# Patient Record
Sex: FEMALE | Race: WHITE | Employment: FULL TIME | URBAN - METROPOLITAN AREA
[De-identification: names, ages, dates, MRNs, and addresses within clinical notes are randomized per-mention and may not be internally consistent; named-entity substitution may affect disease eponyms.]

---

## 2017-04-24 ENCOUNTER — APPOINTMENT (OUTPATIENT)
Dept: GENERAL RADIOLOGY | Age: 37
End: 2017-04-24
Payer: COMMERCIAL

## 2017-04-24 ENCOUNTER — HOSPITAL ENCOUNTER (EMERGENCY)
Age: 37
Discharge: HOME | End: 2017-04-24
Attending: EMERGENCY MEDICINE
Payer: COMMERCIAL

## 2017-04-24 VITALS
OXYGEN SATURATION: 99 % | BODY MASS INDEX: 43.32 KG/M2 | RESPIRATION RATE: 18 BRPM | WEIGHT: 260 LBS | HEIGHT: 65 IN | TEMPERATURE: 98.1 F | SYSTOLIC BLOOD PRESSURE: 115 MMHG | HEART RATE: 82 BPM | DIASTOLIC BLOOD PRESSURE: 70 MMHG

## 2017-04-24 DIAGNOSIS — S93.402A SPRAIN OF LEFT ANKLE, UNSPECIFIED LIGAMENT, INITIAL ENCOUNTER: Primary | ICD-10-CM

## 2017-04-24 PROCEDURE — 99283 EMERGENCY DEPT VISIT LOW MDM: CPT

## 2017-04-24 PROCEDURE — 73610 X-RAY EXAM OF ANKLE: CPT

## 2017-04-24 RX ORDER — IBUPROFEN 800 MG/1
800 TABLET ORAL EVERY 8 HOURS PRN
Qty: 30 TABLET | Refills: 0 | Status: SHIPPED | OUTPATIENT
Start: 2017-04-24 | End: 2018-08-06 | Stop reason: ALTCHOICE

## 2017-04-24 ASSESSMENT — PAIN SCALES - GENERAL: PAINLEVEL_OUTOF10: 8

## 2018-04-16 ENCOUNTER — HOSPITAL ENCOUNTER (EMERGENCY)
Age: 38
Discharge: HOME | End: 2018-04-16
Attending: EMERGENCY MEDICINE

## 2018-04-16 VITALS
RESPIRATION RATE: 16 BRPM | OXYGEN SATURATION: 98 % | HEIGHT: 65 IN | BODY MASS INDEX: 41.65 KG/M2 | HEART RATE: 79 BPM | DIASTOLIC BLOOD PRESSURE: 76 MMHG | SYSTOLIC BLOOD PRESSURE: 124 MMHG | WEIGHT: 250 LBS | TEMPERATURE: 98.2 F

## 2018-04-16 DIAGNOSIS — B37.2 CANDIDAL INTERTRIGO: Primary | ICD-10-CM

## 2018-04-16 LAB — GLUCOSE BLD-MCNC: 100 MG/DL (ref 65–105)

## 2018-04-16 PROCEDURE — 99283 EMERGENCY DEPT VISIT LOW MDM: CPT

## 2018-04-16 PROCEDURE — 82947 ASSAY GLUCOSE BLOOD QUANT: CPT

## 2018-04-16 PROCEDURE — 6360000002 HC RX W HCPCS: Performed by: EMERGENCY MEDICINE

## 2018-04-16 PROCEDURE — 2500000003 HC RX 250 WO HCPCS: Performed by: EMERGENCY MEDICINE

## 2018-04-16 RX ORDER — DEXAMETHASONE SODIUM PHOSPHATE 4 MG/ML
10 INJECTION, SOLUTION INTRA-ARTICULAR; INTRALESIONAL; INTRAMUSCULAR; INTRAVENOUS; SOFT TISSUE ONCE
Status: COMPLETED | OUTPATIENT
Start: 2018-04-16 | End: 2018-04-16

## 2018-04-16 RX ORDER — NYSTATIN 100000 [USP'U]/G
POWDER TOPICAL
Qty: 1 BOTTLE | Refills: 0 | Status: SHIPPED | OUTPATIENT
Start: 2018-04-16 | End: 2019-04-23

## 2018-04-16 RX ADMIN — DEXAMETHASONE SODIUM PHOSPHATE 10 MG: 4 INJECTION, SOLUTION INTRAMUSCULAR; INTRAVENOUS at 01:29

## 2018-04-16 RX ADMIN — MICONAZOLE NITRATE: 2 POWDER TOPICAL at 01:55

## 2018-05-12 ENCOUNTER — HOSPITAL ENCOUNTER (EMERGENCY)
Age: 38
Discharge: HOME | End: 2018-05-12
Attending: EMERGENCY MEDICINE

## 2018-05-12 VITALS
TEMPERATURE: 98.3 F | DIASTOLIC BLOOD PRESSURE: 75 MMHG | HEART RATE: 60 BPM | HEIGHT: 65 IN | WEIGHT: 250 LBS | RESPIRATION RATE: 16 BRPM | SYSTOLIC BLOOD PRESSURE: 118 MMHG | BODY MASS INDEX: 41.65 KG/M2 | OXYGEN SATURATION: 97 %

## 2018-05-12 DIAGNOSIS — R11.2 NAUSEA VOMITING AND DIARRHEA: Primary | ICD-10-CM

## 2018-05-12 DIAGNOSIS — R19.7 NAUSEA VOMITING AND DIARRHEA: Primary | ICD-10-CM

## 2018-05-12 LAB
ABSOLUTE EOS #: 0.2 K/UL (ref 0–0.4)
ABSOLUTE IMMATURE GRANULOCYTE: ABNORMAL K/UL (ref 0–0.3)
ABSOLUTE LYMPH #: 1 K/UL (ref 1–4.8)
ABSOLUTE MONO #: 0.5 K/UL (ref 0.1–1.3)
ALBUMIN SERPL-MCNC: 3.9 G/DL (ref 3.5–5.2)
ALBUMIN/GLOBULIN RATIO: ABNORMAL (ref 1–2.5)
ALP BLD-CCNC: 111 U/L (ref 35–104)
ALT SERPL-CCNC: 25 U/L (ref 5–33)
ANION GAP SERPL CALCULATED.3IONS-SCNC: 14 MMOL/L (ref 9–17)
AST SERPL-CCNC: 30 U/L
BASOPHILS # BLD: 1 % (ref 0–2)
BASOPHILS ABSOLUTE: 0 K/UL (ref 0–0.2)
BILIRUB SERPL-MCNC: 0.41 MG/DL (ref 0.3–1.2)
BUN BLDV-MCNC: 8 MG/DL (ref 6–20)
BUN/CREAT BLD: ABNORMAL (ref 9–20)
CALCIUM SERPL-MCNC: 8.6 MG/DL (ref 8.6–10.4)
CHLORIDE BLD-SCNC: 101 MMOL/L (ref 98–107)
CO2: 22 MMOL/L (ref 20–31)
CREAT SERPL-MCNC: 0.75 MG/DL (ref 0.5–0.9)
DIFFERENTIAL TYPE: ABNORMAL
EOSINOPHILS RELATIVE PERCENT: 3 % (ref 0–4)
GFR AFRICAN AMERICAN: >60 ML/MIN
GFR NON-AFRICAN AMERICAN: >60 ML/MIN
GFR SERPL CREATININE-BSD FRML MDRD: ABNORMAL ML/MIN/{1.73_M2}
GFR SERPL CREATININE-BSD FRML MDRD: ABNORMAL ML/MIN/{1.73_M2}
GLUCOSE BLD-MCNC: 87 MG/DL (ref 70–99)
HCT VFR BLD CALC: 43.8 % (ref 36–46)
HEMOGLOBIN: 15.1 G/DL (ref 12–16)
IMMATURE GRANULOCYTES: ABNORMAL %
LIPASE: 15 U/L (ref 13–60)
LYMPHOCYTES # BLD: 16 % (ref 24–44)
MCH RBC QN AUTO: 29.6 PG (ref 26–34)
MCHC RBC AUTO-ENTMCNC: 34.5 G/DL (ref 31–37)
MCV RBC AUTO: 85.8 FL (ref 80–100)
MONOCYTES # BLD: 9 % (ref 1–7)
NRBC AUTOMATED: ABNORMAL PER 100 WBC
PDW BLD-RTO: 13.5 % (ref 11.5–14.9)
PLATELET # BLD: 269 K/UL (ref 150–450)
PLATELET ESTIMATE: ABNORMAL
PMV BLD AUTO: 8 FL (ref 6–12)
POTASSIUM SERPL-SCNC: 4.6 MMOL/L (ref 3.7–5.3)
RBC # BLD: 5.1 M/UL (ref 4–5.2)
RBC # BLD: ABNORMAL 10*6/UL
SEG NEUTROPHILS: 71 % (ref 36–66)
SEGMENTED NEUTROPHILS ABSOLUTE COUNT: 4.5 K/UL (ref 1.3–9.1)
SODIUM BLD-SCNC: 137 MMOL/L (ref 135–144)
TOTAL PROTEIN: 7.4 G/DL (ref 6.4–8.3)
WBC # BLD: 6.3 K/UL (ref 3.5–11)
WBC # BLD: ABNORMAL 10*3/UL

## 2018-05-12 PROCEDURE — 80053 COMPREHEN METABOLIC PANEL: CPT

## 2018-05-12 PROCEDURE — 96374 THER/PROPH/DIAG INJ IV PUSH: CPT

## 2018-05-12 PROCEDURE — 6360000002 HC RX W HCPCS: Performed by: EMERGENCY MEDICINE

## 2018-05-12 PROCEDURE — 2580000003 HC RX 258: Performed by: EMERGENCY MEDICINE

## 2018-05-12 PROCEDURE — 99284 EMERGENCY DEPT VISIT MOD MDM: CPT

## 2018-05-12 PROCEDURE — 85025 COMPLETE CBC W/AUTO DIFF WBC: CPT

## 2018-05-12 PROCEDURE — 83690 ASSAY OF LIPASE: CPT

## 2018-05-12 PROCEDURE — 96375 TX/PRO/DX INJ NEW DRUG ADDON: CPT

## 2018-05-12 PROCEDURE — 36415 COLL VENOUS BLD VENIPUNCTURE: CPT

## 2018-05-12 RX ORDER — 0.9 % SODIUM CHLORIDE 0.9 %
1000 INTRAVENOUS SOLUTION INTRAVENOUS ONCE
Status: COMPLETED | OUTPATIENT
Start: 2018-05-12 | End: 2018-05-12

## 2018-05-12 RX ORDER — ONDANSETRON 4 MG/1
4 TABLET, ORALLY DISINTEGRATING ORAL ONCE
Status: COMPLETED | OUTPATIENT
Start: 2018-05-12 | End: 2018-05-12

## 2018-05-12 RX ORDER — KETOROLAC TROMETHAMINE 30 MG/ML
30 INJECTION, SOLUTION INTRAMUSCULAR; INTRAVENOUS ONCE
Status: COMPLETED | OUTPATIENT
Start: 2018-05-12 | End: 2018-05-12

## 2018-05-12 RX ORDER — ONDANSETRON 2 MG/ML
4 INJECTION INTRAMUSCULAR; INTRAVENOUS ONCE
Status: COMPLETED | OUTPATIENT
Start: 2018-05-12 | End: 2018-05-12

## 2018-05-12 RX ORDER — ONDANSETRON 4 MG/1
4 TABLET, ORALLY DISINTEGRATING ORAL EVERY 8 HOURS PRN
Qty: 10 TABLET | Refills: 0 | Status: SHIPPED | OUTPATIENT
Start: 2018-05-12 | End: 2019-04-23

## 2018-05-12 RX ADMIN — KETOROLAC TROMETHAMINE 30 MG: 30 INJECTION, SOLUTION INTRAMUSCULAR; INTRAVENOUS at 17:56

## 2018-05-12 RX ADMIN — ONDANSETRON 4 MG: 4 TABLET, ORALLY DISINTEGRATING ORAL at 17:39

## 2018-05-12 RX ADMIN — SODIUM CHLORIDE 1000 ML: 9 INJECTION, SOLUTION INTRAVENOUS at 17:49

## 2018-05-12 RX ADMIN — ONDANSETRON 4 MG: 2 INJECTION INTRAMUSCULAR; INTRAVENOUS at 17:56

## 2018-05-12 ASSESSMENT — PAIN DESCRIPTION - ORIENTATION: ORIENTATION: MID;UPPER

## 2018-05-12 ASSESSMENT — PAIN SCALES - GENERAL
PAINLEVEL_OUTOF10: 8
PAINLEVEL_OUTOF10: 7
PAINLEVEL_OUTOF10: 8

## 2018-05-12 ASSESSMENT — ENCOUNTER SYMPTOMS
BACK PAIN: 0
DIARRHEA: 1
SHORTNESS OF BREATH: 0
VOMITING: 1
ABDOMINAL PAIN: 1
RESPIRATORY NEGATIVE: 1
EYES NEGATIVE: 1
NAUSEA: 1
COUGH: 0

## 2018-05-12 ASSESSMENT — PAIN DESCRIPTION - PAIN TYPE: TYPE: ACUTE PAIN

## 2018-05-12 ASSESSMENT — PAIN DESCRIPTION - LOCATION: LOCATION: ABDOMEN

## 2018-08-06 ENCOUNTER — HOSPITAL ENCOUNTER (EMERGENCY)
Age: 38
Discharge: HOME | End: 2018-08-06
Attending: EMERGENCY MEDICINE

## 2018-08-06 VITALS
HEART RATE: 84 BPM | RESPIRATION RATE: 17 BRPM | TEMPERATURE: 98.4 F | DIASTOLIC BLOOD PRESSURE: 72 MMHG | OXYGEN SATURATION: 100 % | WEIGHT: 250 LBS | HEIGHT: 65 IN | SYSTOLIC BLOOD PRESSURE: 114 MMHG | BODY MASS INDEX: 41.65 KG/M2

## 2018-08-06 DIAGNOSIS — M25.561 ACUTE PAIN OF RIGHT KNEE: Primary | ICD-10-CM

## 2018-08-06 PROCEDURE — 6370000000 HC RX 637 (ALT 250 FOR IP): Performed by: PHYSICIAN ASSISTANT

## 2018-08-06 PROCEDURE — 99284 EMERGENCY DEPT VISIT MOD MDM: CPT

## 2018-08-06 PROCEDURE — 93971 EXTREMITY STUDY: CPT

## 2018-08-06 RX ORDER — IBUPROFEN 800 MG/1
800 TABLET ORAL ONCE
Status: COMPLETED | OUTPATIENT
Start: 2018-08-06 | End: 2018-08-06

## 2018-08-06 RX ORDER — IBUPROFEN 800 MG/1
800 TABLET ORAL EVERY 8 HOURS PRN
Qty: 20 TABLET | Refills: 0 | Status: SHIPPED | OUTPATIENT
Start: 2018-08-06 | End: 2019-04-23

## 2018-08-06 RX ADMIN — IBUPROFEN 800 MG: 800 TABLET ORAL at 22:53

## 2018-08-06 ASSESSMENT — PAIN SCALES - GENERAL: PAINLEVEL_OUTOF10: 8

## 2018-08-07 NOTE — ED PROVIDER NOTES
[address]  Emergency Department  Independent Attestation     Pt Name: Santi Rehman  MRN: [id number]  [place] 1980  Date of evaluation: 8/6/18       Santi Rehman is a 40 y.o. female who presents with Knee Pain      I was personally available for consultation in the Emergency Department.     Camden Wade DO  Attending Emergency Physician  [address]      (Please note that portions of this note were completed with a voice recognition program.  Efforts were made to edit the dictations but occasionally words are mis-transcribed.)        Camden Wade DO  08/06/18 2030
DISINTEGRATING TABLET    Take 1 tablet by mouth every 8 hours as needed for Nausea or Vomiting    PROPRANOLOL (INDERAL LA) 60 MG CR CAPSULE    Take 60 mg by mouth daily    TOPIRAMATE (TOPAMAX) 25 MG TABLET      Take 50 mg by mouth daily        ALLERGIES     Patient has no known allergies. FAMILY HISTORY     History reviewed. No pertinent family history. No family status information on file. None otherwise stated in nurses note    SOCIAL HISTORY      reports that she has never smoked. She has never used smokeless tobacco. She reports that she does not drink alcohol or use drugs. Lives at home with others    PHYSICAL EXAM    (up to 7 for level 4, 8 or more for level 5)     ED Triage Vitals [08/06/18 2040]   BP Temp Temp Source Pulse Resp SpO2 Height Weight   114/72 98.4 °F (36.9 °C) Oral 84 17 100 % 5' 5\" (1.651 m) 250 lb (113.4 kg)       Physical Exam   Nursing note and vitals reviewed. Constitutional: Oriented to person, place, and time and well-developed, well-nourished, and in no distress. Head: Normocephalic and atraumatic. Ear: External ears normal.   Nose: Nose normal and midline. Eyes: Conjunctivae and EOM are normal. Pupils are equal, round, and reactive to light. Cardiovascular: Normal rate, regular rhythm, normal heart sounds and intact distal pulses. Pulmonary/Chest: Effort normal and breath sounds normal. No respiratory distress. No wheezes. No rales. No chest tenderness. Musculoskeletal: TTP right knee, Normal range of motion, no swelling or warmth or rashes. There is some mild tenderness to palpation to the lateral right knee and the right fibula area. Negative Homans sign. Neurological: Alert and oriented to person, place, and time. GCS score is 15. Skin: Skin is warm and dry. No rash noted. No erythema. No pallor.              DIAGNOSTIC RESULTS     RADIOLOGY:   All plain film, CT, MRI, and formal ultrasound images (except ED bedside ultrasound) are read by the radiologist

## 2018-12-27 ENCOUNTER — HOSPITAL ENCOUNTER (EMERGENCY)
Age: 38
Discharge: HOME | End: 2018-12-28
Attending: EMERGENCY MEDICINE
Payer: COMMERCIAL

## 2018-12-27 ENCOUNTER — APPOINTMENT (OUTPATIENT)
Dept: GENERAL RADIOLOGY | Age: 38
End: 2018-12-27
Payer: COMMERCIAL

## 2018-12-27 VITALS
HEART RATE: 67 BPM | HEIGHT: 65 IN | TEMPERATURE: 97.5 F | OXYGEN SATURATION: 95 % | RESPIRATION RATE: 16 BRPM | WEIGHT: 250 LBS | DIASTOLIC BLOOD PRESSURE: 68 MMHG | SYSTOLIC BLOOD PRESSURE: 112 MMHG | BODY MASS INDEX: 41.65 KG/M2

## 2018-12-27 DIAGNOSIS — J40 BRONCHITIS: Primary | ICD-10-CM

## 2018-12-27 LAB
ABSOLUTE EOS #: 0 K/UL (ref 0–0.4)
ABSOLUTE IMMATURE GRANULOCYTE: ABNORMAL K/UL (ref 0–0.3)
ABSOLUTE LYMPH #: 0.7 K/UL (ref 1–4.8)
ABSOLUTE MONO #: 0.1 K/UL (ref 0.1–1.3)
ALBUMIN SERPL-MCNC: 4 G/DL (ref 3.5–5.2)
ALBUMIN/GLOBULIN RATIO: ABNORMAL (ref 1–2.5)
ALP BLD-CCNC: 100 U/L (ref 35–104)
ALT SERPL-CCNC: 21 U/L (ref 5–33)
ANION GAP SERPL CALCULATED.3IONS-SCNC: 14 MMOL/L (ref 9–17)
AST SERPL-CCNC: 19 U/L
BASOPHILS # BLD: 0 % (ref 0–2)
BASOPHILS ABSOLUTE: 0 K/UL (ref 0–0.2)
BILIRUB SERPL-MCNC: 0.2 MG/DL (ref 0.3–1.2)
BNP INTERPRETATION: NORMAL
BUN BLDV-MCNC: 9 MG/DL (ref 6–20)
BUN/CREAT BLD: ABNORMAL (ref 9–20)
CALCIUM SERPL-MCNC: 9.4 MG/DL (ref 8.6–10.4)
CHLORIDE BLD-SCNC: 102 MMOL/L (ref 98–107)
CO2: 22 MMOL/L (ref 20–31)
CREAT SERPL-MCNC: 0.76 MG/DL (ref 0.5–0.9)
D-DIMER QUANTITATIVE: 0.37 MG/L FEU
DIFFERENTIAL TYPE: ABNORMAL
DIRECT EXAM: NORMAL
EKG ATRIAL RATE: 79 BPM
EKG P AXIS: 30 DEGREES
EKG P-R INTERVAL: 166 MS
EKG Q-T INTERVAL: 386 MS
EKG QRS DURATION: 92 MS
EKG QTC CALCULATION (BAZETT): 442 MS
EKG R AXIS: 36 DEGREES
EKG T AXIS: 35 DEGREES
EKG VENTRICULAR RATE: 79 BPM
EOSINOPHILS RELATIVE PERCENT: 0 % (ref 0–4)
GFR AFRICAN AMERICAN: >60 ML/MIN
GFR NON-AFRICAN AMERICAN: >60 ML/MIN
GFR SERPL CREATININE-BSD FRML MDRD: ABNORMAL ML/MIN/{1.73_M2}
GFR SERPL CREATININE-BSD FRML MDRD: ABNORMAL ML/MIN/{1.73_M2}
GLUCOSE BLD-MCNC: 204 MG/DL (ref 70–99)
HCT VFR BLD CALC: 43.1 % (ref 36–46)
HEMOGLOBIN: 14.3 G/DL (ref 12–16)
IMMATURE GRANULOCYTES: ABNORMAL %
LYMPHOCYTES # BLD: 7 % (ref 24–44)
Lab: NORMAL
MCH RBC QN AUTO: 28 PG (ref 26–34)
MCHC RBC AUTO-ENTMCNC: 33.1 G/DL (ref 31–37)
MCV RBC AUTO: 84.6 FL (ref 80–100)
MONOCYTES # BLD: 1 % (ref 1–7)
NRBC AUTOMATED: ABNORMAL PER 100 WBC
PDW BLD-RTO: 13.4 % (ref 11.5–14.9)
PLATELET # BLD: 309 K/UL (ref 150–450)
PLATELET ESTIMATE: ABNORMAL
PMV BLD AUTO: 8.1 FL (ref 6–12)
POTASSIUM SERPL-SCNC: 4.1 MMOL/L (ref 3.7–5.3)
PRO-BNP: 177 PG/ML
RBC # BLD: 5.1 M/UL (ref 4–5.2)
RBC # BLD: ABNORMAL 10*6/UL
SEG NEUTROPHILS: 92 % (ref 36–66)
SEGMENTED NEUTROPHILS ABSOLUTE COUNT: 9.7 K/UL (ref 1.3–9.1)
SODIUM BLD-SCNC: 138 MMOL/L (ref 135–144)
SPECIMEN DESCRIPTION: NORMAL
STATUS: NORMAL
TOTAL PROTEIN: 7.6 G/DL (ref 6.4–8.3)
TROPONIN INTERP: NORMAL
TROPONIN T: NORMAL NG/ML
TROPONIN, HIGH SENSITIVITY: <6 NG/L (ref 0–14)
WBC # BLD: 10.6 K/UL (ref 3.5–11)
WBC # BLD: ABNORMAL 10*3/UL

## 2018-12-27 PROCEDURE — 87804 INFLUENZA ASSAY W/OPTIC: CPT

## 2018-12-27 PROCEDURE — 93005 ELECTROCARDIOGRAM TRACING: CPT

## 2018-12-27 PROCEDURE — 85379 FIBRIN DEGRADATION QUANT: CPT

## 2018-12-27 PROCEDURE — 2580000003 HC RX 258: Performed by: EMERGENCY MEDICINE

## 2018-12-27 PROCEDURE — 71046 X-RAY EXAM CHEST 2 VIEWS: CPT

## 2018-12-27 PROCEDURE — 99284 EMERGENCY DEPT VISIT MOD MDM: CPT

## 2018-12-27 PROCEDURE — 83880 ASSAY OF NATRIURETIC PEPTIDE: CPT

## 2018-12-27 PROCEDURE — 84484 ASSAY OF TROPONIN QUANT: CPT

## 2018-12-27 PROCEDURE — 36415 COLL VENOUS BLD VENIPUNCTURE: CPT

## 2018-12-27 PROCEDURE — 85025 COMPLETE CBC W/AUTO DIFF WBC: CPT

## 2018-12-27 PROCEDURE — 80053 COMPREHEN METABOLIC PANEL: CPT

## 2018-12-27 RX ORDER — BENZONATATE 100 MG/1
100 CAPSULE ORAL 3 TIMES DAILY PRN
Qty: 30 CAPSULE | Refills: 0 | Status: SHIPPED | OUTPATIENT
Start: 2018-12-27 | End: 2019-01-03

## 2018-12-27 RX ORDER — ALBUTEROL SULFATE 1.25 MG/3ML
1 SOLUTION RESPIRATORY (INHALATION) EVERY 6 HOURS PRN
COMMUNITY
End: 2019-12-26

## 2018-12-27 RX ORDER — PREDNISONE 20 MG/1
20 TABLET ORAL 2 TIMES DAILY
COMMUNITY
End: 2019-04-23

## 2018-12-27 RX ORDER — CODEINE PHOSPHATE AND GUAIFENESIN 10; 100 MG/5ML; MG/5ML
10 SOLUTION ORAL ONCE
Status: COMPLETED | OUTPATIENT
Start: 2018-12-28 | End: 2018-12-28

## 2018-12-27 RX ORDER — 0.9 % SODIUM CHLORIDE 0.9 %
1000 INTRAVENOUS SOLUTION INTRAVENOUS ONCE
Status: COMPLETED | OUTPATIENT
Start: 2018-12-27 | End: 2018-12-27

## 2018-12-27 RX ADMIN — SODIUM CHLORIDE 1000 ML: 9 INJECTION, SOLUTION INTRAVENOUS at 21:48

## 2018-12-27 ASSESSMENT — ENCOUNTER SYMPTOMS
DIARRHEA: 0
EYE REDNESS: 0
ABDOMINAL PAIN: 0
NAUSEA: 0
EYE PAIN: 0
VOMITING: 0
SHORTNESS OF BREATH: 1
BACK PAIN: 0
SORE THROAT: 0
RHINORRHEA: 0
COUGH: 1

## 2018-12-27 ASSESSMENT — PAIN DESCRIPTION - LOCATION: LOCATION: CHEST

## 2018-12-27 ASSESSMENT — PAIN SCALES - GENERAL: PAINLEVEL_OUTOF10: 7

## 2018-12-27 ASSESSMENT — PAIN DESCRIPTION - FREQUENCY: FREQUENCY: CONTINUOUS

## 2018-12-27 ASSESSMENT — PAIN DESCRIPTION - DESCRIPTORS: DESCRIPTORS: ACHING

## 2018-12-28 PROCEDURE — 6370000000 HC RX 637 (ALT 250 FOR IP): Performed by: EMERGENCY MEDICINE

## 2018-12-28 RX ADMIN — GUAIFENESIN AND CODEINE PHOSPHATE 10 ML: 100; 10 SOLUTION ORAL at 00:03

## 2018-12-28 NOTE — ED PROVIDER NOTES
Smoker    Smokeless tobacco: Never Used    Alcohol use No     PHYSICAL EXAM     INITIAL VITALS: /68   Pulse 67   Temp 97.5 °F (36.4 °C) (Oral)   Resp 16   Ht 5' 5\" (1.651 m)   Wt 250 lb (113.4 kg)   SpO2 95%   BMI 41.60 kg/m²    Physical Exam   Constitutional: She is oriented to person, place, and time. She appears well-developed and well-nourished. No distress. HENT:   Head: Normocephalic and atraumatic. Nose: Nose normal.   Mouth/Throat: Oropharynx is clear and moist.   Eyes: Pupils are equal, round, and reactive to light. Conjunctivae and EOM are normal.   Cardiovascular: Normal rate, regular rhythm and normal heart sounds. Exam reveals no gallop and no friction rub. No murmur heard. Pulmonary/Chest: Effort normal and breath sounds normal. No respiratory distress. She has no wheezes. She has no rales. She exhibits no tenderness. Abdominal: Soft. Bowel sounds are normal. She exhibits no distension. There is no tenderness. Musculoskeletal: Normal range of motion. She exhibits no edema or tenderness. Neurological: She is alert and oriented to person, place, and time. She exhibits normal muscle tone. Coordination normal.   Skin: Skin is warm and dry. No rash noted. She is not diaphoretic. Nursing note and vitals reviewed. MEDICAL DECISION MAKIN y.o. female presenting with exertional shortness of breath and cough. DDx includes, but is not limited to: PE, CHF, influenza, pneumonia, bronchitis. Will get cardiac workup, d-dimer, flu swab. Will hydrate and re-evaluate. Labs show no CHF, d-dimer negative, trop/bnp negative. No pneumonia on CXR. Flu negative. Will treat for bronchitis, follow up with PCP. Return precautions given verbally and in discharge paperwork, all questions answered. Patient agrees with plan of care.       CRITICAL CARE:       PROCEDURES:    Procedures  EKG Interpretation    Interpreted by me    Rhythm: normal sinus   Rate: 79  Axis: normal  Ectopy:

## 2018-12-31 ENCOUNTER — HOSPITAL ENCOUNTER (EMERGENCY)
Age: 38
Discharge: HOME | End: 2018-12-31
Attending: EMERGENCY MEDICINE
Payer: COMMERCIAL

## 2018-12-31 VITALS
SYSTOLIC BLOOD PRESSURE: 111 MMHG | HEIGHT: 65 IN | WEIGHT: 260 LBS | DIASTOLIC BLOOD PRESSURE: 75 MMHG | OXYGEN SATURATION: 97 % | TEMPERATURE: 97.9 F | RESPIRATION RATE: 18 BRPM | HEART RATE: 70 BPM | BODY MASS INDEX: 43.32 KG/M2

## 2018-12-31 DIAGNOSIS — J06.9 VIRAL UPPER RESPIRATORY INFECTION: Primary | ICD-10-CM

## 2018-12-31 DIAGNOSIS — R42 DIZZINESS: ICD-10-CM

## 2018-12-31 PROCEDURE — 99284 EMERGENCY DEPT VISIT MOD MDM: CPT

## 2018-12-31 PROCEDURE — 6360000002 HC RX W HCPCS: Performed by: STUDENT IN AN ORGANIZED HEALTH CARE EDUCATION/TRAINING PROGRAM

## 2018-12-31 PROCEDURE — 6370000000 HC RX 637 (ALT 250 FOR IP): Performed by: STUDENT IN AN ORGANIZED HEALTH CARE EDUCATION/TRAINING PROGRAM

## 2018-12-31 PROCEDURE — 96372 THER/PROPH/DIAG INJ SC/IM: CPT

## 2018-12-31 RX ORDER — MECLIZINE HYDROCHLORIDE 25 MG/1
25 TABLET ORAL 3 TIMES DAILY PRN
Qty: 9 TABLET | Refills: 0 | Status: SHIPPED | OUTPATIENT
Start: 2018-12-31 | End: 2019-01-10

## 2018-12-31 RX ORDER — MECLIZINE HYDROCHLORIDE 25 MG/1
25 TABLET ORAL ONCE
Status: COMPLETED | OUTPATIENT
Start: 2018-12-31 | End: 2018-12-31

## 2018-12-31 RX ORDER — KETOROLAC TROMETHAMINE 30 MG/ML
15 INJECTION, SOLUTION INTRAMUSCULAR; INTRAVENOUS ONCE
Status: COMPLETED | OUTPATIENT
Start: 2018-12-31 | End: 2018-12-31

## 2018-12-31 RX ADMIN — KETOROLAC TROMETHAMINE 15 MG: 30 INJECTION, SOLUTION INTRAMUSCULAR at 23:01

## 2018-12-31 RX ADMIN — MECLIZINE HYDROCHLORIDE 25 MG: 25 TABLET ORAL at 22:25

## 2018-12-31 RX ADMIN — PROCHLORPERAZINE EDISYLATE 10 MG: 5 INJECTION INTRAMUSCULAR; INTRAVENOUS at 23:02

## 2018-12-31 ASSESSMENT — PAIN DESCRIPTION - LOCATION
LOCATION: HEAD
LOCATION: HEAD

## 2018-12-31 ASSESSMENT — PAIN DESCRIPTION - DESCRIPTORS: DESCRIPTORS: ACHING

## 2018-12-31 ASSESSMENT — PAIN SCALES - GENERAL
PAINLEVEL_OUTOF10: 4
PAINLEVEL_OUTOF10: 8
PAINLEVEL_OUTOF10: 8

## 2018-12-31 ASSESSMENT — PAIN DESCRIPTION - PROGRESSION: CLINICAL_PROGRESSION: GRADUALLY IMPROVING

## 2019-01-21 ENCOUNTER — APPOINTMENT (OUTPATIENT)
Dept: GENERAL RADIOLOGY | Age: 39
End: 2019-01-21
Payer: COMMERCIAL

## 2019-01-21 ENCOUNTER — HOSPITAL ENCOUNTER (EMERGENCY)
Age: 39
Discharge: HOME | End: 2019-01-21
Attending: EMERGENCY MEDICINE
Payer: COMMERCIAL

## 2019-01-21 VITALS
OXYGEN SATURATION: 99 % | WEIGHT: 250 LBS | HEIGHT: 65 IN | DIASTOLIC BLOOD PRESSURE: 57 MMHG | HEART RATE: 94 BPM | TEMPERATURE: 98 F | SYSTOLIC BLOOD PRESSURE: 114 MMHG | RESPIRATION RATE: 18 BRPM | BODY MASS INDEX: 41.65 KG/M2

## 2019-01-21 DIAGNOSIS — J18.9 PNEUMONIA DUE TO ORGANISM: Primary | ICD-10-CM

## 2019-01-21 LAB
DIRECT EXAM: NORMAL
Lab: NORMAL
Lab: NORMAL
SPECIMEN DESCRIPTION: NORMAL
SPECIMEN DESCRIPTION: NORMAL
STATUS: NORMAL
STATUS: NORMAL

## 2019-01-21 PROCEDURE — 94664 DEMO&/EVAL PT USE INHALER: CPT

## 2019-01-21 PROCEDURE — 71046 X-RAY EXAM CHEST 2 VIEWS: CPT

## 2019-01-21 PROCEDURE — 87807 RSV ASSAY W/OPTIC: CPT

## 2019-01-21 PROCEDURE — 94640 AIRWAY INHALATION TREATMENT: CPT

## 2019-01-21 PROCEDURE — 87804 INFLUENZA ASSAY W/OPTIC: CPT

## 2019-01-21 PROCEDURE — 99284 EMERGENCY DEPT VISIT MOD MDM: CPT

## 2019-01-21 PROCEDURE — 6370000000 HC RX 637 (ALT 250 FOR IP): Performed by: EMERGENCY MEDICINE

## 2019-01-21 RX ORDER — IPRATROPIUM BROMIDE AND ALBUTEROL SULFATE 2.5; .5 MG/3ML; MG/3ML
1 SOLUTION RESPIRATORY (INHALATION) ONCE
Status: COMPLETED | OUTPATIENT
Start: 2019-01-21 | End: 2019-01-21

## 2019-01-21 RX ORDER — AZITHROMYCIN 250 MG/1
TABLET, FILM COATED ORAL
Qty: 1 PACKET | Refills: 0 | Status: SHIPPED | OUTPATIENT
Start: 2019-01-21 | End: 2019-01-25 | Stop reason: ALTCHOICE

## 2019-01-21 RX ORDER — BENZONATATE 100 MG/1
100 CAPSULE ORAL 3 TIMES DAILY PRN
Qty: 21 CAPSULE | Refills: 0 | Status: SHIPPED | OUTPATIENT
Start: 2019-01-21 | End: 2019-01-28

## 2019-01-21 RX ADMIN — IPRATROPIUM BROMIDE AND ALBUTEROL SULFATE 1 AMPULE: .5; 3 SOLUTION RESPIRATORY (INHALATION) at 19:17

## 2019-01-21 ASSESSMENT — PAIN SCALES - GENERAL: PAINLEVEL_OUTOF10: 7

## 2019-01-21 ASSESSMENT — PAIN DESCRIPTION - PAIN TYPE: TYPE: ACUTE PAIN

## 2019-01-21 ASSESSMENT — PAIN DESCRIPTION - LOCATION: LOCATION: CHEST;THROAT

## 2019-01-25 ENCOUNTER — OFFICE VISIT (OUTPATIENT)
Dept: FAMILY MEDICINE CLINIC | Age: 39
End: 2019-01-25
Payer: COMMERCIAL

## 2019-01-25 VITALS
TEMPERATURE: 97.6 F | BODY MASS INDEX: 44.98 KG/M2 | HEIGHT: 65 IN | SYSTOLIC BLOOD PRESSURE: 110 MMHG | RESPIRATION RATE: 16 BRPM | HEART RATE: 60 BPM | DIASTOLIC BLOOD PRESSURE: 82 MMHG | WEIGHT: 270 LBS

## 2019-01-25 DIAGNOSIS — R06.02 SOB (SHORTNESS OF BREATH): ICD-10-CM

## 2019-01-25 DIAGNOSIS — J18.9 PNEUMONIA OF RIGHT MIDDLE LOBE DUE TO INFECTIOUS ORGANISM: Primary | ICD-10-CM

## 2019-01-25 DIAGNOSIS — J18.9 PNEUMONIA OF RIGHT MIDDLE LOBE DUE TO INFECTIOUS ORGANISM: ICD-10-CM

## 2019-01-25 DIAGNOSIS — R05.3 PERSISTENT COUGH: ICD-10-CM

## 2019-01-25 PROCEDURE — G8427 DOCREV CUR MEDS BY ELIG CLIN: HCPCS | Performed by: NURSE PRACTITIONER

## 2019-01-25 PROCEDURE — 1036F TOBACCO NON-USER: CPT | Performed by: NURSE PRACTITIONER

## 2019-01-25 PROCEDURE — G8417 CALC BMI ABV UP PARAM F/U: HCPCS | Performed by: NURSE PRACTITIONER

## 2019-01-25 PROCEDURE — 99204 OFFICE O/P NEW MOD 45 MIN: CPT | Performed by: NURSE PRACTITIONER

## 2019-01-25 PROCEDURE — G8484 FLU IMMUNIZE NO ADMIN: HCPCS | Performed by: NURSE PRACTITIONER

## 2019-01-25 RX ORDER — ALBUTEROL SULFATE 2.5 MG/3ML
2.5 SOLUTION RESPIRATORY (INHALATION) EVERY 6 HOURS PRN
Qty: 120 EACH | Refills: 0 | Status: SHIPPED | OUTPATIENT
Start: 2019-01-25 | End: 2019-12-26

## 2019-01-25 RX ORDER — ALBUTEROL SULFATE 90 UG/1
2 AEROSOL, METERED RESPIRATORY (INHALATION) EVERY 6 HOURS PRN
Qty: 1 INHALER | Refills: 1 | Status: SHIPPED | OUTPATIENT
Start: 2019-01-25 | End: 2019-01-25 | Stop reason: SDUPTHER

## 2019-01-25 RX ORDER — ALBUTEROL SULFATE 2.5 MG/3ML
2.5 SOLUTION RESPIRATORY (INHALATION) EVERY 6 HOURS PRN
Qty: 120 EACH | Refills: 0 | Status: SHIPPED | OUTPATIENT
Start: 2019-01-25 | End: 2019-01-25 | Stop reason: SDUPTHER

## 2019-01-25 RX ORDER — ALBUTEROL SULFATE 90 UG/1
2 AEROSOL, METERED RESPIRATORY (INHALATION) EVERY 6 HOURS PRN
Qty: 1 INHALER | Refills: 1 | Status: SHIPPED | OUTPATIENT
Start: 2019-01-25 | End: 2019-04-23 | Stop reason: SDUPTHER

## 2019-01-25 RX ORDER — LEVOFLOXACIN 750 MG/1
750 TABLET ORAL DAILY
Qty: 7 TABLET | Refills: 0 | Status: SHIPPED | OUTPATIENT
Start: 2019-01-25 | End: 2019-01-25 | Stop reason: SDUPTHER

## 2019-01-25 RX ORDER — LEVOFLOXACIN 750 MG/1
750 TABLET ORAL DAILY
Qty: 7 TABLET | Refills: 0 | Status: SHIPPED | OUTPATIENT
Start: 2019-01-25 | End: 2019-02-01

## 2019-01-25 ASSESSMENT — ENCOUNTER SYMPTOMS
ABDOMINAL PAIN: 0
SINUS PRESSURE: 0
RHINORRHEA: 1
NAUSEA: 0
SORE THROAT: 0
BACK PAIN: 0
COUGH: 1
SHORTNESS OF BREATH: 1

## 2019-01-25 ASSESSMENT — PATIENT HEALTH QUESTIONNAIRE - PHQ9
SUM OF ALL RESPONSES TO PHQ9 QUESTIONS 1 & 2: 0
SUM OF ALL RESPONSES TO PHQ QUESTIONS 1-9: 0
2. FEELING DOWN, DEPRESSED OR HOPELESS: 0
SUM OF ALL RESPONSES TO PHQ QUESTIONS 1-9: 0
1. LITTLE INTEREST OR PLEASURE IN DOING THINGS: 0

## 2019-04-23 ENCOUNTER — HOSPITAL ENCOUNTER (OUTPATIENT)
Age: 39
Discharge: HOME | End: 2019-04-25
Payer: COMMERCIAL

## 2019-04-23 ENCOUNTER — HOSPITAL ENCOUNTER (OUTPATIENT)
Dept: GENERAL RADIOLOGY | Age: 39
Discharge: HOME | End: 2019-04-25
Payer: COMMERCIAL

## 2019-04-23 ENCOUNTER — OFFICE VISIT (OUTPATIENT)
Dept: FAMILY MEDICINE CLINIC | Age: 39
End: 2019-04-23
Payer: COMMERCIAL

## 2019-04-23 VITALS
DIASTOLIC BLOOD PRESSURE: 88 MMHG | WEIGHT: 271 LBS | RESPIRATION RATE: 13 BRPM | TEMPERATURE: 98.6 F | BODY MASS INDEX: 45.1 KG/M2 | HEART RATE: 68 BPM | SYSTOLIC BLOOD PRESSURE: 120 MMHG

## 2019-04-23 DIAGNOSIS — J98.01 BRONCHOSPASM: ICD-10-CM

## 2019-04-23 DIAGNOSIS — J40 BRONCHITIS: Primary | ICD-10-CM

## 2019-04-23 PROCEDURE — G8417 CALC BMI ABV UP PARAM F/U: HCPCS | Performed by: FAMILY MEDICINE

## 2019-04-23 PROCEDURE — 99213 OFFICE O/P EST LOW 20 MIN: CPT | Performed by: FAMILY MEDICINE

## 2019-04-23 PROCEDURE — 1036F TOBACCO NON-USER: CPT | Performed by: FAMILY MEDICINE

## 2019-04-23 PROCEDURE — G8427 DOCREV CUR MEDS BY ELIG CLIN: HCPCS | Performed by: FAMILY MEDICINE

## 2019-04-23 PROCEDURE — 71046 X-RAY EXAM CHEST 2 VIEWS: CPT

## 2019-04-23 RX ORDER — ALBUTEROL SULFATE 90 UG/1
2 AEROSOL, METERED RESPIRATORY (INHALATION) EVERY 6 HOURS PRN
Qty: 1 INHALER | Refills: 1 | Status: SHIPPED | OUTPATIENT
Start: 2019-04-23 | End: 2019-12-26

## 2019-04-23 RX ORDER — BENZONATATE 100 MG/1
CAPSULE ORAL
Qty: 30 CAPSULE | Refills: 1 | Status: SHIPPED | OUTPATIENT
Start: 2019-04-23 | End: 2019-10-28 | Stop reason: ALTCHOICE

## 2019-04-23 RX ORDER — AZITHROMYCIN 250 MG/1
TABLET, FILM COATED ORAL
Qty: 6 TABLET | Refills: 0 | Status: SHIPPED | OUTPATIENT
Start: 2019-04-23 | End: 2019-10-28 | Stop reason: ALTCHOICE

## 2019-04-23 ASSESSMENT — ENCOUNTER SYMPTOMS
CHEST TIGHTNESS: 0
WHEEZING: 1
ABDOMINAL PAIN: 0
COUGH: 1

## 2019-04-23 NOTE — PROGRESS NOTES
Subjective:      Patient ID: Marva Choi is a 45 y.o. female. Visit Information    Have you changed or started any medications since your last visit including any over-the-counter medicines, vitamins, or herbal medicines? no   Are you having any side effects from any of your medications? -  no  Have you stopped taking any of your medications? Is so, why? -  no    Have you seen any other physician or provider since your last visit? No  Have you had any other diagnostic tests since your last visit? No  Have you been seen in the emergency room and/or had an admission to a hospital since we last saw you? No  Have you had your routine dental cleaning in the past 6 months? no    Have you activated your [employer] account? If not, what are your barriers? Yes     Patient Care Team:  Mary Haley MD as PCP - General (Family Medicine)    Medical History Review  Past Medical, Family, and Social History reviewed and does contribute to the patient presenting condition    Health Maintenance   Topic Date Due    Pneumococcal 0-64 years Vaccine (1 of 1 - PPSV23) 09/23/1986    Varicella Vaccine (1 of 2 - 13+ 2-dose series) 09/23/1993    HIV screen  09/23/1995    DTaP/Tdap/Td vaccine (1 - Tdap) 09/23/1999    Cervical cancer screen  09/23/2001    Flu vaccine (Season Ended) 09/01/2019     HPI  Patient is a 43-year-old obese white female who presents with a two-week history of cough productive of yellowish sputum with occasional wheezing  And chills. She denies any fever,  chest pain or abdominal pain. She states that she was treated for pneumonia in January of this year. .  Review of Systems   Constitutional: Positive for chills. Negative for fever. HENT: Positive for congestion. Respiratory: Positive for cough and wheezing. Negative for chest tightness. Cardiovascular: Negative for chest pain. Gastrointestinal: Negative for abdominal pain. Skin: Negative for rash.        Objective:   Physical Exam Constitutional: She is oriented to person, place, and time. She appears well-developed and well-nourished. No distress. HENT:   Head: Normocephalic and atraumatic. Right Ear: Tympanic membrane, external ear and ear canal normal.   Left Ear: Tympanic membrane, external ear and ear canal normal.   Nose: Nose normal.   Mouth/Throat: Oropharynx is clear and moist.   Eyes: Conjunctivae are normal. Right eye exhibits no discharge. Left eye exhibits no discharge. No scleral icterus. Neck: Neck supple. Cardiovascular: Normal rate, regular rhythm and normal heart sounds. Pulmonary/Chest: Effort normal. No respiratory distress. She has no wheezes (slightly prolonged expiratory phase). She has no rales. Abdominal: Soft. She exhibits no distension. There is no tenderness. Musculoskeletal: She exhibits no edema. Neurological: She is alert and oriented to person, place, and time. Skin: Skin is warm and dry. No rash noted. Psychiatric: She has a normal mood and affect. Her behavior is normal.   Nursing note and vitals reviewed. Assessment:       Diagnosis Orders   1. Bronchitis     2.  Bronchospasm  XR CHEST STANDARD (2 VW)           Plan:      Orders Placed This Encounter   Procedures    XR CHEST STANDARD (2 VW)     Standing Status:   Future     Number of Occurrences:   1     Standing Expiration Date:   4/22/2020     Order Specific Question:   Reason for exam:     Answer:   Cough     Orders Placed This Encounter   Medications    albuterol sulfate HFA (PROAIR HFA) 108 (90 Base) MCG/ACT inhaler     Sig: Inhale 2 puffs into the lungs every 6 hours as needed for Wheezing     Dispense:  1 Inhaler     Refill:  1    benzonatate (TESSALON PERLES) 100 MG capsule     Sig: Take 1-2 capsules 3 times daily as needed for cough     Dispense:  30 capsule     Refill:  1    azithromycin (ZITHROMAX) 250 MG tablet     Sig: Take 2 tablets on day 1 followed by 1 tablet daily thereafter     Dispense:  6 tablet     Refill: 0         Follow-up in 2-3 weeks as needed

## 2019-08-12 ENCOUNTER — HOSPITAL ENCOUNTER (EMERGENCY)
Age: 39
Discharge: HOME | End: 2019-08-12
Attending: EMERGENCY MEDICINE
Payer: COMMERCIAL

## 2019-08-12 VITALS
RESPIRATION RATE: 18 BRPM | HEIGHT: 65 IN | HEART RATE: 81 BPM | OXYGEN SATURATION: 98 % | BODY MASS INDEX: 43.32 KG/M2 | WEIGHT: 260 LBS | DIASTOLIC BLOOD PRESSURE: 69 MMHG | TEMPERATURE: 98 F | SYSTOLIC BLOOD PRESSURE: 120 MMHG

## 2019-08-12 DIAGNOSIS — G43.909 MIGRAINE WITHOUT STATUS MIGRAINOSUS, NOT INTRACTABLE, UNSPECIFIED MIGRAINE TYPE: Primary | ICD-10-CM

## 2019-08-12 PROCEDURE — 96372 THER/PROPH/DIAG INJ SC/IM: CPT

## 2019-08-12 PROCEDURE — 2580000003 HC RX 258: Performed by: EMERGENCY MEDICINE

## 2019-08-12 PROCEDURE — 99283 EMERGENCY DEPT VISIT LOW MDM: CPT

## 2019-08-12 PROCEDURE — 96375 TX/PRO/DX INJ NEW DRUG ADDON: CPT

## 2019-08-12 PROCEDURE — 6360000002 HC RX W HCPCS: Performed by: EMERGENCY MEDICINE

## 2019-08-12 PROCEDURE — 96374 THER/PROPH/DIAG INJ IV PUSH: CPT

## 2019-08-12 RX ORDER — HALOPERIDOL 5 MG/ML
5 INJECTION INTRAMUSCULAR ONCE
Status: COMPLETED | OUTPATIENT
Start: 2019-08-12 | End: 2019-08-12

## 2019-08-12 RX ORDER — ACETAMINOPHEN 500 MG
1000 TABLET ORAL ONCE
Status: DISCONTINUED | OUTPATIENT
Start: 2019-08-12 | End: 2019-08-13 | Stop reason: HOSPADM

## 2019-08-12 RX ORDER — METOCLOPRAMIDE HYDROCHLORIDE 5 MG/ML
10 INJECTION INTRAMUSCULAR; INTRAVENOUS ONCE
Status: COMPLETED | OUTPATIENT
Start: 2019-08-12 | End: 2019-08-12

## 2019-08-12 RX ORDER — KETOROLAC TROMETHAMINE 30 MG/ML
30 INJECTION, SOLUTION INTRAMUSCULAR; INTRAVENOUS ONCE
Status: COMPLETED | OUTPATIENT
Start: 2019-08-12 | End: 2019-08-12

## 2019-08-12 RX ORDER — DEXAMETHASONE SODIUM PHOSPHATE 10 MG/ML
10 INJECTION, SOLUTION INTRAMUSCULAR; INTRAVENOUS ONCE
Status: COMPLETED | OUTPATIENT
Start: 2019-08-12 | End: 2019-08-12

## 2019-08-12 RX ORDER — 0.9 % SODIUM CHLORIDE 0.9 %
1000 INTRAVENOUS SOLUTION INTRAVENOUS ONCE
Status: COMPLETED | OUTPATIENT
Start: 2019-08-12 | End: 2019-08-12

## 2019-08-12 RX ORDER — LORAZEPAM 2 MG/ML
0.5 INJECTION INTRAMUSCULAR ONCE
Status: DISCONTINUED | OUTPATIENT
Start: 2019-08-12 | End: 2019-08-13 | Stop reason: HOSPADM

## 2019-08-12 RX ORDER — ONDANSETRON 2 MG/ML
4 INJECTION INTRAMUSCULAR; INTRAVENOUS ONCE
Status: COMPLETED | OUTPATIENT
Start: 2019-08-12 | End: 2019-08-12

## 2019-08-12 RX ORDER — DIPHENHYDRAMINE HYDROCHLORIDE 50 MG/ML
50 INJECTION INTRAMUSCULAR; INTRAVENOUS ONCE
Status: COMPLETED | OUTPATIENT
Start: 2019-08-12 | End: 2019-08-12

## 2019-08-12 RX ADMIN — ONDANSETRON 4 MG: 2 INJECTION INTRAMUSCULAR; INTRAVENOUS at 22:55

## 2019-08-12 RX ADMIN — SODIUM CHLORIDE 1000 ML: 9 INJECTION, SOLUTION INTRAVENOUS at 22:33

## 2019-08-12 RX ADMIN — HALOPERIDOL LACTATE 5 MG: 5 INJECTION, SOLUTION INTRAMUSCULAR at 21:40

## 2019-08-12 RX ADMIN — KETOROLAC TROMETHAMINE 30 MG: 30 INJECTION, SOLUTION INTRAMUSCULAR; INTRAVENOUS at 22:35

## 2019-08-12 RX ADMIN — DEXAMETHASONE SODIUM PHOSPHATE 10 MG: 10 INJECTION, SOLUTION INTRAMUSCULAR; INTRAVENOUS at 22:34

## 2019-08-12 RX ADMIN — METOCLOPRAMIDE 10 MG: 5 INJECTION, SOLUTION INTRAMUSCULAR; INTRAVENOUS at 22:35

## 2019-08-12 RX ADMIN — DIPHENHYDRAMINE HYDROCHLORIDE 50 MG: 50 INJECTION INTRAMUSCULAR; INTRAVENOUS at 22:34

## 2019-08-12 ASSESSMENT — PAIN SCALES - GENERAL
PAINLEVEL_OUTOF10: 10
PAINLEVEL_OUTOF10: 10

## 2019-08-12 ASSESSMENT — PAIN DESCRIPTION - PAIN TYPE: TYPE: CHRONIC PAIN;ACUTE PAIN

## 2019-08-13 NOTE — ED PROVIDER NOTES
EMERGENCY DEPARTMENT ENCOUNTER    Pt Name: Augie Stoll  MRN: [id number]  [place] 1980  Date of evaluation: 8/12/19  CHIEF COMPLAINT       Chief Complaint   Patient presents with    Migraine    Emesis     HISTORY OF PRESENT ILLNESS   HPI      HISTORY OF PRESENT ILLNESS:  Past medical history of migraine presents for chief complaint of migraine headache. Patient has had 6 days of a migraine headache that is consistent with her migraine headaches. Frontal achy worse with light and sound. Headache was of gradual onset, no neck stiffness, no fevers or chills, not on any blood thinners, no blurry vision. Not associated with any focal neurological deficits, neck pain, chest pain, or pain that radiates to her back. No significant unilateral temporal pain. No significant amnesia. No loss of consciousness. No trauma. Severity is moderate. Timing is 6 days. Course is intermittent.   Context is migraine headaches -----------------------  -----------------------  REVIEW OF SYSTEMS  *see ED Caveat  ED Caveat: [none]  Gen:  No fever  CV: No CP, no palpitations  Resp: No SOB, no respiratory distress  GI: No D, no abd pain  : No dysuria, no increased frequency  Skin: No rash, no purulent lesions  Eyes: No blurry vision, No double vision  MSK: No back pain, no joint pain  Neuro: +HA, no sensation changes  Psych: No SI/HI  -----------------------  -----------------------  ALLERGIES  -per nursing records, reviewed    PAST MEDICAL HISTORY  -See HPI    SOCIAL HISTORY  -No daily drinking, no IV drugs  -----------------------  -----------------------  PHYSICAL EXAM  Gen: no acute distress  Skin: no rashes  Head: Normocephalic, atraumatic  Neck: no nuchal rigidity  Eye: PERRLA, normal conjunctiva  ENT: Mucous membranes moist  CV: Normal rate  Resp: Respirations unlabored  MSK: no large joint effusions  ABD: Non distended  Neuro: Alert and oriented, no focal neurological deficits observed  Psych: Cooperative  -----------------------  -----------------------  MEDICAL DECISION MAKING  Differential Diagnosis:  - Consideration is given for intracranial hemorrhage, meningitis, venous sinus thrombosis, brain tumor, temporal arteritis, IIH, dissection, carbon monoxide exposure, CVA, ACS,      -  #Impression/Plan:  - Clinically patient's presentation is most consistent with migraine headache. Will attempt symptomatic treatment. Patient is feeling better will discharge home  -Patient is feeling better  ##Diagnosis:  -Migraine headache  -  -----------------------  -----------------------  Christopher Bass MD, [facility]  Emergency Medicine Attending  Questions? Please contact my cell phone anytime.   [phone]  *This charting supersedes any ED resident or staff charting and was written using speech recognition software    PASTMEDICAL HISTORY     Past Medical History:   Diagnosis Date    Kidney stone 6/8/2016    Kidney stones     Migraine      SURGICAL HISTORY       Past Surgical History:   Procedure Laterality Date    APPENDECTOMY      CHOLECYSTECTOMY      CYSTOSCOPY  06/068/2016    lt ureteroscopy with holmium laser lithotripsy and lt ureteral stent    ENDOMETRIAL ABLATION      HYSTERECTOMY       CURRENT MEDICATIONS       Discharge Medication List as of 8/12/2019 11:25 PM      CONTINUE these medications which have NOT CHANGED    Details   albuterol sulfate HFA (PROAIR HFA) 108 (90 Base) MCG/ACT inhaler Inhale 2 puffs into the lungs every 6 hours as needed for Wheezing, Disp-1 Inhaler, R-1Normal      benzonatate (TESSALON PERLES) 100 MG capsule Take 1-2 capsules 3 times daily as needed for cough, Disp-30 capsule, R-1Normal      azithromycin (ZITHROMAX) 250 MG tablet Take 2 tablets on day 1 followed by 1 tablet daily thereafter, Disp-6 tablet, R-0Normal      albuterol (PROVENTIL) (2.5 MG/3ML) 0.083% nebulizer solution Take 3 mLs by nebulization every 6 hours as needed for Wheezing, Disp-120 each, R-0Normal

## 2019-10-28 ENCOUNTER — OFFICE VISIT (OUTPATIENT)
Dept: FAMILY MEDICINE CLINIC | Age: 39
End: 2019-10-28
Payer: COMMERCIAL

## 2019-10-28 VITALS
DIASTOLIC BLOOD PRESSURE: 80 MMHG | SYSTOLIC BLOOD PRESSURE: 132 MMHG | WEIGHT: 274 LBS | BODY MASS INDEX: 45.65 KG/M2 | HEIGHT: 65 IN | HEART RATE: 84 BPM | OXYGEN SATURATION: 98 %

## 2019-10-28 DIAGNOSIS — R53.83 FATIGUE, UNSPECIFIED TYPE: ICD-10-CM

## 2019-10-28 DIAGNOSIS — R73.9 HYPERGLYCEMIA: Primary | ICD-10-CM

## 2019-10-28 DIAGNOSIS — E66.01 CLASS 3 SEVERE OBESITY DUE TO EXCESS CALORIES WITHOUT SERIOUS COMORBIDITY WITH BODY MASS INDEX (BMI) OF 45.0 TO 49.9 IN ADULT (HCC): ICD-10-CM

## 2019-10-28 DIAGNOSIS — G43.909 MIGRAINE WITHOUT STATUS MIGRAINOSUS, NOT INTRACTABLE, UNSPECIFIED MIGRAINE TYPE: ICD-10-CM

## 2019-10-28 LAB — HBA1C MFR BLD: 5.3 %

## 2019-10-28 PROCEDURE — G8417 CALC BMI ABV UP PARAM F/U: HCPCS | Performed by: NURSE PRACTITIONER

## 2019-10-28 PROCEDURE — G8484 FLU IMMUNIZE NO ADMIN: HCPCS | Performed by: NURSE PRACTITIONER

## 2019-10-28 PROCEDURE — G8427 DOCREV CUR MEDS BY ELIG CLIN: HCPCS | Performed by: NURSE PRACTITIONER

## 2019-10-28 PROCEDURE — 99214 OFFICE O/P EST MOD 30 MIN: CPT | Performed by: NURSE PRACTITIONER

## 2019-10-28 PROCEDURE — 1036F TOBACCO NON-USER: CPT | Performed by: NURSE PRACTITIONER

## 2019-10-28 PROCEDURE — 83036 HEMOGLOBIN GLYCOSYLATED A1C: CPT | Performed by: NURSE PRACTITIONER

## 2019-10-28 RX ORDER — TOPIRAMATE 50 MG/1
50 TABLET, FILM COATED ORAL DAILY
Qty: 30 TABLET | Refills: 3 | Status: SHIPPED | OUTPATIENT
Start: 2019-10-28 | End: 2020-02-18

## 2019-10-28 ASSESSMENT — ENCOUNTER SYMPTOMS
WHEEZING: 0
NAUSEA: 0
VOMITING: 0
ABDOMINAL PAIN: 0
SHORTNESS OF BREATH: 0

## 2019-11-04 ENCOUNTER — HOSPITAL ENCOUNTER (OUTPATIENT)
Age: 39
Discharge: HOME | End: 2019-11-04
Payer: COMMERCIAL

## 2019-11-04 DIAGNOSIS — R53.83 FATIGUE, UNSPECIFIED TYPE: ICD-10-CM

## 2019-11-04 DIAGNOSIS — R73.9 HYPERGLYCEMIA: ICD-10-CM

## 2019-11-04 DIAGNOSIS — E66.01 CLASS 3 SEVERE OBESITY DUE TO EXCESS CALORIES WITHOUT SERIOUS COMORBIDITY WITH BODY MASS INDEX (BMI) OF 45.0 TO 49.9 IN ADULT (HCC): ICD-10-CM

## 2019-11-04 LAB
ALT SERPL-CCNC: 15 U/L (ref 5–33)
ANION GAP SERPL CALCULATED.3IONS-SCNC: 10 MMOL/L (ref 9–17)
BUN BLDV-MCNC: 10 MG/DL (ref 6–20)
BUN/CREAT BLD: ABNORMAL (ref 9–20)
CALCIUM SERPL-MCNC: 9.3 MG/DL (ref 8.6–10.4)
CHLORIDE BLD-SCNC: 110 MMOL/L (ref 98–107)
CHOLESTEROL/HDL RATIO: 5.2
CHOLESTEROL: 191 MG/DL
CO2: 22 MMOL/L (ref 20–31)
CREAT SERPL-MCNC: 0.81 MG/DL (ref 0.5–0.9)
GFR AFRICAN AMERICAN: >60 ML/MIN
GFR NON-AFRICAN AMERICAN: >60 ML/MIN
GFR SERPL CREATININE-BSD FRML MDRD: ABNORMAL ML/MIN/{1.73_M2}
GFR SERPL CREATININE-BSD FRML MDRD: ABNORMAL ML/MIN/{1.73_M2}
GLUCOSE BLD-MCNC: 93 MG/DL (ref 70–99)
HCT VFR BLD CALC: 43.1 % (ref 36–46)
HDLC SERPL-MCNC: 37 MG/DL
HEMOGLOBIN: 14.3 G/DL (ref 12–16)
LDL CHOLESTEROL: 139 MG/DL (ref 0–130)
POTASSIUM SERPL-SCNC: 4.2 MMOL/L (ref 3.7–5.3)
SODIUM BLD-SCNC: 142 MMOL/L (ref 135–144)
TRIGL SERPL-MCNC: 74 MG/DL
TSH SERPL DL<=0.05 MIU/L-ACNC: 1.37 MIU/L (ref 0.3–5)
VITAMIN B-12: 367 PG/ML (ref 232–1245)
VITAMIN D 25-HYDROXY: 23.8 NG/ML (ref 30–100)
VLDLC SERPL CALC-MCNC: ABNORMAL MG/DL (ref 1–30)

## 2019-11-04 PROCEDURE — 84443 ASSAY THYROID STIM HORMONE: CPT

## 2019-11-04 PROCEDURE — 80061 LIPID PANEL: CPT

## 2019-11-04 PROCEDURE — 36415 COLL VENOUS BLD VENIPUNCTURE: CPT

## 2019-11-04 PROCEDURE — 84460 ALANINE AMINO (ALT) (SGPT): CPT

## 2019-11-04 PROCEDURE — 82607 VITAMIN B-12: CPT

## 2019-11-04 PROCEDURE — 80048 BASIC METABOLIC PNL TOTAL CA: CPT

## 2019-11-04 PROCEDURE — 82306 VITAMIN D 25 HYDROXY: CPT

## 2019-11-04 PROCEDURE — 85014 HEMATOCRIT: CPT

## 2019-11-04 PROCEDURE — 85018 HEMOGLOBIN: CPT

## 2019-11-04 RX ORDER — ERGOCALCIFEROL 1.25 MG/1
50000 CAPSULE ORAL WEEKLY
Qty: 12 CAPSULE | Refills: 1 | Status: SHIPPED | OUTPATIENT
Start: 2019-11-04 | End: 2020-04-13

## 2019-11-04 RX ORDER — ERGOCALCIFEROL 1.25 MG/1
50000 CAPSULE ORAL WEEKLY
Qty: 12 CAPSULE | Refills: 1 | Status: SHIPPED | OUTPATIENT
Start: 2019-11-04 | End: 2019-11-04 | Stop reason: SDUPTHER

## 2019-12-26 ENCOUNTER — APPOINTMENT (OUTPATIENT)
Dept: GENERAL RADIOLOGY | Age: 39
End: 2019-12-26
Payer: COMMERCIAL

## 2019-12-26 ENCOUNTER — HOSPITAL ENCOUNTER (EMERGENCY)
Age: 39
Discharge: HOME | End: 2019-12-26
Attending: EMERGENCY MEDICINE
Payer: COMMERCIAL

## 2019-12-26 VITALS
HEART RATE: 74 BPM | WEIGHT: 270 LBS | SYSTOLIC BLOOD PRESSURE: 114 MMHG | HEIGHT: 65 IN | OXYGEN SATURATION: 98 % | BODY MASS INDEX: 44.98 KG/M2 | DIASTOLIC BLOOD PRESSURE: 78 MMHG | TEMPERATURE: 98.2 F | RESPIRATION RATE: 20 BRPM

## 2019-12-26 DIAGNOSIS — J40 BRONCHITIS: Primary | ICD-10-CM

## 2019-12-26 LAB
DIRECT EXAM: NORMAL
DIRECT EXAM: NORMAL
Lab: NORMAL
Lab: NORMAL
SPECIMEN DESCRIPTION: NORMAL
SPECIMEN DESCRIPTION: NORMAL

## 2019-12-26 PROCEDURE — 94640 AIRWAY INHALATION TREATMENT: CPT

## 2019-12-26 PROCEDURE — 87880 STREP A ASSAY W/OPTIC: CPT

## 2019-12-26 PROCEDURE — 99285 EMERGENCY DEPT VISIT HI MDM: CPT

## 2019-12-26 PROCEDURE — 6370000000 HC RX 637 (ALT 250 FOR IP): Performed by: PHYSICIAN ASSISTANT

## 2019-12-26 PROCEDURE — 6370000000 HC RX 637 (ALT 250 FOR IP)

## 2019-12-26 PROCEDURE — 71046 X-RAY EXAM CHEST 2 VIEWS: CPT

## 2019-12-26 PROCEDURE — 87804 INFLUENZA ASSAY W/OPTIC: CPT

## 2019-12-26 RX ORDER — PREDNISONE 20 MG/1
20 TABLET ORAL DAILY
Qty: 5 TABLET | Refills: 0 | Status: SHIPPED | OUTPATIENT
Start: 2019-12-26 | End: 2019-12-31

## 2019-12-26 RX ORDER — PREDNISONE 20 MG/1
20 TABLET ORAL ONCE
Status: COMPLETED | OUTPATIENT
Start: 2019-12-26 | End: 2019-12-26

## 2019-12-26 RX ORDER — IBUPROFEN 600 MG/1
600 TABLET ORAL ONCE
Status: COMPLETED | OUTPATIENT
Start: 2019-12-26 | End: 2019-12-26

## 2019-12-26 RX ORDER — BENZONATATE 100 MG/1
100 CAPSULE ORAL 3 TIMES DAILY PRN
Qty: 20 CAPSULE | Refills: 0 | Status: SHIPPED | OUTPATIENT
Start: 2019-12-26 | End: 2020-01-02

## 2019-12-26 RX ORDER — IPRATROPIUM BROMIDE AND ALBUTEROL SULFATE 2.5; .5 MG/3ML; MG/3ML
1 SOLUTION RESPIRATORY (INHALATION)
Status: CANCELLED | OUTPATIENT
Start: 2019-12-26

## 2019-12-26 RX ORDER — IPRATROPIUM BROMIDE AND ALBUTEROL SULFATE 2.5; .5 MG/3ML; MG/3ML
SOLUTION RESPIRATORY (INHALATION)
Status: COMPLETED
Start: 2019-12-26 | End: 2019-12-26

## 2019-12-26 RX ORDER — IPRATROPIUM BROMIDE AND ALBUTEROL SULFATE 2.5; .5 MG/3ML; MG/3ML
1 SOLUTION RESPIRATORY (INHALATION)
Status: DISCONTINUED | OUTPATIENT
Start: 2019-12-27 | End: 2019-12-26

## 2019-12-26 RX ORDER — IPRATROPIUM BROMIDE AND ALBUTEROL SULFATE 2.5; .5 MG/3ML; MG/3ML
2 SOLUTION RESPIRATORY (INHALATION)
Status: DISCONTINUED | OUTPATIENT
Start: 2019-12-27 | End: 2019-12-26 | Stop reason: HOSPADM

## 2019-12-26 RX ADMIN — IPRATROPIUM BROMIDE AND ALBUTEROL SULFATE 1 AMPULE: .5; 3 SOLUTION RESPIRATORY (INHALATION) at 20:56

## 2019-12-26 RX ADMIN — IBUPROFEN 600 MG: 600 TABLET, FILM COATED ORAL at 20:25

## 2019-12-26 RX ADMIN — PREDNISONE 20 MG: 20 TABLET ORAL at 20:25

## 2019-12-26 RX ADMIN — IPRATROPIUM BROMIDE AND ALBUTEROL SULFATE 1 AMPULE: .5; 3 SOLUTION RESPIRATORY (INHALATION) at 21:36

## 2019-12-26 RX ADMIN — IPRATROPIUM BROMIDE AND ALBUTEROL SULFATE 3 ML: .5; 3 SOLUTION RESPIRATORY (INHALATION) at 20:55

## 2019-12-26 ASSESSMENT — ENCOUNTER SYMPTOMS
RHINORRHEA: 1
COUGH: 1
SORE THROAT: 1
WHEEZING: 0

## 2019-12-26 ASSESSMENT — PAIN DESCRIPTION - LOCATION: LOCATION: EAR;THROAT

## 2019-12-26 ASSESSMENT — PAIN DESCRIPTION - PAIN TYPE: TYPE: ACUTE PAIN

## 2019-12-26 ASSESSMENT — PAIN SCALES - GENERAL: PAINLEVEL_OUTOF10: 5

## 2019-12-30 ENCOUNTER — OFFICE VISIT (OUTPATIENT)
Dept: FAMILY MEDICINE CLINIC | Age: 39
End: 2019-12-30
Payer: COMMERCIAL

## 2019-12-30 VITALS
TEMPERATURE: 97.2 F | BODY MASS INDEX: 44.6 KG/M2 | SYSTOLIC BLOOD PRESSURE: 128 MMHG | RESPIRATION RATE: 16 BRPM | WEIGHT: 268 LBS | DIASTOLIC BLOOD PRESSURE: 88 MMHG | HEART RATE: 64 BPM

## 2019-12-30 DIAGNOSIS — R05.3 PERSISTENT COUGH: Primary | ICD-10-CM

## 2019-12-30 DIAGNOSIS — J20.9 ACUTE BRONCHITIS, UNSPECIFIED ORGANISM: ICD-10-CM

## 2019-12-30 PROCEDURE — 1036F TOBACCO NON-USER: CPT | Performed by: NURSE PRACTITIONER

## 2019-12-30 PROCEDURE — 99214 OFFICE O/P EST MOD 30 MIN: CPT | Performed by: NURSE PRACTITIONER

## 2019-12-30 PROCEDURE — G8417 CALC BMI ABV UP PARAM F/U: HCPCS | Performed by: NURSE PRACTITIONER

## 2019-12-30 PROCEDURE — G8484 FLU IMMUNIZE NO ADMIN: HCPCS | Performed by: NURSE PRACTITIONER

## 2019-12-30 PROCEDURE — G8427 DOCREV CUR MEDS BY ELIG CLIN: HCPCS | Performed by: NURSE PRACTITIONER

## 2019-12-30 RX ORDER — GUAIFENESIN AND DEXTROMETHORPHAN HYDROBROMIDE 600; 30 MG/1; MG/1
1 TABLET, EXTENDED RELEASE ORAL 2 TIMES DAILY
Qty: 28 TABLET | Refills: 0 | Status: SHIPPED | OUTPATIENT
Start: 2019-12-30 | End: 2020-06-30 | Stop reason: ALTCHOICE

## 2019-12-30 RX ORDER — AZITHROMYCIN 250 MG/1
TABLET, FILM COATED ORAL
Qty: 1 PACKET | Refills: 0 | Status: SHIPPED | OUTPATIENT
Start: 2019-12-30 | End: 2020-01-09

## 2019-12-30 RX ORDER — ALBUTEROL SULFATE 90 UG/1
2 AEROSOL, METERED RESPIRATORY (INHALATION) EVERY 6 HOURS PRN
Qty: 1 INHALER | Refills: 0 | Status: SHIPPED | OUTPATIENT
Start: 2019-12-30 | End: 2021-01-05 | Stop reason: ALTCHOICE

## 2019-12-30 ASSESSMENT — ENCOUNTER SYMPTOMS
COUGH: 1
RHINORRHEA: 1
SORE THROAT: 1
NAUSEA: 0
SINUS PRESSURE: 1
SHORTNESS OF BREATH: 1
ABDOMINAL PAIN: 0
VOICE CHANGE: 1

## 2020-02-18 RX ORDER — TOPIRAMATE 50 MG/1
TABLET, FILM COATED ORAL
Qty: 30 TABLET | Refills: 0 | Status: SHIPPED | OUTPATIENT
Start: 2020-02-18 | End: 2020-02-28 | Stop reason: SDUPTHER

## 2020-02-28 ENCOUNTER — OFFICE VISIT (OUTPATIENT)
Dept: FAMILY MEDICINE CLINIC | Age: 40
End: 2020-02-28
Payer: COMMERCIAL

## 2020-02-28 VITALS
HEART RATE: 64 BPM | WEIGHT: 248 LBS | SYSTOLIC BLOOD PRESSURE: 102 MMHG | DIASTOLIC BLOOD PRESSURE: 70 MMHG | BODY MASS INDEX: 41.27 KG/M2 | TEMPERATURE: 97 F | RESPIRATION RATE: 16 BRPM

## 2020-02-28 PROCEDURE — 1036F TOBACCO NON-USER: CPT | Performed by: NURSE PRACTITIONER

## 2020-02-28 PROCEDURE — G8484 FLU IMMUNIZE NO ADMIN: HCPCS | Performed by: NURSE PRACTITIONER

## 2020-02-28 PROCEDURE — G8427 DOCREV CUR MEDS BY ELIG CLIN: HCPCS | Performed by: NURSE PRACTITIONER

## 2020-02-28 PROCEDURE — G8417 CALC BMI ABV UP PARAM F/U: HCPCS | Performed by: NURSE PRACTITIONER

## 2020-02-28 PROCEDURE — 99214 OFFICE O/P EST MOD 30 MIN: CPT | Performed by: NURSE PRACTITIONER

## 2020-02-28 RX ORDER — RIZATRIPTAN BENZOATE 10 MG/1
10 TABLET ORAL
Qty: 9 TABLET | Refills: 2 | Status: SHIPPED | OUTPATIENT
Start: 2020-02-28 | End: 2020-06-30 | Stop reason: SDUPTHER

## 2020-02-28 RX ORDER — TOPIRAMATE 50 MG/1
TABLET, FILM COATED ORAL
Qty: 90 TABLET | Refills: 0 | Status: SHIPPED | OUTPATIENT
Start: 2020-02-28 | End: 2020-06-30 | Stop reason: SDUPTHER

## 2020-02-28 ASSESSMENT — ENCOUNTER SYMPTOMS
WHEEZING: 0
NAUSEA: 0
SHORTNESS OF BREATH: 0
ABDOMINAL PAIN: 0
VOMITING: 0

## 2020-02-28 NOTE — PROGRESS NOTES
Subjective:      Patient ID: Mert Baker is a 44 y.o. female. Visit Information    Have you changed or started any medications since your last visit including any over-the-counter medicines, vitamins, or herbal medicines? no   Are you having any side effects from any of your medications? -  no  Have you stopped taking any of your medications? Is so, why? -  no    Have you seen any other physician or provider since your last visit? No  Have you had any other diagnostic tests since your last visit? No  Have you been seen in the emergency room and/or had an admission to a hospital since we last saw you? No  Have you had your routine dental cleaning in the past 6 months? no    Have you activated your [employer] account? If not, what are your barriers? Yes     Patient Care Team:  La Nena Abreu MD as PCP - General (Family Medicine)  La Nena Abreu MD as PCP - [facility] Provider    Medical History Review  Past Medical, Family, and Social History reviewed and does not contribute to the patient presenting condition    Health Maintenance   Topic Date Due    Varicella vaccine (1 of 2 - 2-dose childhood series) 09/23/1981    DTaP/Tdap/Td vaccine (1 - Tdap) 09/23/1991    HIV screen  09/23/1995    Cervical cancer screen  09/23/2001    Flu vaccine (1) 09/01/2019    Shingles Vaccine (1 of 2) 09/23/2030    Hepatitis A vaccine  Aged Out    Hepatitis B vaccine  Aged Out    Hib vaccine  Aged Out    Meningococcal (ACWY) vaccine  Aged Out    Pneumococcal 0-64 years Vaccine  Aged Out     HPI     44year old female presents with management of IFG HLD vit d deficiency morbid obesity and migraine headache with medication refills. Recent blood tests showed low vit d and cholesterol. Has been on diet measures. States she cut down pop and controls portion. She has lost 26 ibs since oct. topamax was resumed in oct but states she still has intermittent headache lasting a day.  States she has been under a lot of stress. She works 2 jobs. fhx of brain aneurysm     Review of Systems   Constitutional: Negative for chills and fever. Eyes: Negative for visual disturbance. Respiratory: Negative for shortness of breath and wheezing. Cardiovascular: Negative for chest pain and leg swelling. Gastrointestinal: Negative for abdominal pain, nausea and vomiting. Neurological: Positive for headaches. Negative for dizziness, weakness and numbness. Psychiatric/Behavioral: Negative for agitation and behavioral problems. Objective:   Physical Exam  Vitals signs and nursing note reviewed. Constitutional:       General: She is not in acute distress. Appearance: Normal appearance. HENT:      Nose: Nose normal. No congestion. Eyes:      General: No scleral icterus. Conjunctiva/sclera: Conjunctivae normal.   Neck:      Musculoskeletal: Normal range of motion and neck supple. Cardiovascular:      Rate and Rhythm: Normal rate and regular rhythm. Pulses: Normal pulses. Heart sounds: Normal heart sounds. Pulmonary:      Effort: Pulmonary effort is normal. No respiratory distress. Breath sounds: Normal breath sounds. Abdominal:      Palpations: Abdomen is soft. Tenderness: There is no abdominal tenderness. Musculoskeletal: Normal range of motion. General: No tenderness. Skin:     General: Skin is warm and dry. Neurological:      Mental Status: She is alert and oriented to person, place, and time. Cranial Nerves: No cranial nerve deficit. Psychiatric:         Mood and Affect: Mood normal.         Behavior: Behavior normal.         Assessment:      1. IFG (impaired fasting glucose)    2. Mixed hyperlipidemia    3. Vitamin D deficiency    4.  Migraine without status migrainosus, not intractable, unspecified migraine type            Plan:      BP Readings from Last 3 Encounters:   02/28/20 102/70   12/30/19 128/88   12/26/19 114/78     /70 (Site: Right Upper Arm, Position: Sitting, Cuff Size: Large Adult)   Pulse 64   Temp 97 °F (36.1 °C) (Oral)   Resp 16   Wt 248 lb (112.5 kg)   BMI 41.27 kg/m²   Lab Results   Component Value Date    WBC 10.6 12/27/2018    HGB 14.3 11/04/2019    HCT 43.1 11/04/2019     12/27/2018    CHOL 191 11/04/2019    TRIG 74 11/04/2019    HDL 37 (L) 11/04/2019    ALT 15 11/04/2019    AST 19 12/27/2018     11/04/2019    K 4.2 11/04/2019     (H) 11/04/2019    CREATININE 0.81 11/04/2019    BUN 10 11/04/2019    CO2 22 11/04/2019    TSH 1.37 11/04/2019    INR 1.0 07/24/2015    LABA1C 5.3 10/28/2019     Lab Results   Component Value Date    CALCIUM 9.3 11/04/2019     Lab Results   Component Value Date    LDLCHOLESTEROL 139 (H) 11/04/2019         1. IFG (impaired fasting glucose)  - cont diet measures. - extensive discussion with pt about his current diet and exercise habits, and personalized advice was provided regarding recommended lifestyle changes, diet and exercise. 2. Mixed hyperlipidemia  - cont diet measures. - Diet, exercise and weight reduction discussed. 3. Vitamin D deficiency  - cont weekly supplements     4. Migraine without status migrainosus, not intractable, unspecified migraine type  - cont topamax as prophylactic therapy, add maxalt as abortive therapy   - advised to keep migraine diary   - topiramate (TOPAMAX) 50 MG tablet; TAKE 1 TABLET BY MOUTH ONCE DAILY  Dispense: 90 tablet; Refill: 0  - rizatriptan (MAXALT) 10 MG tablet; Take 1 tablet by mouth once as needed for Migraine May repeat in 2 hours if needed  Dispense: 9 tablet; Refill: 2  - Ct head if pain persists.      Requested Prescriptions     Signed Prescriptions Disp Refills    topiramate (TOPAMAX) 50 MG tablet 90 tablet 0     Sig: TAKE 1 TABLET BY MOUTH ONCE DAILY    rizatriptan (MAXALT) 10 MG tablet 9 tablet 2     Sig: Take 1 tablet by mouth once as needed for Migraine May repeat in 2 hours if needed       Medications Discontinued During This Encounter   Medication Reason    topiramate (TOPAMAX) 50 MG tablet REORDER       Discussed use, benefit, and side effects of prescribed medications. Barriers to medication compliance addressed. All patient questions answered. Pt voiced understanding. No follow-ups on file.

## 2020-04-13 RX ORDER — ERGOCALCIFEROL 1.25 MG/1
50000 CAPSULE ORAL WEEKLY
Qty: 12 CAPSULE | Refills: 0 | Status: SHIPPED | OUTPATIENT
Start: 2020-04-13 | End: 2020-06-30 | Stop reason: SDUPTHER

## 2020-06-09 ENCOUNTER — HOSPITAL ENCOUNTER (OUTPATIENT)
Dept: CT IMAGING | Age: 40
Discharge: HOME | End: 2020-06-11
Payer: COMMERCIAL

## 2020-06-09 PROCEDURE — 70450 CT HEAD/BRAIN W/O DYE: CPT

## 2020-06-30 ENCOUNTER — OFFICE VISIT (OUTPATIENT)
Dept: FAMILY MEDICINE CLINIC | Age: 40
End: 2020-06-30
Payer: COMMERCIAL

## 2020-06-30 VITALS
SYSTOLIC BLOOD PRESSURE: 108 MMHG | DIASTOLIC BLOOD PRESSURE: 80 MMHG | HEART RATE: 76 BPM | BODY MASS INDEX: 42.43 KG/M2 | WEIGHT: 255 LBS | RESPIRATION RATE: 18 BRPM | TEMPERATURE: 98.7 F

## 2020-06-30 PROCEDURE — G8417 CALC BMI ABV UP PARAM F/U: HCPCS | Performed by: NURSE PRACTITIONER

## 2020-06-30 PROCEDURE — G8427 DOCREV CUR MEDS BY ELIG CLIN: HCPCS | Performed by: NURSE PRACTITIONER

## 2020-06-30 PROCEDURE — 99214 OFFICE O/P EST MOD 30 MIN: CPT | Performed by: NURSE PRACTITIONER

## 2020-06-30 PROCEDURE — 1036F TOBACCO NON-USER: CPT | Performed by: NURSE PRACTITIONER

## 2020-06-30 RX ORDER — RIZATRIPTAN BENZOATE 10 MG/1
10 TABLET ORAL
Qty: 9 TABLET | Refills: 2 | Status: SHIPPED | OUTPATIENT
Start: 2020-06-30 | End: 2021-01-17 | Stop reason: SDUPTHER

## 2020-06-30 RX ORDER — TOPIRAMATE 50 MG/1
TABLET, FILM COATED ORAL
Qty: 90 TABLET | Refills: 1 | Status: SHIPPED | OUTPATIENT
Start: 2020-06-30 | End: 2021-01-05 | Stop reason: SDUPTHER

## 2020-06-30 RX ORDER — ERGOCALCIFEROL 1.25 MG/1
50000 CAPSULE ORAL WEEKLY
Qty: 12 CAPSULE | Refills: 0 | Status: SHIPPED | OUTPATIENT
Start: 2020-06-30 | End: 2020-09-17 | Stop reason: SDUPTHER

## 2020-06-30 SDOH — ECONOMIC STABILITY: FOOD INSECURITY: WITHIN THE PAST 12 MONTHS, THE FOOD YOU BOUGHT JUST DIDN'T LAST AND YOU DIDN'T HAVE MONEY TO GET MORE.: NEVER TRUE

## 2020-06-30 SDOH — ECONOMIC STABILITY: INCOME INSECURITY: HOW HARD IS IT FOR YOU TO PAY FOR THE VERY BASICS LIKE FOOD, HOUSING, MEDICAL CARE, AND HEATING?: NOT HARD AT ALL

## 2020-06-30 SDOH — ECONOMIC STABILITY: FOOD INSECURITY: WITHIN THE PAST 12 MONTHS, YOU WORRIED THAT YOUR FOOD WOULD RUN OUT BEFORE YOU GOT MONEY TO BUY MORE.: NEVER TRUE

## 2020-06-30 ASSESSMENT — ENCOUNTER SYMPTOMS
WHEEZING: 0
SHORTNESS OF BREATH: 0
NAUSEA: 0
ABDOMINAL PAIN: 0
VOMITING: 0

## 2020-06-30 ASSESSMENT — PATIENT HEALTH QUESTIONNAIRE - PHQ9
SUM OF ALL RESPONSES TO PHQ QUESTIONS 1-9: 0
SUM OF ALL RESPONSES TO PHQ9 QUESTIONS 1 & 2: 0
SUM OF ALL RESPONSES TO PHQ QUESTIONS 1-9: 0
1. LITTLE INTEREST OR PLEASURE IN DOING THINGS: 0
2. FEELING DOWN, DEPRESSED OR HOPELESS: 0

## 2020-06-30 NOTE — PROGRESS NOTES
very well till recently. fhx of brain aneurysm     Review of Systems   Constitutional: Negative for chills and fever. Eyes: Negative for visual disturbance. Respiratory: Negative for shortness of breath and wheezing. Cardiovascular: Negative for chest pain and leg swelling. Gastrointestinal: Negative for abdominal pain, nausea and vomiting. Neurological: Positive for headaches. Negative for dizziness, weakness and numbness. Psychiatric/Behavioral: Negative for agitation and behavioral problems. Objective:   Physical Exam  Vitals signs and nursing note reviewed. Constitutional:       General: She is not in acute distress. Appearance: Normal appearance. She is obese. HENT:      Nose: Nose normal. No congestion. Eyes:      General: No scleral icterus. Conjunctiva/sclera: Conjunctivae normal.   Neck:      Musculoskeletal: Normal range of motion and neck supple. Cardiovascular:      Rate and Rhythm: Normal rate and regular rhythm. Pulses: Normal pulses. Heart sounds: Normal heart sounds. Pulmonary:      Effort: Pulmonary effort is normal. No respiratory distress. Breath sounds: Normal breath sounds. Abdominal:      Palpations: Abdomen is soft. Tenderness: There is no abdominal tenderness. Musculoskeletal: Normal range of motion. General: No tenderness. Skin:     General: Skin is warm and dry. Neurological:      Mental Status: She is alert and oriented to person, place, and time. Cranial Nerves: No cranial nerve deficit. Psychiatric:         Mood and Affect: Mood normal.         Behavior: Behavior normal.         Assessment:      1. Class 3 severe obesity due to excess calories without serious comorbidity with body mass index (BMI) of 45.0 to 49.9 in adult [facility])    2. IFG (impaired fasting glucose)    3. Mixed hyperlipidemia    4. Vitamin D deficiency    5.  Migraine without status migrainosus, not intractable, unspecified migraine type Plan:       BP Readings from Last 3 Encounters:   06/30/20 108/80   02/28/20 102/70   12/30/19 128/88     /80 (Site: Right Upper Arm, Position: Sitting, Cuff Size: Large Adult)   Pulse 76   Temp 98.7 °F (37.1 °C) (Oral)   Resp 18   Wt 255 lb (115.7 kg)   BMI 42.43 kg/m²   Lab Results   Component Value Date    WBC 10.6 12/27/2018    HGB 14.3 11/04/2019    HCT 43.1 11/04/2019     12/27/2018    CHOL 191 11/04/2019    TRIG 74 11/04/2019    HDL 37 (L) 11/04/2019    ALT 15 11/04/2019    AST 19 12/27/2018     11/04/2019    K 4.2 11/04/2019     (H) 11/04/2019    CREATININE 0.81 11/04/2019    BUN 10 11/04/2019    CO2 22 11/04/2019    TSH 1.37 11/04/2019    INR 1.0 07/24/2015    LABA1C 5.3 10/28/2019     Lab Results   Component Value Date    CALCIUM 9.3 11/04/2019     Lab Results   Component Value Date    LDLCHOLESTEROL 139 (H) 11/04/2019         1. IFG (impaired fasting glucose)  - cont diet measures. - Basic Metabolic Panel; Future    2. Mixed hyperlipidemia  - cont diet measures. 3. Vitamin D deficiency  - Vitamin D 25 Hydroxy; Future  - vitamin D (ERGOCALCIFEROL) 1.25 MG (35361 UT) CAPS capsule; Take 1 capsule by mouth once a week  Dispense: 12 capsule; Refill: 0    4. Migraine without status migrainosus, not intractable, unspecified migraine type  - cont current therapy   - topiramate (TOPAMAX) 50 MG tablet; TAKE 1 TABLET BY MOUTH ONCE DAILY  Dispense: 90 tablet; Refill: 1  - rizatriptan (MAXALT) 10 MG tablet; Take 1 tablet by mouth once as needed for Migraine May repeat in 2 hours if needed  Dispense: 9 tablet; Refill: 2    5. Class 3 severe obesity due to excess calories without serious comorbidity with body mass index (BMI) of 45.0 to 49.9 in adult [facility])  - Lipid Panel; Future  - extensive discussion with pt about his current diet and exercise habits, and personalized advice was provided regarding recommended lifestyle changes, diet and exercise.          Requested Prescriptions

## 2020-09-17 ENCOUNTER — HOSPITAL ENCOUNTER (OUTPATIENT)
Age: 40
Discharge: HOME | End: 2020-09-17
Payer: COMMERCIAL

## 2020-09-17 LAB
ANION GAP SERPL CALCULATED.3IONS-SCNC: 9 MMOL/L (ref 9–17)
BUN BLDV-MCNC: 8 MG/DL (ref 6–20)
BUN/CREAT BLD: ABNORMAL (ref 9–20)
CALCIUM SERPL-MCNC: 8.4 MG/DL (ref 8.6–10.4)
CHLORIDE BLD-SCNC: 110 MMOL/L (ref 98–107)
CHOLESTEROL/HDL RATIO: 4.8
CHOLESTEROL: 187 MG/DL
CO2: 22 MMOL/L (ref 20–31)
CREAT SERPL-MCNC: 0.85 MG/DL (ref 0.5–0.9)
GFR AFRICAN AMERICAN: >60 ML/MIN
GFR NON-AFRICAN AMERICAN: >60 ML/MIN
GFR SERPL CREATININE-BSD FRML MDRD: ABNORMAL ML/MIN/{1.73_M2}
GFR SERPL CREATININE-BSD FRML MDRD: ABNORMAL ML/MIN/{1.73_M2}
GLUCOSE BLD-MCNC: 93 MG/DL (ref 70–99)
HDLC SERPL-MCNC: 39 MG/DL
LDL CHOLESTEROL: 132 MG/DL (ref 0–130)
POTASSIUM SERPL-SCNC: 4.2 MMOL/L (ref 3.7–5.3)
SODIUM BLD-SCNC: 141 MMOL/L (ref 135–144)
TRIGL SERPL-MCNC: 81 MG/DL
VITAMIN D 25-HYDROXY: 28.6 NG/ML (ref 30–100)
VLDLC SERPL CALC-MCNC: ABNORMAL MG/DL (ref 1–30)

## 2020-09-17 PROCEDURE — 82306 VITAMIN D 25 HYDROXY: CPT

## 2020-09-17 PROCEDURE — 80061 LIPID PANEL: CPT

## 2020-09-17 PROCEDURE — 80048 BASIC METABOLIC PNL TOTAL CA: CPT

## 2020-09-17 PROCEDURE — 36415 COLL VENOUS BLD VENIPUNCTURE: CPT

## 2020-09-17 RX ORDER — ERGOCALCIFEROL 1.25 MG/1
50000 CAPSULE ORAL WEEKLY
Qty: 12 CAPSULE | Refills: 1 | Status: SHIPPED | OUTPATIENT
Start: 2020-09-17 | End: 2021-01-05 | Stop reason: SDUPTHER

## 2021-01-05 ENCOUNTER — OFFICE VISIT (OUTPATIENT)
Dept: FAMILY MEDICINE CLINIC | Age: 41
End: 2021-01-05
Payer: COMMERCIAL

## 2021-01-05 VITALS
WEIGHT: 255 LBS | DIASTOLIC BLOOD PRESSURE: 80 MMHG | TEMPERATURE: 96.9 F | HEIGHT: 65 IN | SYSTOLIC BLOOD PRESSURE: 136 MMHG | RESPIRATION RATE: 16 BRPM | BODY MASS INDEX: 42.49 KG/M2 | HEART RATE: 84 BPM

## 2021-01-05 DIAGNOSIS — L23.9 ALLERGIC CONTACT DERMATITIS, UNSPECIFIED TRIGGER: Primary | ICD-10-CM

## 2021-01-05 DIAGNOSIS — G43.909 MIGRAINE WITHOUT STATUS MIGRAINOSUS, NOT INTRACTABLE, UNSPECIFIED MIGRAINE TYPE: ICD-10-CM

## 2021-01-05 DIAGNOSIS — E55.9 VITAMIN D DEFICIENCY: ICD-10-CM

## 2021-01-05 PROCEDURE — 99214 OFFICE O/P EST MOD 30 MIN: CPT | Performed by: NURSE PRACTITIONER

## 2021-01-05 PROCEDURE — G8484 FLU IMMUNIZE NO ADMIN: HCPCS | Performed by: NURSE PRACTITIONER

## 2021-01-05 PROCEDURE — 1036F TOBACCO NON-USER: CPT | Performed by: NURSE PRACTITIONER

## 2021-01-05 PROCEDURE — G8417 CALC BMI ABV UP PARAM F/U: HCPCS | Performed by: NURSE PRACTITIONER

## 2021-01-05 PROCEDURE — G8427 DOCREV CUR MEDS BY ELIG CLIN: HCPCS | Performed by: NURSE PRACTITIONER

## 2021-01-05 RX ORDER — ERGOCALCIFEROL 1.25 MG/1
50000 CAPSULE ORAL WEEKLY
Qty: 12 CAPSULE | Refills: 1 | Status: SHIPPED | OUTPATIENT
Start: 2021-01-05 | End: 2021-08-30 | Stop reason: SDUPTHER

## 2021-01-05 RX ORDER — HYDROXYZINE HYDROCHLORIDE 25 MG/1
25 TABLET, FILM COATED ORAL EVERY 8 HOURS PRN
Qty: 30 TABLET | Refills: 0 | Status: SHIPPED | OUTPATIENT
Start: 2021-01-05 | End: 2021-01-19 | Stop reason: SDUPTHER

## 2021-01-05 RX ORDER — PREDNISONE 10 MG/1
TABLET ORAL
Qty: 18 TABLET | Refills: 0 | Status: SHIPPED | OUTPATIENT
Start: 2021-01-05 | End: 2021-01-15

## 2021-01-05 RX ORDER — TOPIRAMATE 50 MG/1
TABLET, FILM COATED ORAL
Qty: 90 TABLET | Refills: 1 | Status: SHIPPED | OUTPATIENT
Start: 2021-01-05 | End: 2021-05-14

## 2021-01-05 ASSESSMENT — PATIENT HEALTH QUESTIONNAIRE - PHQ9
SUM OF ALL RESPONSES TO PHQ QUESTIONS 1-9: 0
2. FEELING DOWN, DEPRESSED OR HOPELESS: 0
SUM OF ALL RESPONSES TO PHQ9 QUESTIONS 1 & 2: 0

## 2021-01-05 ASSESSMENT — ENCOUNTER SYMPTOMS
NAUSEA: 0
ABDOMINAL PAIN: 0
WHEEZING: 0
VOMITING: 0
SHORTNESS OF BREATH: 0

## 2021-01-05 NOTE — PROGRESS NOTES
Lashawn Winters (:  1980) is a 36 y.o. female,Established patient, here for evaluation of the following chief complaint(s):  Urticaria, migraine headache and vit d deficiency with medication refills. Health Maintenance (PAP, Tdap), and Palpitations (Patient states use to see a cardiologist, but has been unable to get back in touch with them. )    Visit Information    Have you changed or started any medications since your last visit including any over-the-counter medicines, vitamins, or herbal medicines? no   Are you having any side effects from any of your medications? -  no  Have you stopped taking any of your medications? Is so, why? -  yes - Multi-Vitamin    Have you seen any other physician or provider since your last visit? No  Have you had any other diagnostic tests since your last visit? Yes - Records Obtained  Have you been seen in the emergency room and/or had an admission to a hospital since we last saw you? No  Have you had your routine dental cleaning in the past 6 months? no    Have you activated your [employer] account? If not, what are your barriers?  Yes     Patient Care Team:  Shilpa Antoine MD as PCP - General (Family Medicine)  Shil[facility]    Medical History Review  Past Medical, Family, and Social History reviewed and does contribute to the patient presenting condition    Health Maintenance   Topic Date Due    Hepatitis C screen  1980    Varicella vaccine (1 of 2 - 2-dose childhood series) 1981    HIV screen  1995    DTaP/Tdap/Td vaccine (1 - Tdap) 1999    Cervical cancer screen  2001    Flu vaccine (1) 2020    Lipid screen  2025    Hepatitis A vaccine  Aged Out    Hepatitis B vaccine  Aged Out    Hib vaccine  Aged Out    Meningococcal (ACWY) vaccine  Aged Out    Pneumococcal 0-64 years Vaccine  Aged Out          SUBJECTIVE/OBJECTIVE:  HPI 44year old female presents with rash migraine headache and vit d deficiency with medication refills. states she has been experiencing rash for the past 4 weeks. States she gets them all over and they are very itchy. she has not changed any soaps, detergents or skin care products. No recent new medications. She denies recent fever chills cough or nasal congestion. Was placed  on topamax and maxalt as needed for headache and states they work very well. Recent CT head was unremarkable. Is on weekly vit d supplements. Hx of morbid obesity and is on diet measures. Review of Systems   Constitutional: Negative for chills and fever. Eyes: Negative for visual disturbance. Respiratory: Negative for shortness of breath and wheezing. Cardiovascular: Negative for chest pain and leg swelling. Gastrointestinal: Negative for abdominal pain, nausea and vomiting. Skin: Positive for rash. Neurological: Negative for dizziness, weakness and numbness. Psychiatric/Behavioral: Negative for agitation and behavioral problems. Physical Exam  Vitals signs and nursing note reviewed. Constitutional:       General: She is not in acute distress. Appearance: Normal appearance. She is obese. HENT:      Nose: Nose normal. No congestion. Eyes:      General: No scleral icterus. Conjunctiva/sclera: Conjunctivae normal.   Neck:      Musculoskeletal: Normal range of motion and neck supple. Cardiovascular:      Rate and Rhythm: Normal rate and regular rhythm. Pulses: Normal pulses. Heart sounds: Normal heart sounds. Pulmonary:      Effort: Pulmonary effort is normal. No respiratory distress. Breath sounds: Normal breath sounds. Abdominal:      Palpations: Abdomen is soft. Tenderness: There is no abdominal tenderness. Musculoskeletal: Normal range of motion. General: No tenderness. Neurological:      Mental Status: She is alert and oriented to person, place, and time. Cranial Nerves: No cranial nerve deficit. Psychiatric:         Mood and Affect: Mood normal.         Behavior: Behavior normal.           BP Readings from Last 3 Encounters:   01/05/21 136/80   06/30/20 108/80   02/28/20 102/70     /80 (Site: Right Upper Arm, Position: Sitting, Cuff Size: Large Adult)   Pulse 84   Temp 96.9 °F (36.1 °C) (Infrared)   Resp 16   Ht 5' 5\" (1.651 m)   Wt 255 lb (115.7 kg)   BMI 42.43 kg/m²   Lab Results   Component Value Date    WBC 10.6 12/27/2018    HGB 14.3 11/04/2019    HCT 43.1 11/04/2019     12/27/2018    CHOL 187 09/17/2020    TRIG 81 09/17/2020    HDL 39 (L) 09/17/2020    ALT 15 11/04/2019    AST 19 12/27/2018     09/17/2020    K 4.2 09/17/2020     (H) 09/17/2020    CREATININE 0.85 09/17/2020    BUN 8 09/17/2020    CO2 22 09/17/2020    TSH 1.37 11/04/2019    INR 1.0 07/24/2015    LABA1C 5.3 10/28/2019     Lab Results   Component Value Date    CALCIUM 8.4 (L) 09/17/2020     Lab Results   Component Value Date    LDLCHOLESTEROL 132 (H) 09/17/2020         1. Allergic contact dermatitis, unspecified trigger  - hydrOXYzine (ATARAX) 25 MG tablet; Take 1 tablet by mouth every 8 hours as needed for Itching  Dispense: 30 tablet; Refill: 0  - predniSONE (DELTASONE) 10 MG tablet; Take 3 tablets together daily for 3 days, then 2 tablets daily for 3 days, then 1 tablet daily for 3 days. Dispense: 18 tablet; Refill: 0  - Urticaria Panel; Future  - consider derm referral if rash is not improving. 2. Vitamin D deficiency  - cont weekly supplement  - vitamin D (ERGOCALCIFEROL) 1.25 MG (29305 UT) CAPS capsule; Take 1 capsule by mouth once a week  Dispense: 12 capsule; Refill: 1    3. Migraine without status migrainosus, not intractable, unspecified migraine type  - stable. Cont maxalt and topamax   - topiramate (TOPAMAX) 50 MG tablet; TAKE 1 TABLET BY MOUTH ONCE DAILY  Dispense: 90 tablet;  Refill: 1    Requested Prescriptions Signed Prescriptions Disp Refills    vitamin D (ERGOCALCIFEROL) 1.25 MG (78648 UT) CAPS capsule 12 capsule 1     Sig: Take 1 capsule by mouth once a week    topiramate (TOPAMAX) 50 MG tablet 90 tablet 1     Sig: TAKE 1 TABLET BY MOUTH ONCE DAILY    hydrOXYzine (ATARAX) 25 MG tablet 30 tablet 0     Sig: Take 1 tablet by mouth every 8 hours as needed for Itching    predniSONE (DELTASONE) 10 MG tablet 18 tablet 0     Sig: Take 3 tablets together daily for 3 days, then 2 tablets daily for 3 days, then 1 tablet daily for 3 days. Medications Discontinued During This Encounter   Medication Reason    Multiple Vitamins-Minerals (THERAPEUTIC MULTIVITAMIN-MINERALS) tablet Therapy completed    albuterol sulfate HFA (PROAIR HFA) 108 (90 Base) MCG/ACT inhaler Therapy completed    topiramate (TOPAMAX) 50 MG tablet REORDER    vitamin D (ERGOCALCIFEROL) 1.25 MG (75904 UT) CAPS capsule REORDER     Discussed use, benefit, and side effects of prescribed medications. Barriers to medication compliance addressed. All patient questions answered. Pt voiced understanding. No follow-ups on file. An electronic signature was used to authenticate this note.

## 2021-01-17 DIAGNOSIS — G43.909 MIGRAINE WITHOUT STATUS MIGRAINOSUS, NOT INTRACTABLE, UNSPECIFIED MIGRAINE TYPE: ICD-10-CM

## 2021-01-18 RX ORDER — RIZATRIPTAN BENZOATE 10 MG/1
10 TABLET ORAL
Qty: 9 TABLET | Refills: 2 | Status: SHIPPED | OUTPATIENT
Start: 2021-01-18 | End: 2021-06-20 | Stop reason: SDUPTHER

## 2021-01-19 ENCOUNTER — TELEPHONE (OUTPATIENT)
Dept: FAMILY MEDICINE CLINIC | Age: 41
End: 2021-01-19

## 2021-01-19 DIAGNOSIS — L23.9 ALLERGIC CONTACT DERMATITIS, UNSPECIFIED TRIGGER: Primary | ICD-10-CM

## 2021-01-19 DIAGNOSIS — L23.9 ALLERGIC CONTACT DERMATITIS, UNSPECIFIED TRIGGER: ICD-10-CM

## 2021-01-19 RX ORDER — HYDROXYZINE HYDROCHLORIDE 25 MG/1
25 TABLET, FILM COATED ORAL EVERY 8 HOURS PRN
Qty: 30 TABLET | Refills: 0 | Status: SHIPPED | OUTPATIENT
Start: 2021-01-19 | End: 2021-01-29

## 2021-01-19 NOTE — TELEPHONE ENCOUNTER
----- Message from ANA Roberts CNP sent at 1/19/2021  9:40 AM EST -----  Pls refer pt to allergist for skin test

## 2021-03-04 ENCOUNTER — HOSPITAL ENCOUNTER (OUTPATIENT)
Age: 41
Setting detail: SPECIMEN
Discharge: HOME | End: 2021-03-04
Payer: COMMERCIAL

## 2021-03-04 ENCOUNTER — APPOINTMENT (OUTPATIENT)
Dept: CT IMAGING | Age: 41
End: 2021-03-04
Payer: COMMERCIAL

## 2021-03-04 ENCOUNTER — HOSPITAL ENCOUNTER (EMERGENCY)
Age: 41
Discharge: ANOTHER HOSPITAL | End: 2021-03-04
Payer: COMMERCIAL

## 2021-03-04 ENCOUNTER — HOSPITAL ENCOUNTER (EMERGENCY)
Age: 41
Discharge: HOME | End: 2021-03-05
Attending: EMERGENCY MEDICINE
Payer: COMMERCIAL

## 2021-03-04 DIAGNOSIS — R41.82 ALTERED MENTAL STATUS, UNSPECIFIED ALTERED MENTAL STATUS TYPE: Primary | ICD-10-CM

## 2021-03-04 LAB
ABSOLUTE EOS #: 0.1 K/UL (ref 0–0.4)
ABSOLUTE IMMATURE GRANULOCYTE: ABNORMAL K/UL (ref 0–0.3)
ABSOLUTE LYMPH #: 1.3 K/UL (ref 1–4.8)
ABSOLUTE MONO #: 0.7 K/UL (ref 0.1–1.3)
ACETAMINOPHEN LEVEL: <5 UG/ML (ref 10–30)
ALBUMIN SERPL-MCNC: 4.4 G/DL (ref 3.5–5.2)
ALBUMIN/GLOBULIN RATIO: ABNORMAL (ref 1–2.5)
ALP BLD-CCNC: 118 U/L (ref 35–104)
ALT SERPL-CCNC: 18 U/L (ref 5–33)
ANION GAP SERPL CALCULATED.3IONS-SCNC: 11 MMOL/L (ref 9–17)
AST SERPL-CCNC: 16 U/L
BASOPHILS # BLD: 0 % (ref 0–2)
BASOPHILS ABSOLUTE: 0 K/UL (ref 0–0.2)
BILIRUB SERPL-MCNC: 0.25 MG/DL (ref 0.3–1.2)
BUN BLDV-MCNC: 11 MG/DL (ref 6–20)
BUN/CREAT BLD: ABNORMAL (ref 9–20)
CALCIUM SERPL-MCNC: 9.1 MG/DL (ref 8.6–10.4)
CHLORIDE BLD-SCNC: 104 MMOL/L (ref 98–107)
CO2: 22 MMOL/L (ref 20–31)
CREAT SERPL-MCNC: 0.89 MG/DL (ref 0.5–0.9)
DIFFERENTIAL TYPE: ABNORMAL
EOSINOPHILS RELATIVE PERCENT: 1 % (ref 0–4)
ETHANOL PERCENT: <0.01 %
ETHANOL: <10 MG/DL
GFR AFRICAN AMERICAN: >60 ML/MIN
GFR NON-AFRICAN AMERICAN: >60 ML/MIN
GFR SERPL CREATININE-BSD FRML MDRD: ABNORMAL ML/MIN/{1.73_M2}
GFR SERPL CREATININE-BSD FRML MDRD: ABNORMAL ML/MIN/{1.73_M2}
GLUCOSE BLD-MCNC: 106 MG/DL (ref 70–99)
HCG QUALITATIVE: NEGATIVE
HCT VFR BLD CALC: 45.4 % (ref 36–46)
HEMOGLOBIN: 14.8 G/DL (ref 12–16)
IMMATURE GRANULOCYTES: ABNORMAL %
LACTIC ACID: 0.7 MMOL/L (ref 0.5–2.2)
LYMPHOCYTES # BLD: 11 % (ref 24–44)
MAGNESIUM: 1.8 MG/DL (ref 1.6–2.6)
MCH RBC QN AUTO: 27.7 PG (ref 26–34)
MCHC RBC AUTO-ENTMCNC: 32.5 G/DL (ref 31–37)
MCV RBC AUTO: 85 FL (ref 80–100)
MONOCYTES # BLD: 6 % (ref 1–7)
NRBC AUTOMATED: ABNORMAL PER 100 WBC
PDW BLD-RTO: 13.6 % (ref 11.5–14.9)
PLATELET # BLD: 287 K/UL (ref 150–450)
PLATELET ESTIMATE: ABNORMAL
PMV BLD AUTO: 8 FL (ref 6–12)
POTASSIUM SERPL-SCNC: 4 MMOL/L (ref 3.7–5.3)
RBC # BLD: 5.34 M/UL (ref 4–5.2)
RBC # BLD: ABNORMAL 10*6/UL
SALICYLATE LEVEL: <1 MG/DL (ref 3–10)
SEG NEUTROPHILS: 82 % (ref 36–66)
SEGMENTED NEUTROPHILS ABSOLUTE COUNT: 10 K/UL (ref 1.3–9.1)
SODIUM BLD-SCNC: 137 MMOL/L (ref 135–144)
TOTAL PROTEIN: 7.8 G/DL (ref 6.4–8.3)
WBC # BLD: 12.1 K/UL (ref 3.5–11)
WBC # BLD: ABNORMAL 10*3/UL

## 2021-03-04 PROCEDURE — 81001 URINALYSIS AUTO W/SCOPE: CPT

## 2021-03-04 PROCEDURE — 99284 EMERGENCY DEPT VISIT MOD MDM: CPT

## 2021-03-04 PROCEDURE — G0480 DRUG TEST DEF 1-7 CLASSES: HCPCS

## 2021-03-04 PROCEDURE — 83735 ASSAY OF MAGNESIUM: CPT

## 2021-03-04 PROCEDURE — 83605 ASSAY OF LACTIC ACID: CPT

## 2021-03-04 PROCEDURE — A0427 ALS1-EMERGENCY: HCPCS

## 2021-03-04 PROCEDURE — 80053 COMPREHEN METABOLIC PANEL: CPT

## 2021-03-04 PROCEDURE — A0425 GROUND MILEAGE: HCPCS

## 2021-03-04 PROCEDURE — G0425 INPT/ED TELECONSULT30: HCPCS | Performed by: PSYCHIATRY & NEUROLOGY

## 2021-03-04 PROCEDURE — 85025 COMPLETE CBC W/AUTO DIFF WBC: CPT

## 2021-03-04 PROCEDURE — 87086 URINE CULTURE/COLONY COUNT: CPT

## 2021-03-04 PROCEDURE — 80143 DRUG ASSAY ACETAMINOPHEN: CPT

## 2021-03-04 PROCEDURE — 2580000003 HC RX 258: Performed by: EMERGENCY MEDICINE

## 2021-03-04 PROCEDURE — 80179 DRUG ASSAY SALICYLATE: CPT

## 2021-03-04 PROCEDURE — 84146 ASSAY OF PROLACTIN: CPT

## 2021-03-04 PROCEDURE — 93005 ELECTROCARDIOGRAM TRACING: CPT | Performed by: STUDENT IN AN ORGANIZED HEALTH CARE EDUCATION/TRAINING PROGRAM

## 2021-03-04 PROCEDURE — 70450 CT HEAD/BRAIN W/O DYE: CPT

## 2021-03-04 PROCEDURE — 80307 DRUG TEST PRSMV CHEM ANLYZR: CPT

## 2021-03-04 PROCEDURE — 36415 COLL VENOUS BLD VENIPUNCTURE: CPT

## 2021-03-04 PROCEDURE — 84703 CHORIONIC GONADOTROPIN ASSAY: CPT

## 2021-03-04 RX ORDER — FAMOTIDINE 20 MG/1
20 TABLET, FILM COATED ORAL DAILY
COMMUNITY
End: 2021-11-17

## 2021-03-04 RX ORDER — 0.9 % SODIUM CHLORIDE 0.9 %
500 INTRAVENOUS SOLUTION INTRAVENOUS ONCE
Status: COMPLETED | OUTPATIENT
Start: 2021-03-04 | End: 2021-03-04

## 2021-03-04 RX ADMIN — SODIUM CHLORIDE 500 ML: 0.9 INJECTION, SOLUTION INTRAVENOUS at 21:29

## 2021-03-04 ASSESSMENT — ENCOUNTER SYMPTOMS
NAUSEA: 0
SHORTNESS OF BREATH: 0
RHINORRHEA: 0
ABDOMINAL PAIN: 0
DIARRHEA: 0
SORE THROAT: 0
VOMITING: 0
CONSTIPATION: 0

## 2021-03-04 NOTE — LETTER
[facility] ED  [address] [place] [id number]  Phone: [phone]             March 5, 2021    Patient: Jennifer Shrestha   YOB: 1980   Date of Visit: 3/4/2021       To Whom It May Concern:    Jason Friedman was seen and treated in our emergency department on 3/4/2021. She may return to work on 3/7/2021.       Sincerely,             Signature:__________________________________

## 2021-03-05 ENCOUNTER — OFFICE VISIT (OUTPATIENT)
Dept: FAMILY MEDICINE CLINIC | Age: 41
End: 2021-03-05
Payer: COMMERCIAL

## 2021-03-05 VITALS
HEIGHT: 65 IN | BODY MASS INDEX: 43.92 KG/M2 | TEMPERATURE: 98.4 F | DIASTOLIC BLOOD PRESSURE: 80 MMHG | HEART RATE: 74 BPM | RESPIRATION RATE: 14 BRPM | SYSTOLIC BLOOD PRESSURE: 118 MMHG | WEIGHT: 263.6 LBS

## 2021-03-05 VITALS
TEMPERATURE: 98.1 F | DIASTOLIC BLOOD PRESSURE: 73 MMHG | OXYGEN SATURATION: 99 % | RESPIRATION RATE: 15 BRPM | HEIGHT: 65 IN | SYSTOLIC BLOOD PRESSURE: 105 MMHG | BODY MASS INDEX: 43.32 KG/M2 | HEART RATE: 62 BPM | WEIGHT: 260 LBS

## 2021-03-05 DIAGNOSIS — R56.9 SEIZURES (HCC): ICD-10-CM

## 2021-03-05 DIAGNOSIS — R56.9 SEIZURES (HCC): Primary | ICD-10-CM

## 2021-03-05 DIAGNOSIS — R55 SYNCOPE, UNSPECIFIED SYNCOPE TYPE: ICD-10-CM

## 2021-03-05 DIAGNOSIS — Q24.9 CONGENITAL HEART DISEASE: ICD-10-CM

## 2021-03-05 DIAGNOSIS — R55 SYNCOPE, UNSPECIFIED SYNCOPE TYPE: Primary | ICD-10-CM

## 2021-03-05 LAB
EKG ATRIAL RATE: 71 BPM
EKG P AXIS: 48 DEGREES
EKG P-R INTERVAL: 188 MS
EKG Q-T INTERVAL: 412 MS
EKG QRS DURATION: 82 MS
EKG QTC CALCULATION (BAZETT): 447 MS
EKG R AXIS: 55 DEGREES
EKG T AXIS: 24 DEGREES
EKG VENTRICULAR RATE: 71 BPM
PROLACTIN: 9.28 UG/L (ref 4.79–23.3)

## 2021-03-05 PROCEDURE — G8417 CALC BMI ABV UP PARAM F/U: HCPCS | Performed by: NURSE PRACTITIONER

## 2021-03-05 PROCEDURE — G8484 FLU IMMUNIZE NO ADMIN: HCPCS | Performed by: NURSE PRACTITIONER

## 2021-03-05 PROCEDURE — 1036F TOBACCO NON-USER: CPT | Performed by: NURSE PRACTITIONER

## 2021-03-05 PROCEDURE — G8427 DOCREV CUR MEDS BY ELIG CLIN: HCPCS | Performed by: NURSE PRACTITIONER

## 2021-03-05 PROCEDURE — 99214 OFFICE O/P EST MOD 30 MIN: CPT | Performed by: NURSE PRACTITIONER

## 2021-03-05 SDOH — ECONOMIC STABILITY: TRANSPORTATION INSECURITY
IN THE PAST 12 MONTHS, HAS LACK OF TRANSPORTATION KEPT YOU FROM MEETINGS, WORK, OR FROM GETTING THINGS NEEDED FOR DAILY LIVING?: NO

## 2021-03-05 ASSESSMENT — PATIENT HEALTH QUESTIONNAIRE - PHQ9
SUM OF ALL RESPONSES TO PHQ9 QUESTIONS 1 & 2: 0
2. FEELING DOWN, DEPRESSED OR HOPELESS: NOT AT ALL
DEPRESSION UNABLE TO ASSESS: YES

## 2021-03-05 ASSESSMENT — ENCOUNTER SYMPTOMS
ABDOMINAL PAIN: 0
SHORTNESS OF BREATH: 0
NAUSEA: 0
VOMITING: 0
WHEEZING: 0

## 2021-03-05 NOTE — ED NOTES
tablet Take 20 mg by mouth daily    Historical Provider, MD kimiptan (MAXALT) 10 MG tablet Take 1 tablet by mouth once as needed for Migraine May repeat in 2 hours if needed 1/18/21 3/4/21  ANA Escobar CNP   vitamin D (ERGOCALCIFEROL) 1.25 MG (96614 UT) CAPS capsule Take 1 capsule by mouth once a week 1/5/21   ANA Escobar CNP   topiramate (TOPAMAX) 50 MG tablet TAKE 1 TABLET BY MOUTH ONCE DAILY 1/5/21   ANA Escobar - CNP      Past Medical History  [] Updated/Reviewed by MSU  [x] Historical Data Only  [] Unavailable   has a past medical history of Kidney stone, Kidney stones, and Migraine. Patient Weight: 260 lbs l  [x]   Estimated   []   Stated          VITALS          Time BP Pulse   Resp  Rate N-normal  S-shallow  D-deep Monitor SpO2 O2    LPM BGL   Temp Pain   1946 101/56 72 13 N NSR 99  96.8 T 0   1959 98/63 67 21 N NSR 99 RA      2007 91/57 77 17 N  RA      2010 69/59 73 21 N NSR 98 RA      2015 91/68 84 12 N  RA         Pupils GCS   Time R L E  4 V  5 M  6 T  15   1940 3 3 4 5 6 15                                           NIH -   record on all transports and Q15 min after tPA administration    NIHSS       Time 1940      1a. LOC - Arousal Status 0      1b. LOC - Questions 0      1c. LOC - Commands 0      2. Eye Movements (gaze) 0      3. Visual Fields 0      4. Facial Palsy 0      5a. Motor Left Arm 0      5b. Motor Right Arm 0      6a. Motor Left Leg 0      6b. Motor Right Leg 0      7. Limb Ataxia 0      8. Sensory 0      9. Language/Aphasia 0      10. Dysarthria 0      11. Neglect 0      TOTAL 0          Research:    RACE Score: 0 Time: 1938  StrokeVAN Score: negTime:1938    Time Last Known Well: 1800    Narrative:    Dispatched to possible CVA per  dispatch. Arrived to find Mirian Cowden, 36 y.o., female c/o altered mental. Report received from [employer] at patients side.  at patient side via telemedicine unit.   EMS states that her boyfriend noticed her approximately@ 1800 not being responsive and stiffing up and having twitching movements. Upon our arrival with EMS pt is alert oriented to time, person and place but is confused to situation. Pt states she doesn't know what is going on does not know what happened. Pt is tearful and wants to go home and be with family. Tried to reassure pt of situation that was happening and comfort pt. Pt has no neuro deficits upon assessment. Dr. Tiera Sweet ordered CT scan to be completed and to transport to [address] [place] [address]. IV initiated. Pt B/P in 09'Q systolic. 5.3% NS hung to help improve B/P. Report called to ER along with ETA. Patient received the following medications prior to MSU arrival: NA  Patient has the following lines prior to MSU arrival: NA  Patient has the following airway placed prior to MSU arrival: NA    Patient was transferred to cot via 2 person assist and secured with straps x3. Zoll ECG, SpO2, and NIBP applied and monitored throughout encounter. HOB maintained at 30 degrees. Patient was transferred to ambulance, cot secured in scan position. Non contrast CT scan performed. After scan cot secured in transport position. IV tPA inclusion/exclusion criteria reviewed with patient and physician. Candidate for IV tPA therapy? [] Yes   [x] No - due to the following exclusion criteria:    [] ICH   [] Taking anticoagulant -   [] Beyond last time known well window  [] At or returned to baseline   [] Marked improvement of symptoms  [x] No disabling symptoms  [x] Other - Possible seizure    Patient is being transported to [address] [place] [address]. During transport patient rests comfortably. Cabin temp maintained at 70-72 °F throughout transport. Patient was transferred to bed ER # 7 via 4 person sheet lift. . Patient care handoff completed with [facility] RN at bedside.        Imaging:  Images were obtained onboard the MSU including:  [x] CT brain without contrast - see results below:      Ct Head Wo Contrast    Result Date: 3/4/2021  EXAMINATION: CT OF THE HEAD WITHOUT CONTRAST  3/4/2021 7:27 pm TECHNIQUE: CT of the head was performed without the administration of intravenous contrast. Dose modulation, iterative reconstruction, and/or weight based adjustment of the mA/kV was utilized to reduce the radiation dose to as low as reasonably achievable. COMPARISON: June 9, 2020. HISTORY: ORDERING SYSTEM PROVIDED HISTORY: Stroke TECHNOLOGIST PROVIDED HISTORY: Stroke Is the patient pregnant?->No FINDINGS: Image quality is degraded by motion artifact. BRAIN/VENTRICLES: There is no acute intracranial hemorrhage, mass effect or midline shift. No abnormal extra-axial fluid collection. The gray-white differentiation is maintained without evidence of an acute infarct. There is no evidence of hydrocephalus. ORBITS: The visualized portion of the orbits demonstrate no acute abnormality. SINUSES: The visualized paranasal sinuses and mastoid air cells demonstrate no acute abnormality. SOFT TISSUES/SKULL:  No acute abnormality of the visualized skull or soft tissues. No acute intracranial abnormality. The findings were sent to the Radiology Results [address] at 8:12 pm on 3/4/2021to be communicated to a licensed caregiver. Physical assessment performed:    Neuro: Pt is A/O x3 but is confused to situation. Keeps stating she doesn't know what happened,wants to go home, tearful. Pt PERRLA @ 3mm Pt has no facial droop No slurred speech. No sensory deficit. Moving all extremities freely without difficulty.   Resp: lungs clear to auscultation bilaterally, normal effort  Cardiac: regular rate, no murmur, 12 lead ECG shows NSR  Muscular/skeletal/peripheral vascular: no edema, no redness or tenderness in the calves, pulses present in 4 extremities   Abd/GI/: abd flat, soft, obese, non tender, bowel sounds present x 4 quadrants   Skin: normal color, warm, dry      IV LINES   Time Size Site # Attempts Performed By 1955  # 20 gauge RAC 1 R Zunk                     Total Amount of IV Fluids Infused: 150ml    MEDS / ELECTRICAL RX   Time Therapy Dosage Route Response Performed By   2000 0.9% NS 1 L IV NA R Zunk                                               TPA Administration   Time Bolus Dose Infusion Dose   NA              [] tPA dosing double checked by:       ACUTE STROKE DYSPHAGIA SCREEN              YES NO   1 GCS less than 13?         2 Facial Asymmetry or Weakness? 3 Tongue Asymmetry or Weakness? 4 Palatal Asymmetry or Weakness? 5 Signs of aspiration during 3oz water test?*                 If answers to questions 1-4 are NO proceed to 3oz water test               *Administer 3oz of water for sequential drinks, note any throat clearing, cough, or change in vocal quality immediately after and 1 minute following the swallow. If answers to questions 1-5 are NO proceed with ordered PO meds       POC LABS      TIME NA     Test (reference range)      FSBG ()      PT (11-13.5)      INR (0.8-1.1)      Na (138-146)      K (3.5-4.9)      Cl ()      iCa (1.2-1.32)      TCO2 (24-29)      Glu ()      BUN (8.0-26)      Crea (0.6-1. 3)      Hct (38-51)      Hb (12.0-17)      AnGap 10.0-20)                Assistance:   []    Fire -     []    Police    [x]    Other EMS (See Below)    [facility] Notification:    Jessi Campos 15 -  [] [facility]  [x] [facility]  [] [facility]  [] [facility]  [] [facility] [] [facility] [] [facility] [] [facility]  [] [employer]     []    [employer]:     [x]    Cell Phone     Med Control Orders Received:   [x] No  []  Yes:     Med Control Physician (if on line medical direction received)  -        Hospital Team Alert:   []    Trauma Alert    []    STEMI Alert         []    Stroke Alert    []    RACE Alert      Description Of Valuables: necklace, bracelet, clothes  Patient Valuables:   [x]    With Patient    []    Not Received    []    ER / Lab     Call Outcome:   [x] Transport to Facility    []    Care Transferred to [facility]    []    Cancelled    []    Patient Refusal    []                           Mobile Stroke Unit    Electronically signed by Kylah Chavez RN on 3/4/21 at 8:47 PM TIMOTHY Magallanes RN  03/04/21 2125

## 2021-03-05 NOTE — ED NOTES
Pt states that she wants to go home. This RN called Job Carroll at [phone]. This is pt's boyfriend and he is coming from [facility]. Dr and pt notified. Pt A&Ox4, eupneic, GCS at 15, PWD, heart sounds strong and regular, lungs clear to auscultation, pt denies pain, denies nausea.             Lizzie Willoughby RN  03/05/21 5549 Detail Level: Zone Plan: Pt educated \\nObserve 1 month

## 2021-03-05 NOTE — ED NOTES
Pt continues to sleep in her bed at this time. No distress noted.      Bubba Barcenas RN  03/04/21 5575

## 2021-03-05 NOTE — ED PROVIDER NOTES
[facility] ED     Emergency Department     Faculty Attestation        I performed a history and physical examination of the patient and discussed management with the resident. I reviewed the residents note and agree with the documented findings and plan of care. Any areas of disagreement are noted on the chart. I was personally present for the key portions of any procedures. I have documented in the chart those procedures where I was not present during the key portions. I have reviewed the emergency nurses triage note. I agree with the chief complaint, past medical history, past surgical history, allergies, medications, social and family history as documented unless otherwise noted below. Documentation of the HPI, Physical Exam and Medical Decision Making performed by medical students or scribes is based on my personal performance of the HPI, PE and MDM. For Physician Assistant/ Nurse Practitioner cases/documentation I have have had a face to face evaluation with this patient and have completed at least one if not all key elements of the E/M (history, physical exam, and MDM). Additional findings are as noted. Pertinent Comments     Primary Care Physician: Cristian Shook MD    History: This is a 36 y.o. female who presents to the Emergency Department with complaint of activity. Patient was transported here by the stroke unit. Head CT prior to arrival was unremarkable. Patient is very tearful has no complaints right now is willing to go home. No history of seizures. Denies any pain anywhere other than \"which. No head or neck pain. No visual symptoms. No numbness, tingling, weakness. Has a history of migraine migraine today and denies any headache now. Physical:     height is 5' 5\" (1.651 m) and weight is 260 lb (117.9 kg). Her oral temperature is 98.1 °F (36.7 °C). Her blood pressure is 98/64 and her pulse is 65.  Her respiration is 16 and oxygen saturation is 96%.    36 y.o. female     Afebrile, nontoxic, normal vital signs. No acute distress but is very tearful and slightly anxious.   No gross neurologic deficits on exam.    Impression: Pseudoseizure, syncopal episode, metabolic abnormality    Plan: Lab work, reassess    EKG Interpretation    Interpreted by me    Rhythm: normal sinus   Rate: normal  Axis: normal  Ectopy: none  Conduction: normal  ST Segments: no acute change  T Waves: no acute change  Q Waves: none    Clinical Impression: Nonspecific EKG    (Please note that portions of this note were completed with a voice recognition program.  Efforts were made to edit the dictations but occasionally words are mis-transcribed.)    Liela Galvez DO  Attending Emergency Physician         Leila Galvez DO  03/04/21 2044

## 2021-03-05 NOTE — ED NOTES
Mode of arrival (squad #, walk in, police, etc) : Mobile stroke unit        Chief complaint(s): Altered mental status        Arrival Note (brief scenario, treatment PTA, etc). : Reportedly patient's boyfriend found patient in a chair and she was unresponsive. Reportedly patient became stiff and started twitching. Denies hx of seizures. C= \"Have you ever felt that you should Cut down on your drinking? \"  No  A= \"Have people Annoyed you by criticizing your drinking? \"  No  G= \"Have you ever felt bad or Guilty about your drinking? \"  No  E= \"Have you ever had a drink as an Eye-opener first thing in the morning to steady your nerves or to help a hangover? \"  No      Deferred []      Reason for deferring: N/A    *If yes to two or more: probable alcohol abuse. Wilmer Taylor RN  03/04/21 2039

## 2021-03-05 NOTE — ED NOTES
Woke pt from sleep and assessed for orientation. Pt knows where she is, oriented to self but denies knowing why she is here. Pt states that she fell asleep at home and her next memory is being at the hospital. Repeatedly asked pt to try to urinate but pt declined, stating that she does not have to urinate.  explained that we need urine for assessment of a possible infection but pt refuses to try. This writer offered to straight catheterize pt to obtain sample but pt refused. Pt c/o being very cold. Provided additional blankets and a warm water filled glove.       Vidhya Gannon, RN  03/04/21 7971

## 2021-03-05 NOTE — PROGRESS NOTES
Subjective:      Patient ID: Flor Sena is a 36 y.o. female. HPI     36year old female presents with follow up ER visit yesterday for altered mental status, syncope and seizures. states she was laying in bed laughing with her daughter and lost consciousness. She had briefly unresponsive witnessed by her boyfriend. Noted to have tonic-clonic seizure activity in the ER. Pt is unable to recall the events. Blood tests and CT head were unremrakable. EKG was normal. States she has hx of congenital heart disease and used to follow up with cardiologist.  Currently is on topamax and maxalt for migraine and they have been working well. bp is stable today. No known hx of of seizures. Review of Systems   Constitutional: Negative for chills and fever. Eyes: Negative for visual disturbance. Respiratory: Negative for shortness of breath and wheezing. Cardiovascular: Negative for chest pain and leg swelling. Gastrointestinal: Negative for abdominal pain, nausea and vomiting. Neurological: Positive for seizures and syncope. Negative for dizziness, weakness and numbness. Psychiatric/Behavioral: Negative for agitation and behavioral problems. Objective:   Physical Exam  Vitals signs and nursing note reviewed. Constitutional:       General: She is not in acute distress. Appearance: Normal appearance. She is obese. HENT:      Nose: Nose normal. No congestion. Eyes:      General: No scleral icterus. Conjunctiva/sclera: Conjunctivae normal.   Neck:      Musculoskeletal: Normal range of motion and neck supple. Cardiovascular:      Rate and Rhythm: Normal rate and regular rhythm. Pulses: Normal pulses. Heart sounds: Normal heart sounds. Pulmonary:      Effort: Pulmonary effort is normal. No respiratory distress. Breath sounds: Normal breath sounds. Abdominal:      Palpations: Abdomen is soft. Tenderness: There is no abdominal tenderness.    Musculoskeletal: Normal range of motion. General: No tenderness. Skin:     General: Skin is warm and dry. Neurological:      Mental Status: She is alert and oriented to person, place, and time. Cranial Nerves: No cranial nerve deficit. Psychiatric:         Mood and Affect: Mood normal.         Behavior: Behavior normal.         Assessment:      1. Seizures ([facility] [address]    2. Syncope, unspecified syncope type    3. Congenital heart disease            Plan:        BP Readings from Last 3 Encounters:   03/05/21 118/80   03/05/21 105/73   01/05/21 136/80     /80   Pulse 74   Temp 98.4 °F (36.9 °C)   Resp 14   Ht 5' 5\" (1.651 m)   Wt 263 lb 9.6 oz (119.6 kg)   Breastfeeding No   BMI 43.87 kg/m²   Lab Results   Component Value Date    WBC 12.1 (H) 03/04/2021    HGB 14.8 03/04/2021    HCT 45.4 03/04/2021     03/04/2021    CHOL 187 09/17/2020    TRIG 81 09/17/2020    HDL 39 (L) 09/17/2020    ALT 18 03/04/2021    AST 16 03/04/2021     03/04/2021    K 4.0 03/04/2021     03/04/2021    CREATININE 0.89 03/04/2021    BUN 11 03/04/2021    CO2 22 03/04/2021    TSH 1.37 11/04/2019    INR 1.0 07/24/2015    LABA1C 5.3 10/28/2019     Lab Results   Component Value Date    CALCIUM 9.1 03/04/2021     Lab Results   Component Value Date    LDLCHOLESTEROL 132 (H) 09/17/2020         1. Seizures ([facility] [address]  - refer pt to neurology for further evaluation and management   - work note till next Monday 3/8    2. Syncope, unspecified syncope type/ 3. Congenital heart disease  - refer pt to cardiology for further evdaluation   - pt is going to bring paperwork to fill out         Requested Prescriptions      No prescriptions requested or ordered in this encounter       There are no discontinued medications. All patient questions answered. Pt voiced understanding. Return in about 4 weeks (around 4/2/2021) for as scheduled syncope, seizure .           ANA Mckinney - CNP

## 2021-03-05 NOTE — VIRTUAL HEALTH
Consults  Patient Location:  [facility] Stroke Unit    Provider Location ([facility]): [place], [place]    This virtual visit was conducted via interactive/real-time audio/video. [facility] [place] Stroke and Vascular Neurology Consult for  [facility] Stroke Unit Stroke Alert through [address] @ 7:11pm  3/4/2021 7:49 PM  Pt Name: Mayco Tineo  MRN: [id number]  YOB: 1980  Date of evaluation: 3/4/2021  Primary Care Physician: Rhona Ibanez MD  Reason for Evaluation: Stroke Evaluation with Discussion with Ed or primary team with Telemedicine and stroke evaluation with Review of imaging and labs    Mayco Tineo is a 36 y.o. female with hx of migraine on topamax and rizatriptan. It was reported by EMS patient stiffen up around 6pm and then had a tonic clonic seizure, after the seizure she became confused. At the time I saw her in the [facility], patient is improved, but she can't recalled what happened, she keeps saying she wants to go home. Allergies  is allergic to other. Medications  Prior to Admission medications    Medication Sig Start Date End Date Taking? Authorizing Provider   rizatriptan (MAXALT) 10 MG tablet Take 1 tablet by mouth once as needed for Migraine May repeat in 2 hours if needed 1/18/21 1/18/21  ANA Barbour CNP   vitamin D (ERGOCALCIFEROL) 1.25 MG (58489 UT) CAPS capsule Take 1 capsule by mouth once a week 1/5/21   ANA Barbour CNP   topiramate (TOPAMAX) 50 MG tablet TAKE 1 TABLET BY MOUTH ONCE DAILY 1/5/21   ANA Barbour CNP    Scheduled Meds:  Continuous Infusions:  PRN Meds:.  Past Medical History   has a past medical history of Kidney stone, Kidney stones, and Migraine.   Social History  Social History     Socioeconomic History    Marital status:      Spouse name: Not on file    Number of children: Not on file    Years of education: Not on file    Highest education level: Not on file Occupational History    Not on file   Social Needs    Financial resource strain: Not hard at all   [place] insecurity     Worry: Never true     Inability: Never true   [employer] needs     Medical: Not on file     Non-medical: Not on file   Tobacco Use    Smoking status: Never Smoker    Smokeless tobacco: Never Used   Substance and Sexual Activity    Alcohol use: No    Drug use: No    Sexual activity: Not on file   Lifestyle    Physical activity     Days per week: Not on file     Minutes per session: Not on file    Stress: Not on file   Relationships    Social connections     Talks on phone: Not on file     Gets together: Not on file     Attends [employer]ist service: Not on file     Active member of club or organization: Not on file     Attends meetings of clubs or organizations: Not on file     Relationship status: Not on file    Intimate partner violence     Fear of current or ex partner: Not on file     Emotionally abused: Not on file     Physically abused: Not on file     Forced sexual activity: Not on file   Other Topics Concern    Not on file   Social History Narrative    Not on file     Family History  No family history on file. OBJECTIVE  There were no vitals taken for this visit. AAO X 2, follow all commands, not oriented to time  No drift  Face symmetrical  Sensation normal    Pre-Morbid mRS: 0    Imaging:  Images were personally reviewed with CARRIE. MARLENY used to review images including:  CT brain without contrast: normal  CTA imaging: n/a    Assessment    36year old woman with migraine, now with reported seizure like activity. Recommendations:  1. NIH 0  2. Recommend Inpatient Neurology Consult for further assessment and evaluation   3.  consider . [id number]  4.  Not a tPA candidate as this is a seizure and not a stroke 5. It is unclear if this is a real seizure, and given this is a first seizure, I would not put her on a new seizure meds, patient is already on topamax for migraine  6. Will defer to inpatient neurologist who will manage her to decide the workup needed. Discussed with Stroke team    At least 33 min of Telemedicine and time in conversation directly with ED staff and physician for the patient who is in imminent and life threatening deterioration without further treatment and evaluation. This Virtual Visit was conducted with patient's (and/or legal guardian's) consent, to provide telestroke consultation and necessary medical care.   Time spent examining patient, reviewing the images personally, reviewing the chart, perform high complexity decision making and speaking with the nursing staff regarding recommendations        Rochelle Vides MD   Stroke, Neurocritical Care And/or [address] [place] [address] Stroke [address]  Electronically signed 3/4/2021 at 7:49 PM

## 2021-03-05 NOTE — ED NOTES
Bed: 07A  Expected date: 3/4/21  Expected time: 8:11 PM  Means of arrival: GABRIEL SALDIVAR  Comments:  [facility] seizures     Salvador Ornelas [facility]  03/04/21 2019

## 2021-03-05 NOTE — ED PROVIDER NOTES
[address] ED  Emergency Department Encounter  EmergencyMedicine Resident     Pt Name:Stephanie Bowman  MRN: [id number]  [place] 1980  Date of evaluation: 3/4/21  PCP:  Triston Severino MD    [address] [place] [address]       Chief Complaint   Patient presents with    Altered Mental Status       HISTORY OF PRESENT ILLNESS  (Location/Symptom, Timing/Onset, Context/Setting, Quality, Duration, Modifying Factors, Severity.)      Katiuska Mcdonald is a 36 y.o. female who presents with her mental status. Patient's last known well was 6 PM patient was found in a chair briefly unresponsive witnessed tonic-clonic movements by significant other self resolved 20 minutes arrival patient was somewhat confused stating that she needed to go to work and that she was cold. Patient is unable to recall the events of this evening. Patient has no history of seizures. Does have a history of migraines patient denies any headaches today. Per EMS blood sugar was 140 CT imaging by mobile stroke said as well as CTA which was unremarkable. Patient mildly hypotensive systolic in the upper 34H nontachycardic. No beta-blockade on board. ivf started by ems    PAST MEDICAL / SURGICAL / SOCIAL / FAMILY HISTORY      has a past medical history of Kidney stone, Kidney stones, and Migraine. has a past surgical history that includes Hysterectomy; Cholecystectomy; Appendectomy; Endometrial ablation; and Cystocopy (06/068/2016).       Social History     Socioeconomic History    Marital status:      Spouse name: Not on file    Number of children: Not on file    Years of education: Not on file    Highest education level: Not on file   Occupational History    Not on file   Social Needs    Financial resource strain: Not hard at all   [employer] insecurity     Worry: Never true     Inability: Never true   [employer] needs     Medical: Not on file     Non-medical: Not on file   Tobacco Use    Smoking status: Never Smoker    for rash. Allergic/Immunologic: Positive for environmental allergies. Neurological: Negative for facial asymmetry, speech difficulty, weakness, numbness and headaches. Psychiatric/Behavioral: Positive for agitation and confusion. PHYSICAL EXAM   (up to 7 for level 4, 8 or more for level 5)      INITIAL VITALS:   /77   Pulse 64   Temp 98.1 °F (36.7 °C) (Oral)   Resp 13   Ht 5' 5\" (1.651 m)   Wt 260 lb (117.9 kg)   SpO2 97%   BMI 43.27 kg/m²     Physical Exam  Vitals signs and nursing note reviewed. Constitutional:       General: She is not in acute distress. Appearance: She is obese. She is not ill-appearing, toxic-appearing or diaphoretic. HENT:      Head: Normocephalic. Mouth/Throat:      Mouth: Mucous membranes are moist.   Eyes:      Extraocular Movements: Extraocular movements intact. Right eye: No nystagmus. Left eye: No nystagmus. Pupils: Pupils are equal, round, and reactive to light. Pupils are equal.   Neck:      Musculoskeletal: Normal range of motion. Cardiovascular:      Rate and Rhythm: Normal rate and regular rhythm. Heart sounds: Normal heart sounds. Pulmonary:      Effort: Pulmonary effort is normal. No respiratory distress. Breath sounds: Normal breath sounds. Musculoskeletal: Normal range of motion. Skin:     Capillary Refill: Capillary refill takes less than 2 seconds. Comments: Cool to the touch   Neurological:      Mental Status: She is alert. GCS: GCS eye subscore is 4. GCS verbal subscore is 5. GCS motor subscore is 6. Cranial Nerves: No cranial nerve deficit, dysarthria or facial asymmetry. Sensory: No sensory deficit. Motor: No weakness. Deep Tendon Reflexes: Babinski sign absent on the right side. Babinski sign absent on the left side. Psychiatric:         Mood and Affect: Mood normal.          INITIAL NIH STROKE SCALE:    Time Performed:  820pm     1a.   Level of consciousness:  0 - alert; keenly responsive  1b. Level of consciousness questions:  0 - answers both questions correctly  1c. Level of consciousness questions:  0 - performs both tasks correctly  2. Best Gaze:  0 - normal  3. Visual:  0 - no visual loss  4. Facial Palsy:  0 - normal symmetric movement  5a. Motor left arm:  0 - no drift, limb holds 90 (or 45) degrees for full 10 seconds  5b. Motor right arm:  0 - no drift, limb holds 90 (or 45) degrees for full 10 seconds  6a. Motor left le - no drift; leg holds 30 degree position for full 5 seconds  6b. Motor right le - no drift; leg holds 30 degree position for full 5 seconds  7. Limb Ataxia:  0 - absent  8. Sensory:  0 - normal; no sensory loss  9. Best Language:  0 - no aphasia, normal  10. Dysarthria:  0 - normal  11.   Extinction and Inattention:  0 - no abnormality    TOTAL:  0      DIFFERENTIAL  DIAGNOSIS     PLAN (LABS / IMAGING / EKG):  Orders Placed This Encounter   Procedures    Culture, Urine    CBC with DIFF    Comprehensive Metabolic Panel    Magnesium    Lactic Acid    PROLACTIN    HCG Qualitative, Serum    Urinalysis with Microscopic    Urine Drug Screen    Acetaminophen Level    Ethanol    Salicylate    Drug screen, tricyclic    EKG 12 Lead       MEDICATIONS ORDERED:  Orders Placed This Encounter   Medications    0.9 % sodium chloride bolus       DDX: seizure, electrolyte abnormality, hypnic jerks, substance abuse    DIAGNOSTIC RESULTS / EMERGENCY DEPARTMENT COURSE / MDM   LAB RESULTS:  Results for orders placed or performed during the hospital encounter of 21   CBC with DIFF   Result Value Ref Range    WBC 12.1 (H) 3.5 - 11.0 k/uL    RBC 5.34 (H) 4.0 - 5.2 m/uL    Hemoglobin 14.8 12.0 - 16.0 g/dL    Hematocrit 45.4 36 - 46 %    MCV 85.0 80 - 100 fL    MCH 27.7 26 - 34 pg    MCHC 32.5 31 - 37 g/dL    RDW 13.6 11.5 - 14.9 %    Platelets 303 528 - 215 k/uL    MPV 8.0 6.0 - 12.0 fL    NRBC Automated NOT REPORTED per 100 WBC Differential Type NOT REPORTED     Seg Neutrophils 82 (H) 36 - 66 %    Lymphocytes 11 (L) 24 - 44 %    Monocytes 6 1 - 7 %    Eosinophils % 1 0 - 4 %    Basophils 0 0 - 2 %    Immature Granulocytes NOT REPORTED 0 %    Segs Absolute 10.00 (H) 1.3 - 9.1 k/uL    Absolute Lymph # 1.30 1.0 - 4.8 k/uL    Absolute Mono # 0.70 0.1 - 1.3 k/uL    Absolute Eos # 0.10 0.0 - 0.4 k/uL    Basophils Absolute 0.00 0.0 - 0.2 k/uL    Absolute Immature Granulocyte NOT REPORTED 0.00 - 0.30 k/uL    WBC Morphology NOT REPORTED     RBC Morphology NOT REPORTED     Platelet Estimate NOT REPORTED    Comprehensive Metabolic Panel   Result Value Ref Range    Glucose 106 (H) 70 - 99 mg/dL    BUN 11 6 - 20 mg/dL    CREATININE 0.89 0.50 - 0.90 mg/dL    Bun/Cre Ratio NOT REPORTED 9 - 20    Calcium 9.1 8.6 - 10.4 mg/dL    Sodium 137 135 - 144 mmol/L    Potassium 4.0 3.7 - 5.3 mmol/L    Chloride 104 98 - 107 mmol/L    CO2 22 20 - 31 mmol/L    Anion Gap 11 9 - 17 mmol/L    Alkaline Phosphatase 118 (H) 35 - 104 U/L    ALT 18 5 - 33 U/L    AST 16 <32 U/L    Total Bilirubin 0.25 (L) 0.3 - 1.2 mg/dL    Total Protein 7.8 6.4 - 8.3 g/dL    Albumin 4.4 3.5 - 5.2 g/dL    Albumin/Globulin Ratio NOT REPORTED 1.0 - 2.5    GFR Non-African American >60 >60 mL/min    GFR African American >60 >60 mL/min    GFR Comment          GFR Staging NOT REPORTED    Magnesium   Result Value Ref Range    Magnesium 1.8 1.6 - 2.6 mg/dL   Lactic Acid   Result Value Ref Range    Lactic Acid 0.7 0.5 - 2.2 mmol/L   PROLACTIN   Result Value Ref Range    Prolactin 9.28 4.79 - 23.30 ug/L   HCG Qualitative, Serum   Result Value Ref Range    hCG Qual NEGATIVE NEGATIVE   Acetaminophen Level   Result Value Ref Range    Acetaminophen Level <5 (L) 10 - 30 ug/mL   Ethanol   Result Value Ref Range    Ethanol <10 <10 mg/dL    Ethanol percent <5.809 %   Salicylate   Result Value Ref Range    Salicylate Lvl <1 (L) 3 - 10 mg/dL   EKG 12 Lead   Result Value Ref Range    Ventricular Rate 71 BPM Physician who either signs or Co-signs this chart in the absence of a cardiologist.    EMERGENCY DEPARTMENT COURSE:  ED Course as of Mar 05 0018   Thu Mar 04, 2021   2029 Seen and evaluated     [BG]   2137 Resulted labs reviewed    [BG]   2234 Pt sleeping comfortably. vss    [BG]   2316 Reevaluated pt still does not know the events of tonight. Refusing urine sample to evaluate for uti    [BG]   Fri Mar 05, 2021   0015 Pt reevaluated offered admission vs discharge. She would like to go home. Asked for us to call vijaya her significant other    [BG]      ED Course User Index  [BG] Isrrael Kapoor DO         PROCEDURES:  none    CONSULTS:  None    CRITICAL CARE:  Please see attending note    FINAL IMPRESSION      1.  Altered mental status, unspecified altered mental status type          DISPOSITION / PLAN     DISPOSITION  dc home with significant other and pcp followup      PATIENT REFERRED TO:  Veronica [facility]z [facility]  [address] [facility] [phone]    Call today  for followup in 1-2 days    [facility]  [place] [address]  [phone]  Go to   As needed, If symptoms worsen      DISCHARGE MEDICATIONS:  New Prescriptions    No medications on file       Isrrael Kapoor DO  Emergency Medicine Resident    (Please note that portions of thisnote were completed with a voice recognition program.  Efforts were made to edit the dictations but occasionally words are mis-transcribed.)       Isrrael Kapoor DO  Resident  03/05/21 4517

## 2021-03-10 ENCOUNTER — OFFICE VISIT (OUTPATIENT)
Dept: NEUROLOGY | Age: 41
End: 2021-03-10
Payer: COMMERCIAL

## 2021-03-10 VITALS
HEART RATE: 83 BPM | BODY MASS INDEX: 43.45 KG/M2 | DIASTOLIC BLOOD PRESSURE: 81 MMHG | TEMPERATURE: 97.5 F | SYSTOLIC BLOOD PRESSURE: 128 MMHG | HEIGHT: 65 IN | WEIGHT: 260.8 LBS

## 2021-03-10 DIAGNOSIS — G43.009 MIGRAINE WITHOUT AURA AND WITHOUT STATUS MIGRAINOSUS, NOT INTRACTABLE: ICD-10-CM

## 2021-03-10 DIAGNOSIS — Z82.49 FAMILY HISTORY OF CEREBRAL ANEURYSM: ICD-10-CM

## 2021-03-10 DIAGNOSIS — I49.9 CARDIAC ARRHYTHMIA, UNSPECIFIED CARDIAC ARRHYTHMIA TYPE: ICD-10-CM

## 2021-03-10 DIAGNOSIS — R56.9 SEIZURE (HCC): Primary | ICD-10-CM

## 2021-03-10 PROCEDURE — 1036F TOBACCO NON-USER: CPT | Performed by: PSYCHIATRY & NEUROLOGY

## 2021-03-10 PROCEDURE — G8484 FLU IMMUNIZE NO ADMIN: HCPCS | Performed by: PSYCHIATRY & NEUROLOGY

## 2021-03-10 PROCEDURE — G8417 CALC BMI ABV UP PARAM F/U: HCPCS | Performed by: PSYCHIATRY & NEUROLOGY

## 2021-03-10 PROCEDURE — G8427 DOCREV CUR MEDS BY ELIG CLIN: HCPCS | Performed by: PSYCHIATRY & NEUROLOGY

## 2021-03-10 PROCEDURE — 99204 OFFICE O/P NEW MOD 45 MIN: CPT | Performed by: PSYCHIATRY & NEUROLOGY

## 2021-03-10 RX ORDER — CETIRIZINE HYDROCHLORIDE 10 MG/1
10 TABLET ORAL 2 TIMES DAILY
COMMUNITY
Start: 2021-03-04 | End: 2022-07-25 | Stop reason: SDUPTHER

## 2021-03-10 ASSESSMENT — ENCOUNTER SYMPTOMS
RESPIRATORY NEGATIVE: 1
ALLERGIC/IMMUNOLOGIC NEGATIVE: 1
GASTROINTESTINAL NEGATIVE: 1
EYES NEGATIVE: 1

## 2021-03-10 NOTE — PROGRESS NOTES
35 yo wf with episode of unresponsiveness . She was seen on March 3 through ER having generalized tonic clonic seizure . She has migraines on topamax and maxalt . She reports that last Thursday she was laughing with boyfriend while laying down in bed turning purple becoming apneic going limp with boyfriend starting resuscitation having thereon generalized seizure for unclear duration followed thereon by confusion which resolved within 20 minutes  . There have been no other episodes of unresponsiveness in the past . She was [facility] by stroke mobile unit . There has been since episode lightheadedness on exertion . There is history of head trauma kicked by horse at age 15 with no loss of consciousness. She has history of migraines on topamax having headaches once every 2 months on 50 mg po qd. Headaches are over right temple of pressure throbbing grade 10 over 10 with nausea and vomiting attenuated with maxalt . Grandmother had cerebral aneurysm and two maternal greatuncles passed away from cerebral aneurysms  . There is no history of seizures . She has had bradycardia in the past . Testing Head CT normal      Past Medical History:   Diagnosis Date    Kidney stone 6/8/2016    Kidney stones     Migraine        Past Surgical History:   Procedure Laterality Date    APPENDECTOMY      CHOLECYSTECTOMY      CYSTOSCOPY  06/068/2016    lt ureteroscopy with holmium laser lithotripsy and lt ureteral stent    ENDOMETRIAL ABLATION      HYSTERECTOMY         History reviewed. No pertinent family history.     Social History     Socioeconomic History    Marital status:      Spouse name: None    Number of children: None    Years of education: None    Highest education level: None   Occupational History    None   Social Needs    Financial resource strain: Not hard at all   [place] insecurity     Worry: Never true     Inability: Never true   [employer] needs     Medical: No Non-medical: No   Tobacco Use    Smoking status: Never Smoker    Smokeless tobacco: Never Used   Substance and Sexual Activity    Alcohol use: No    Drug use: No    Sexual activity: None   Lifestyle    Physical activity     Days per week: None     Minutes per session: None    Stress: None   Relationships    Social connections     Talks on phone: None     Gets together: None     Attends Worship service: None     Active member of club or organization: None     Attends meetings of clubs or organizations: None     Relationship status: None    Intimate partner violence     Fear of current or ex partner: None     Emotionally abused: None     Physically abused: None     Forced sexual activity: None   Other Topics Concern    None   Social History Narrative    None       Current Outpatient Medications   Medication Sig Dispense Refill    cetirizine (ZYRTEC) 10 MG tablet TAKE 3 TABLETS BY MOUTH ONCE DAILY      famotidine (PEPCID) 20 MG tablet Take 20 mg by mouth daily      rizatriptan (MAXALT) 10 MG tablet Take 1 tablet by mouth once as needed for Migraine May repeat in 2 hours if needed 9 tablet 2    vitamin D (ERGOCALCIFEROL) 1.25 MG (20871 UT) CAPS capsule Take 1 capsule by mouth once a week 12 capsule 1    topiramate (TOPAMAX) 50 MG tablet TAKE 1 TABLET BY MOUTH ONCE DAILY 90 tablet 1     No current facility-administered medications for this visit.       Facility-Administered Medications Ordered in Other Visits   Medication Dose Route Frequency Provider Last Rate Last Admin    lactated ringers infusion   Intravenous Continuous Jethro Mercer MD   Stopped at 06/08/16 1830    fentaNYL (SUBLIMAZE) injection 25 mcg  25 mcg Intravenous Q5 Min PRN Shanice Whitt MD        morphine injection 2 mg  2 mg Intravenous Q5 Min PRN Shanice Whitt MD        labetalol (NORMODYNE;TRANDATE) injection 5 mg  5 mg Intravenous Q10 Min PRN Shanice Whitt MD        hydrALAZINE (APRESOLINE) injection 5 mg  5 mg Intravenous Q10 .Normal proprioception . Intact Vibration . Normal pinprick and light touch   Deep Tendon Reflexes normal  Plantar response flexor bilaterally      ASSESSMENT/PLAN      Diagnosis Orders   1. Seizure ([address]  EEG    Robert Parmar MD, Cardiology, [address]   2. Cardiac arrhythmia, unspecified cardiac arrhythmia type  ECHO Complete 2D W Doppler W Color    [place] - Jose Buckley MD, Cardiology, [address]   3. Migraine without aura and without status migrainosus, not intractable  MRI BRAIN WO CONTRAST   4. Family history of cerebral aneurysm  MRA HEAD WO CONTRAST   Patient had seizure while laying in bed with initial cyanosis and apnea unclear if primary or secondary event to undergo to undergo MRA and MRA with family history of cerebral aneurysm and EEG .  Will also have her undergo cardiac 2 D echo and referral to cardiologist to rule out cardiac dysrhythmia     Orders Placed This Encounter   Procedures    MRI BRAIN WO CONTRAST     Standing Status:   Future     Standing Expiration Date:   3/10/2022    MRA HEAD WO CONTRAST     Standing Status:   Future     Standing Expiration Date:   3/10/2022   Robert Parmar MD, Cardiology, [address] [facility]     Referral Priority:   Routine     Referral Type:   Eval and Treat     Referral Reason:   Specialty Services Required     Referred to Provider:   Amalia Austin MD     Requested Specialty:   Cardiology     Number of Visits Requested:   1    ECHO Complete 2D W Doppler W Color     Standing Status:   Future     Standing Expiration Date:   3/10/2022     Order Specific Question:   Reason for exam:     Answer:   syncope    EEG     Standing Status:   Future     Standing Expiration Date:   3/10/2022

## 2021-03-28 ENCOUNTER — APPOINTMENT (OUTPATIENT)
Dept: GENERAL RADIOLOGY | Age: 41
End: 2021-03-28
Payer: COMMERCIAL

## 2021-03-28 ENCOUNTER — APPOINTMENT (OUTPATIENT)
Dept: CT IMAGING | Age: 41
End: 2021-03-28
Payer: COMMERCIAL

## 2021-03-28 ENCOUNTER — HOSPITAL ENCOUNTER (EMERGENCY)
Age: 41
Discharge: HOME | End: 2021-03-28
Attending: EMERGENCY MEDICINE
Payer: COMMERCIAL

## 2021-03-28 VITALS
RESPIRATION RATE: 18 BRPM | SYSTOLIC BLOOD PRESSURE: 123 MMHG | HEART RATE: 107 BPM | OXYGEN SATURATION: 96 % | TEMPERATURE: 98.6 F | WEIGHT: 260 LBS | DIASTOLIC BLOOD PRESSURE: 79 MMHG | BODY MASS INDEX: 43.32 KG/M2 | HEIGHT: 65 IN

## 2021-03-28 DIAGNOSIS — R10.84 GENERALIZED ABDOMINAL PAIN: Primary | ICD-10-CM

## 2021-03-28 DIAGNOSIS — A08.4 VIRAL GASTROENTERITIS: ICD-10-CM

## 2021-03-28 LAB
ABSOLUTE BANDS #: 0.27 K/UL (ref 0–1)
ABSOLUTE EOS #: 0 K/UL (ref 0–0.4)
ABSOLUTE IMMATURE GRANULOCYTE: ABNORMAL K/UL (ref 0–0.3)
ABSOLUTE LYMPH #: 0.45 K/UL (ref 1–4.8)
ABSOLUTE MONO #: 0.53 K/UL (ref 0.1–1.3)
ACETAMINOPHEN LEVEL: <5 UG/ML (ref 10–30)
ALBUMIN SERPL-MCNC: 3.9 G/DL (ref 3.5–5.2)
ALBUMIN/GLOBULIN RATIO: ABNORMAL (ref 1–2.5)
ALP BLD-CCNC: 100 U/L (ref 35–104)
ALT SERPL-CCNC: 21 U/L (ref 5–33)
ANION GAP SERPL CALCULATED.3IONS-SCNC: 10 MMOL/L (ref 9–17)
AST SERPL-CCNC: 23 U/L
BANDS: 3 % (ref 0–10)
BASOPHILS # BLD: 0 % (ref 0–2)
BASOPHILS ABSOLUTE: 0 K/UL (ref 0–0.2)
BILIRUB SERPL-MCNC: 0.46 MG/DL (ref 0.3–1.2)
BUN BLDV-MCNC: 11 MG/DL (ref 6–20)
BUN/CREAT BLD: ABNORMAL (ref 9–20)
C-REACTIVE PROTEIN: 43 MG/L (ref 0–5)
CALCIUM SERPL-MCNC: 8.5 MG/DL (ref 8.6–10.4)
CHLORIDE BLD-SCNC: 107 MMOL/L (ref 98–107)
CO2: 21 MMOL/L (ref 20–31)
CREAT SERPL-MCNC: 0.85 MG/DL (ref 0.5–0.9)
DIFFERENTIAL TYPE: ABNORMAL
EOSINOPHILS RELATIVE PERCENT: 0 % (ref 0–4)
ETHANOL PERCENT: <0.01 %
ETHANOL: <10 MG/DL
GFR AFRICAN AMERICAN: >60 ML/MIN
GFR NON-AFRICAN AMERICAN: >60 ML/MIN
GFR SERPL CREATININE-BSD FRML MDRD: ABNORMAL ML/MIN/{1.73_M2}
GFR SERPL CREATININE-BSD FRML MDRD: ABNORMAL ML/MIN/{1.73_M2}
GLUCOSE BLD-MCNC: 114 MG/DL (ref 70–99)
HCT VFR BLD CALC: 42.7 % (ref 36–46)
HEMOGLOBIN: 14.3 G/DL (ref 12–16)
IMMATURE GRANULOCYTES: ABNORMAL %
LACTATE DEHYDROGENASE: 180 U/L (ref 135–214)
LACTIC ACID, WHOLE BLOOD: NORMAL MMOL/L (ref 0.7–2.1)
LACTIC ACID: 0.9 MMOL/L (ref 0.5–2.2)
LIPASE: 16 U/L (ref 13–60)
LYMPHOCYTES # BLD: 5 % (ref 24–44)
MCH RBC QN AUTO: 28.3 PG (ref 26–34)
MCHC RBC AUTO-ENTMCNC: 33.4 G/DL (ref 31–37)
MCV RBC AUTO: 84.7 FL (ref 80–100)
METAMYELOCYTES ABSOLUTE COUNT: 0.18 K/UL
METAMYELOCYTES: 2 %
MONOCYTES # BLD: 6 % (ref 1–7)
MORPHOLOGY: ABNORMAL
NRBC AUTOMATED: ABNORMAL PER 100 WBC
PDW BLD-RTO: 13.3 % (ref 11.5–14.9)
PLATELET # BLD: 256 K/UL (ref 150–450)
PLATELET ESTIMATE: ABNORMAL
PMV BLD AUTO: 7.8 FL (ref 6–12)
POTASSIUM SERPL-SCNC: 3.6 MMOL/L (ref 3.7–5.3)
RBC # BLD: 5.05 M/UL (ref 4–5.2)
RBC # BLD: ABNORMAL 10*6/UL
SALICYLATE LEVEL: <1 MG/DL (ref 3–10)
SEG NEUTROPHILS: 84 % (ref 36–66)
SEGMENTED NEUTROPHILS ABSOLUTE COUNT: 7.47 K/UL (ref 1.3–9.1)
SODIUM BLD-SCNC: 138 MMOL/L (ref 135–144)
TOTAL PROTEIN: 7 G/DL (ref 6.4–8.3)
TOXIC TRICYCLIC SC,BLOOD: ABNORMAL
TROPONIN INTERP: NORMAL
TROPONIN INTERP: NORMAL
TROPONIN T: NORMAL NG/ML
TROPONIN T: NORMAL NG/ML
TROPONIN, HIGH SENSITIVITY: <6 NG/L (ref 0–14)
TROPONIN, HIGH SENSITIVITY: <6 NG/L (ref 0–14)
WBC # BLD: 8.9 K/UL (ref 3.5–11)
WBC # BLD: ABNORMAL 10*3/UL

## 2021-03-28 PROCEDURE — 99284 EMERGENCY DEPT VISIT MOD MDM: CPT

## 2021-03-28 PROCEDURE — 85025 COMPLETE CBC W/AUTO DIFF WBC: CPT

## 2021-03-28 PROCEDURE — 74176 CT ABD & PELVIS W/O CONTRAST: CPT

## 2021-03-28 PROCEDURE — 80179 DRUG ASSAY SALICYLATE: CPT

## 2021-03-28 PROCEDURE — 96375 TX/PRO/DX INJ NEW DRUG ADDON: CPT

## 2021-03-28 PROCEDURE — 93005 ELECTROCARDIOGRAM TRACING: CPT | Performed by: EMERGENCY MEDICINE

## 2021-03-28 PROCEDURE — 86140 C-REACTIVE PROTEIN: CPT

## 2021-03-28 PROCEDURE — 36415 COLL VENOUS BLD VENIPUNCTURE: CPT

## 2021-03-28 PROCEDURE — 80307 DRUG TEST PRSMV CHEM ANLYZR: CPT

## 2021-03-28 PROCEDURE — 80143 DRUG ASSAY ACETAMINOPHEN: CPT

## 2021-03-28 PROCEDURE — 96374 THER/PROPH/DIAG INJ IV PUSH: CPT

## 2021-03-28 PROCEDURE — 80053 COMPREHEN METABOLIC PANEL: CPT

## 2021-03-28 PROCEDURE — 83605 ASSAY OF LACTIC ACID: CPT

## 2021-03-28 PROCEDURE — 71045 X-RAY EXAM CHEST 1 VIEW: CPT

## 2021-03-28 PROCEDURE — G0480 DRUG TEST DEF 1-7 CLASSES: HCPCS

## 2021-03-28 PROCEDURE — 6360000002 HC RX W HCPCS: Performed by: EMERGENCY MEDICINE

## 2021-03-28 PROCEDURE — 84484 ASSAY OF TROPONIN QUANT: CPT

## 2021-03-28 PROCEDURE — 83690 ASSAY OF LIPASE: CPT

## 2021-03-28 PROCEDURE — 83615 LACTATE (LD) (LDH) ENZYME: CPT

## 2021-03-28 PROCEDURE — 2580000003 HC RX 258: Performed by: EMERGENCY MEDICINE

## 2021-03-28 RX ORDER — KETOROLAC TROMETHAMINE 30 MG/ML
30 INJECTION, SOLUTION INTRAMUSCULAR; INTRAVENOUS ONCE
Status: COMPLETED | OUTPATIENT
Start: 2021-03-28 | End: 2021-03-28

## 2021-03-28 RX ORDER — ONDANSETRON 4 MG/1
4 TABLET, ORALLY DISINTEGRATING ORAL EVERY 8 HOURS PRN
Qty: 20 TABLET | Refills: 0 | Status: SHIPPED | OUTPATIENT
Start: 2021-03-28 | End: 2021-04-05

## 2021-03-28 RX ORDER — 0.9 % SODIUM CHLORIDE 0.9 %
1000 INTRAVENOUS SOLUTION INTRAVENOUS ONCE
Status: COMPLETED | OUTPATIENT
Start: 2021-03-28 | End: 2021-03-28

## 2021-03-28 RX ORDER — ONDANSETRON 2 MG/ML
4 INJECTION INTRAMUSCULAR; INTRAVENOUS ONCE
Status: COMPLETED | OUTPATIENT
Start: 2021-03-28 | End: 2021-03-28

## 2021-03-28 RX ADMIN — ONDANSETRON 4 MG: 2 INJECTION INTRAMUSCULAR; INTRAVENOUS at 14:14

## 2021-03-28 RX ADMIN — SODIUM CHLORIDE 1000 ML: 9 INJECTION, SOLUTION INTRAVENOUS at 14:14

## 2021-03-28 RX ADMIN — KETOROLAC TROMETHAMINE 30 MG: 30 INJECTION, SOLUTION INTRAMUSCULAR; INTRAVENOUS at 14:14

## 2021-03-28 ASSESSMENT — ENCOUNTER SYMPTOMS
DIARRHEA: 1
CONSTIPATION: 0
COUGH: 0
BLOOD IN STOOL: 0
VOMITING: 1
SHORTNESS OF BREATH: 1
ABDOMINAL PAIN: 1
TROUBLE SWALLOWING: 0
SORE THROAT: 0
BACK PAIN: 0
COLOR CHANGE: 0
NAUSEA: 1

## 2021-03-28 NOTE — ED NOTES
Pt called out stating her IV was burning. RN inspected site, there is no evidence of infiltration and blood return is still present. RN slowed the rate of the IV and asked that patient call out for any further issues.      Nuria Thompson  03/28/21 [id number]

## 2021-03-28 NOTE — ED PROVIDER NOTES
[address]  EMERGENCY DEPARTMENT ENCOUNTER    Pt Name: Vy Antonio  MRN: [id number]  YOB: 1980  Date of evaluation:3/28/21  PCP: Kostas Rogers MD    CHIEF COMPLAINT       Chief Complaint   Patient presents with    Abdominal Pain       HISTORY OF PRESENT ILLNESS    Vy Antonio is a 36 y.o. female who presents with a chief complaint of abdominal pain, flank pain, nausea, vomiting and diarrhea. Patient states her symptoms started last night. Abdominal pain is diffuse. Sharp and nonradiating. Rates it as 10 out of 10 severity. Pain started this morning. Emesis and diarrhea has been nonbloody. No other sick contacts at home currently. Also complained of some mild chest pain and difficulty breathing. She states she was here with a seizure earlier this month as well as \"my heart stopped. \"  Symptoms currently are acute. Symptoms are severe. Nothing make symptoms better or worse. Patient has no other complaints at this time. REVIEW OF SYSTEMS       Review of Systems   Constitutional: Negative for chills, fatigue and fever. HENT: Negative for congestion, ear pain, sore throat and trouble swallowing. Eyes: Negative for visual disturbance. Respiratory: Positive for shortness of breath. Negative for cough. Cardiovascular: Positive for chest pain. Negative for palpitations and leg swelling. Gastrointestinal: Positive for abdominal pain, diarrhea, nausea and vomiting. Negative for blood in stool and constipation. Genitourinary: Negative for dysuria and flank pain. Musculoskeletal: Negative for arthralgias, back pain, myalgias and neck pain. Skin: Negative for color change, rash and wound. Neurological: Negative for dizziness, weakness, light-headedness, numbness and headaches. Psychiatric/Behavioral: Negative for confusion. All other systems reviewed and are negative. Negative in 10 essential Systems except as mentioned above and in the HPI.         PAST MEDICAL HISTORY     Past Medical History:   Diagnosis Date    Kidney stone 6/8/2016    Kidney stones     Migraine          SURGICAL HISTORY      has a past surgical history that includes Hysterectomy; Cholecystectomy; Appendectomy; Endometrial ablation; and Cystocopy (06/068/2016). CURRENT MEDICATIONS       Discharge Medication List as of 3/28/2021  5:41 PM      CONTINUE these medications which have NOT CHANGED    Details   cetirizine (ZYRTEC) 10 MG tablet TAKE 3 TABLETS BY MOUTH ONCE DAILYHistorical Med      famotidine (PEPCID) 20 MG tablet Take 20 mg by mouth dailyHistorical Med      rizatriptan (MAXALT) 10 MG tablet Take 1 tablet by mouth once as needed for Migraine May repeat in 2 hours if needed, Disp-9 tablet, R-2Normal      vitamin D (ERGOCALCIFEROL) 1.25 MG (52621 UT) CAPS capsule Take 1 capsule by mouth once a week, Disp-12 capsule, R-1Normal      topiramate (TOPAMAX) 50 MG tablet TAKE 1 TABLET BY MOUTH ONCE DAILY, Disp-90 tablet, R-1Normal             ALLERGIES     is allergic to other. FAMILY HISTORY     has no family status information on file. family history is not on file. SOCIAL HISTORY      reports that she has never smoked. She has never used smokeless tobacco. She reports that she does not drink alcohol or use drugs. PHYSICAL EXAM     INITIAL VITALS:  height is 5' 5\" (1.651 m) and weight is 260 lb (117.9 kg). Her oral temperature is 98.6 °F (37 °C). Her blood pressure is 123/79 and her pulse is 107. Her respiration is 18 and oxygen saturation is 96%. Physical Exam  Vitals signs and nursing note reviewed. Constitutional:       General: She is not in acute distress. Appearance: She is ill-appearing. HENT:      Head: Normocephalic and atraumatic. Eyes:      Conjunctiva/sclera: Conjunctivae normal.      Pupils: Pupils are equal, round, and reactive to light. Neck:      Musculoskeletal: Neck supple.    Cardiovascular:      Rate and Rhythm: Normal rate and regular rhythm. Heart sounds: Normal heart sounds. No murmur. Pulmonary:      Effort: Pulmonary effort is normal. No respiratory distress. Breath sounds: Normal breath sounds. Abdominal:      General: Bowel sounds are normal. There is no distension. Palpations: Abdomen is soft. Tenderness: There is generalized abdominal tenderness. Musculoskeletal:         General: No tenderness. Lymphadenopathy:      Cervical: No cervical adenopathy. Skin:     General: Skin is warm and dry. Findings: No rash. Neurological:      Mental Status: She is alert and oriented to person, place, and time. Psychiatric:         Judgment: Judgment normal.           DIFFERENTIAL DIAGNOSIS/MDM:   30-year-old female presents with 1 day of abdominal pain, nausea and vomiting. She is afebrile, nontoxic, mildly tachycardic but otherwise vital signs normal.  No acute distress. On exam she does have some diffuse abdominal tenderness. Differential includes gastroenteritis, bowel obstruction, bowel perforation, diverticulosis, diverticulitis. We will get abdominal labs, give IV fluids, antiemetics. We will get a CT abdomen pelvis. She has had chest pain and difficulty breathing over the past several weeks as well so I am going to get a cardiac work-up. DIAGNOSTIC RESULTS     EKG: All EKG's are interpreted by the Emergency Department Physician who either signs or Co-signs this chart in the absence of a cardiologist.    EKG Interpretation    Interpreted by me    Rhythm: normal sinus   Rate: normal  Axis: normal  Ectopy: none  Conduction: normal  ST Segments: no acute change  T Waves: no acute change  Q Waves: none    Clinical Impression: Nonspecific EKG    RADIOLOGY:   I directly visualized the following  images and reviewed the radiologist interpretations:  CT ABDOMEN PELVIS WO CONTRAST Additional Contrast? None   Final Result   Colon is fluid-filled suggestive of low-grade enteritis and diarrheal   disease. DISPOSITION Decision To Discharge 03/28/2021 05:38:49 PM        PATIENT REFERRED TO:  Cristopher Moreno MD  [facility]. [address]  [address] [facility] [address]  [phone]    Schedule an appointment as soon as possible for a visit       [facility] ED  [address]  [address] [address]  [phone]    As needed, If symptoms worsen      DISCHARGE MEDICATIONS:  Discharge Medication List as of 3/28/2021  5:41 PM      START taking these medications    Details   ondansetron (ZOFRAN ODT) 4 MG disintegrating tablet Take 1 tablet by mouth every 8 hours as needed for Nausea, Disp-20 tablet, R-0Print             (Please note that portions ofthis note were completed with a voice recognition program.  Efforts were made to edit the dictations but occasionally words are mis-transcribed.)    Ann Rivera DO  Attending Emergency Physician          Ann Rivera DO  03/28/21 1808

## 2021-03-28 NOTE — LETTER
[facility]  [address] [facility] [address]  Phone: [phone]             March 28, 2021    Patient: Taylor Morales   YOB: 1980   Date of Visit: 3/28/2021       To Whom It May Concern:    Kavya John was seen and treated in our emergency department on 3/28/2021. She may return to work on 3/29/2021.       Sincerely,             Signature:__________________________________

## 2021-03-28 NOTE — ED NOTES
Mode of arrival:  car      Residence prior to admit: home      Chief complaint on admission:pt started vomiting and diarrhea last night, abd pain started today , has not vomited in lobby , skin dry , walked to triage, no signs of distress , call light in reach , covid shot yesterday         C= \"Have you ever felt that you should Cut down on your drinking? \"  No  A= \"Have people Annoyed you by criticizing your drinking? \"  No  G= \"Have you ever felt bad or Guilty about your drinking? \"  No  E= \"Have you ever had a drink as an Eye-opener first thing in the morning to steady your nerves or to help a hangover? \"  No      Deferred []      Reason for deferring: N/A    *If yes to two or more: probable alcohol abuse. Gomez Tanner RN  03/28/21 [address], RN  03/28/21 9388

## 2021-03-28 NOTE — LETTER
[facility]  [address] [facility] [address]  Phone: [phone]             March 28, 2021    Patient: Socorro Ascencio   YOB: 1980   Date of Visit: 3/28/2021       To Whom It May Concern:    Kalie Wei was seen and treated in our emergency department on 3/28/2021. She may return to work on 3/30/2021.       Sincerely,             Signature:__________________________________

## 2021-03-29 LAB
EKG ATRIAL RATE: 88 BPM
EKG P AXIS: 25 DEGREES
EKG P-R INTERVAL: 178 MS
EKG Q-T INTERVAL: 366 MS
EKG QRS DURATION: 92 MS
EKG QTC CALCULATION (BAZETT): 442 MS
EKG R AXIS: 33 DEGREES
EKG T AXIS: 34 DEGREES
EKG VENTRICULAR RATE: 88 BPM

## 2021-03-29 PROCEDURE — 93010 ELECTROCARDIOGRAM REPORT: CPT | Performed by: INTERNAL MEDICINE

## 2021-04-05 ENCOUNTER — OFFICE VISIT (OUTPATIENT)
Dept: FAMILY MEDICINE CLINIC | Age: 41
End: 2021-04-05
Payer: COMMERCIAL

## 2021-04-05 VITALS
WEIGHT: 266.8 LBS | SYSTOLIC BLOOD PRESSURE: 118 MMHG | DIASTOLIC BLOOD PRESSURE: 86 MMHG | BODY MASS INDEX: 44.45 KG/M2 | TEMPERATURE: 97.2 F | HEART RATE: 80 BPM | HEIGHT: 65 IN

## 2021-04-05 DIAGNOSIS — M25.561 ACUTE PAIN OF RIGHT KNEE: ICD-10-CM

## 2021-04-05 DIAGNOSIS — E87.6 HYPOKALEMIA: Primary | ICD-10-CM

## 2021-04-05 DIAGNOSIS — M25.561 ACUTE PAIN OF RIGHT KNEE: Primary | ICD-10-CM

## 2021-04-05 DIAGNOSIS — E83.51 HYPOCALCEMIA: ICD-10-CM

## 2021-04-05 DIAGNOSIS — K52.9 ENTERITIS: ICD-10-CM

## 2021-04-05 PROCEDURE — 1036F TOBACCO NON-USER: CPT | Performed by: NURSE PRACTITIONER

## 2021-04-05 PROCEDURE — 99214 OFFICE O/P EST MOD 30 MIN: CPT | Performed by: NURSE PRACTITIONER

## 2021-04-05 PROCEDURE — G8417 CALC BMI ABV UP PARAM F/U: HCPCS | Performed by: NURSE PRACTITIONER

## 2021-04-05 PROCEDURE — G8427 DOCREV CUR MEDS BY ELIG CLIN: HCPCS | Performed by: NURSE PRACTITIONER

## 2021-04-05 RX ORDER — MELOXICAM 7.5 MG/1
7.5 TABLET ORAL DAILY
Qty: 30 TABLET | Refills: 0 | Status: SHIPPED | OUTPATIENT
Start: 2021-04-05 | End: 2021-05-04 | Stop reason: SDUPTHER

## 2021-04-05 ASSESSMENT — ENCOUNTER SYMPTOMS
SHORTNESS OF BREATH: 0
WHEEZING: 0
VOMITING: 0
ABDOMINAL PAIN: 0
NAUSEA: 0

## 2021-04-05 NOTE — PROGRESS NOTES
Subjective:      Patient ID: Garrison Rico is a 36 y.o. female. Visit Information    Have you changed or started any medications since your last visit including any over-the-counter medicines, vitamins, or herbal medicines? no   Are you having any side effects from any of your medications? -  no  Have you stopped taking any of your medications? Is so, why? -  no    Have you seen any other physician or provider since your last visit? Yes - Records Obtained  Have you had any other diagnostic tests since your last visit? Yes - Records Obtained  Have you been seen in the emergency room and/or had an admission to a hospital since we last saw you? Yes - Records Obtained  Have you had your routine dental cleaning in the past 6 months? no    Have you activated your [employer] account? If not, what are your barriers? Yes     Patient Care Team:  Jefe James MD as PCP - General (Family Medicine)  Jefe James MD as PCP - [facility] Provider    Medical History Review  Past Medical, Family, and Social History reviewed and does contribute to the patient presenting condition    Health Maintenance   Topic Date Due    Hepatitis C screen  Never done    Varicella vaccine (1 of 2 - 2-dose childhood series) Never done    HIV screen  Never done    COVID-19 Vaccine (1) Never done    DTaP/Tdap/Td vaccine (1 - Tdap) Never done    Cervical cancer screen  Never done    Flu vaccine (Season Ended) 2022 (Originally 2021)    Diabetes screen  10/28/2022    Lipid screen  2025    Hepatitis A vaccine  Aged Out    Hepatitis B vaccine  Aged Out    Hib vaccine  Aged Out    Meningococcal (ACWY) vaccine  Aged Out    Pneumococcal 0-64 years Vaccine  Aged Out     HPI     36 y.o. female presents with management of followin. Enteritis, hypocalcemia and hypokalemia - was evaluated in ER a week ago for abdominal pain, nausea, vomiting and diarrhea and blood tests showed low calcium and potassium.  CT abd Psychiatric:         Mood and Affect: Mood normal.         Behavior: Behavior normal.         Assessment:      1. Hypokalemia    2. Hypocalcemia    3. Enteritis    4. Acute pain of right knee            Plan:      BP Readings from Last 3 Encounters:   04/05/21 118/86   03/28/21 123/79   03/10/21 128/81     /86   Pulse 80   Temp 97.2 °F (36.2 °C) (Infrared)   Ht 5' 5\" (1.651 m)   Wt 266 lb 12.8 oz (121 kg)   BMI 44.40 kg/m²   Lab Results   Component Value Date    WBC 8.9 03/28/2021    HGB 14.3 03/28/2021    HCT 42.7 03/28/2021     03/28/2021    CHOL 187 09/17/2020    TRIG 81 09/17/2020    HDL 39 (L) 09/17/2020    ALT 21 03/28/2021    AST 23 03/28/2021     03/28/2021    K 4.1 04/06/2021     03/28/2021    CREATININE 0.85 03/28/2021    BUN 11 03/28/2021    CO2 21 03/28/2021    TSH 1.37 11/04/2019    INR 1.0 07/24/2015    LABA1C 5.3 10/28/2019     Lab Results   Component Value Date    CALCIUM 8.7 04/06/2021     Lab Results   Component Value Date    LDLCHOLESTEROL 132 (H) 09/17/2020         1. Hypokalemia  - cont to monitor   - Potassium; Future    2. Hypocalcemia  - cont to monitor   - Calcium; Future    3. Enteritis  - resolving. Cont to monitor   - advised to avoid heavy greasy food and increase fluids. 4. Acute pain of right knee  - XR KNEE RIGHT (3 VIEWS); Future  - meloxicam (MOBIC) 7.5 MG tablet; Take 1 tablet by mouth daily With food  Dispense: 30 tablet; Refill: 0  - refer pt to ortho for further management       Requested Prescriptions     Signed Prescriptions Disp Refills    meloxicam (MOBIC) 7.5 MG tablet 30 tablet 0     Sig: Take 1 tablet by mouth daily With food       Medications Discontinued During This Encounter   Medication Reason    ondansetron (ZOFRAN ODT) 4 MG disintegrating tablet LIST CLEANUP       Discussed use, benefit, and side effects of prescribed medications. Barriers to medication compliance addressed. All patient questions answered.   Pt voiced

## 2021-04-06 ENCOUNTER — APPOINTMENT (OUTPATIENT)
Dept: NON INVASIVE DIAGNOSTICS | Age: 41
End: 2021-04-06
Payer: COMMERCIAL

## 2021-04-06 ENCOUNTER — APPOINTMENT (OUTPATIENT)
Dept: MRI IMAGING | Age: 41
End: 2021-04-06
Payer: COMMERCIAL

## 2021-04-06 ENCOUNTER — HOSPITAL ENCOUNTER (OUTPATIENT)
Age: 41
Discharge: HOME | End: 2021-04-06
Payer: COMMERCIAL

## 2021-04-06 ENCOUNTER — HOSPITAL ENCOUNTER (OUTPATIENT)
Dept: GENERAL RADIOLOGY | Age: 41
Discharge: HOME | End: 2021-04-08
Payer: COMMERCIAL

## 2021-04-06 ENCOUNTER — HOSPITAL ENCOUNTER (OUTPATIENT)
Dept: NEUROLOGY | Age: 41
Discharge: HOME | End: 2021-04-06
Payer: COMMERCIAL

## 2021-04-06 ENCOUNTER — HOSPITAL ENCOUNTER (OUTPATIENT)
Age: 41
Discharge: HOME | End: 2021-04-08
Payer: COMMERCIAL

## 2021-04-06 DIAGNOSIS — R56.9 SEIZURE (HCC): ICD-10-CM

## 2021-04-06 DIAGNOSIS — M25.561 ACUTE PAIN OF RIGHT KNEE: ICD-10-CM

## 2021-04-06 DIAGNOSIS — E87.6 HYPOKALEMIA: ICD-10-CM

## 2021-04-06 DIAGNOSIS — E83.51 HYPOCALCEMIA: ICD-10-CM

## 2021-04-06 LAB
CALCIUM SERPL-MCNC: 8.7 MG/DL (ref 8.6–10.4)
POTASSIUM SERPL-SCNC: 4.1 MMOL/L (ref 3.7–5.3)

## 2021-04-06 PROCEDURE — 84132 ASSAY OF SERUM POTASSIUM: CPT

## 2021-04-06 PROCEDURE — 95819 EEG AWAKE AND ASLEEP: CPT

## 2021-04-06 PROCEDURE — 73562 X-RAY EXAM OF KNEE 3: CPT

## 2021-04-06 PROCEDURE — 82310 ASSAY OF CALCIUM: CPT

## 2021-04-06 PROCEDURE — 36415 COLL VENOUS BLD VENIPUNCTURE: CPT

## 2021-04-08 DIAGNOSIS — M25.561 RIGHT KNEE PAIN, UNSPECIFIED CHRONICITY: Primary | ICD-10-CM

## 2021-04-08 PROCEDURE — 95816 EEG AWAKE AND DROWSY: CPT | Performed by: PSYCHIATRY & NEUROLOGY

## 2021-04-14 ENCOUNTER — OFFICE VISIT (OUTPATIENT)
Dept: ORTHOPEDIC SURGERY | Age: 41
End: 2021-04-14
Payer: COMMERCIAL

## 2021-04-14 VITALS
SYSTOLIC BLOOD PRESSURE: 155 MMHG | WEIGHT: 265 LBS | HEART RATE: 79 BPM | DIASTOLIC BLOOD PRESSURE: 79 MMHG | BODY MASS INDEX: 44.15 KG/M2 | HEIGHT: 65 IN | RESPIRATION RATE: 14 BRPM

## 2021-04-14 DIAGNOSIS — M17.11 ARTHRITIS OF RIGHT KNEE: ICD-10-CM

## 2021-04-14 DIAGNOSIS — M23.203 OTHER OLD TEAR OF MEDIAL MENISCUS OF RIGHT KNEE: Primary | ICD-10-CM

## 2021-04-14 PROCEDURE — 20610 DRAIN/INJ JOINT/BURSA W/O US: CPT | Performed by: ORTHOPAEDIC SURGERY

## 2021-04-14 PROCEDURE — 99244 OFF/OP CNSLTJ NEW/EST MOD 40: CPT | Performed by: ORTHOPAEDIC SURGERY

## 2021-04-14 PROCEDURE — G8427 DOCREV CUR MEDS BY ELIG CLIN: HCPCS | Performed by: ORTHOPAEDIC SURGERY

## 2021-04-14 PROCEDURE — G8417 CALC BMI ABV UP PARAM F/U: HCPCS | Performed by: ORTHOPAEDIC SURGERY

## 2021-04-14 RX ORDER — BUPIVACAINE HYDROCHLORIDE 2.5 MG/ML
2 INJECTION, SOLUTION INFILTRATION; PERINEURAL ONCE
Status: COMPLETED | OUTPATIENT
Start: 2021-04-14 | End: 2021-04-14

## 2021-04-14 RX ORDER — METHYLPREDNISOLONE ACETATE 80 MG/ML
80 INJECTION, SUSPENSION INTRA-ARTICULAR; INTRALESIONAL; INTRAMUSCULAR; SOFT TISSUE ONCE
Status: COMPLETED | OUTPATIENT
Start: 2021-04-14 | End: 2021-04-14

## 2021-04-14 RX ADMIN — BUPIVACAINE HYDROCHLORIDE 5 MG: 2.5 INJECTION, SOLUTION INFILTRATION; PERINEURAL at 16:19

## 2021-04-14 RX ADMIN — METHYLPREDNISOLONE ACETATE 80 MG: 80 INJECTION, SUSPENSION INTRA-ARTICULAR; INTRALESIONAL; INTRAMUSCULAR; SOFT TISSUE at 16:21

## 2021-04-14 ASSESSMENT — ENCOUNTER SYMPTOMS
VOMITING: 0
COUGH: 0
ABDOMINAL PAIN: 0
CHEST TIGHTNESS: 0
APNEA: 0

## 2021-04-14 NOTE — LETTER
Dr. Rucker Shall  [address] [address] [place] [id number]  Dept: [phone]  Dept Fax: [phone]        4/18/21    Patient: Lai Ortega  YOB: 1980    Dear Nikki Johnson MD,    I had the pleasure of seeing one of your patients, Emilia Anthony today in the office. Below are the relevant portions of my assessment and plan of care. IMPRESSION:  1. Other old tear of medial meniscus of right knee    2. Arthritis of right knee      PLAN:  Reviewed X-rays with the patient today in the office. Discussed etiology and natural history of possible medial meniscus tear and right knee arthritis. The treatment options may include oral anti-inflammatories, bracing, injections, advanced imaging, activity modification, physical therapy and/or surgical intervention. The patient would like to proceed with continuing mobic and corticosteroid injection to the right knee. The patient tolerates the injection well. The patient will follow up in 6 weeks. We discussed that the patient should call us with any concerns or questions. Thank you for allowing me to participate in the care of this patient. I will keep you updated on this patient's follow up and I look forward to serving you and your patients again in the future. Please don't hesitate to contact me at my mobile number [phone].         Susan Pena

## 2021-04-14 NOTE — PROGRESS NOTES
[facility]  [address] [facility]  [address] [address]  Dept: [phone]    Ambulatory Orthopedic Consult      CHIEF COMPLAINT:    Chief Complaint   Patient presents with    New Patient     Right Knee Pain       HISTORY OF PRESENT ILLNESS:      The patient is a [address] y.o[address] female who is being seen at the request of  Andra Chen MD for consultation and evaluation of  Right knee pain. The patient is in the office today for complaints or right knee pain for 2-3 months but has been worse over the last month. The patient reports no current injuries to the right knee at this time. Patient having medial sided knee pain only. The patient feels like she has a hard time walking even short distances. The patient has starting taking mobic ordered by PCP and has seen some improvement to the left knee pain. Patient reports that 5-6 years ago she had an MCL and ACL sprain after a fall down some stairs. She states that she had an MRI of the left knee completed in the past. The patient reports that she had a right knee arthroscopy in the past for bone spurs and reshaping the right knee cap.         Past Medical History:    Past Medical History:   Diagnosis Date    Kidney stone 6/8/2016    Kidney stones     Migraine        Past Surgical History:    Past Surgical History:   Procedure Laterality Date    APPENDECTOMY      CHOLECYSTECTOMY      CYSTOSCOPY  06/068/2016    lt ureteroscopy with holmium laser lithotripsy and lt ureteral stent    ENDOMETRIAL ABLATION      HYSTERECTOMY         Current Medications:   Current Outpatient Medications   Medication Sig Dispense Refill    meloxicam (MOBIC) 7.5 MG tablet Take 1 tablet by mouth daily With food 30 tablet 0    cetirizine (ZYRTEC) 10 MG tablet TAKE 3 TABLETS BY MOUTH ONCE DAILY      famotidine (PEPCID) 20 MG tablet Take 20 mg by mouth daily      rizatriptan (MAXALT) 10 MG tablet Take 1 tablet by mouth once as needed for Migraine May repeat in 2 hours if needed 9 tablet 2    vitamin D (ERGOCALCIFEROL) 1.25 MG (68092 UT) CAPS capsule Take 1 capsule by mouth once a week 12 capsule 1    topiramate (TOPAMAX) 50 MG tablet TAKE 1 TABLET BY MOUTH ONCE DAILY 90 tablet 1     No current facility-administered medications for this visit. Facility-Administered Medications Ordered in Other Visits   Medication Dose Route Frequency Provider Last Rate Last Admin    lactated ringers infusion   Intravenous Continuous Nirali Finney MD   Stopped at 06/08/16 1830    fentaNYL (SUBLIMAZE) injection 25 mcg  25 mcg Intravenous Q5 Min PRN Arielle Otero MD        morphine injection 2 mg  2 mg Intravenous Q5 Min PRN Arielle Otero MD        labetalol (NORMODYNE;TRANDATE) injection 5 mg  5 mg Intravenous Q10 Min PRN Arielle Otero MD        hydrALAZINE (APRESOLINE) injection 5 mg  5 mg Intravenous Q10 Min PRN Arielle Otero MD        meperidine (DEMEROL) injection 12.5 mg  12.5 mg Intravenous Q5 Min PRN Arielle Otero MD           Allergies:     Other    Social History:   Social History     Socioeconomic History    Marital status:      Spouse name: Not on file    Number of children: Not on file    Years of education: Not on file    Highest education level: Not on file   Occupational History    Not on file   Social Needs    Financial resource strain: Not hard at all   [email or web address]     Worry: Never true     Inability: Never true   [employer] needs     Medical: No     Non-medical: No   Tobacco Use    Smoking status: Never Smoker    Smokeless tobacco: Never Used   Substance and Sexual Activity    Alcohol use: No    Drug use: No    Sexual activity: Not on file   Lifestyle    Physical activity     Days per week: Not on file     Minutes per session: Not on file    Stress: Not on file   Relationships    Social connections     Talks on phone: Not on file     Gets together: Not on file     Attends Confucianist service: Not on file     Active member of club or organization: Not on file     Attends meetings of clubs or organizations: Not on file     Relationship status: Not on file    Intimate partner violence     Fear of current or ex partner: Not on file     Emotionally abused: Not on file     Physically abused: Not on file     Forced sexual activity: Not on file   Other Topics Concern    Not on file   Social History Narrative    Not on file       Family History:  No family history on file. REVIEW OF SYSTEMS:  Review of Systems   Constitutional: Negative for chills and fever. Respiratory: Negative for apnea, cough and chest tightness. Cardiovascular: Negative for chest pain and palpitations. Gastrointestinal: Negative for abdominal pain and vomiting. Genitourinary: Negative for difficulty urinating. Musculoskeletal: Positive for arthralgias (Right knee). Negative for gait problem, joint swelling and myalgias. Neurological: Negative for dizziness, weakness and numbness. I have reviewed the CC, HPI, ROS, PMH, FHX, Social History, and if not present in this note, I have reviewed in the patient's chart. I agree with the documentation provided by other staff and have reviewed their documentation prior to providing my signature indicating agreement. PHYSICAL EXAM:  BP (!) 155/79   Pulse 79   Resp 14   Ht 5' 5\" (1.651 m)   Wt 265 lb (120.2 kg)   BMI 44.10 kg/m²  Body mass index is 44.1 kg/m². Physical Exam  Gen: alert and oriented to person and place. Psych:  Appropriate affect; Appropriate knowledge base; Appropriate mood; No hallucinations; Head: normocephalic, atraumatic   Chest: symmetric chest excursion; nonlabored respiratory effort. Pelvis: stable; no obvious pelvis deformity  Ortho Exam  Extremity:  The patient ambulates with a mild limp. Patient is tender over medial joint line right knee. Positive varus Reynold's right knee.   No instability of the right knee is appreciated. Patient has full range of motion of the right knee. Calves are supple bilaterally. Patient has full range of motion of the right ankle and a negative hip logroll and Stinchfield test on the right is noted. Motor, sensory, vascular examination of the right lower extremity is grossly intact without focal deficits. Radiology:     Ct Abdomen Pelvis Wo Contrast Additional Contrast? None    Result Date: 3/28/2021  EXAMINATION: CT OF THE ABDOMEN AND PELVIS WITHOUT CONTRAST 3/28/2021 2:29 pm TECHNIQUE: CT of the abdomen and pelvis was performed without the administration of intravenous contrast. Multiplanar reformatted images are provided for review. Dose modulation, iterative reconstruction, and/or weight based adjustment of the mA/kV was utilized to reduce the radiation dose to as low as reasonably achievable. COMPARISON: 02/27/2010 HISTORY: ORDERING SYSTEM PROVIDED HISTORY: bilat flank pain, n/v/d TECHNOLOGIST PROVIDED HISTORY: bilat flank pain, n/v/d Decision Support Exception->Emergency Medical Condition (MA) Is the patient pregnant?->No Reason for Exam: bilat flank pain, nausea, vomitng, diarrhea Acuity: Acute Type of Exam: Initial FINDINGS: Lower Chest: Visualized lung bases are clear without focal airspace consolidation, sizeable effusion, or pneumothorax. No definite pulmonary nodules or masses. Base of the heart is normal in size without pericardial fluid collection. Organs: Status post cholecystectomy. Hepatic steatosis. No focal hepatic mass lesions. No biliary ductal dilatation. The pancreas and spleen are unremarkable. GI/Bowel: There is no evidence for bowel obstruction or inflammation. No free intraperitoneal air or fluid. The colon is fluid-filled. Status post appendectomy. Small bowel loops are normal in caliber. . Pelvis: No evidence for free fluid. Peritoneum/Retroperitoneum: Adrenal glands are normal in size and configuration.   The kidneys are normal in size without definite FINDINGS: AP portable view of the chest time stamped at 1411 hours is submitted. Heart size is normal.  No vascular congestion, focal consolidation, effusion, or pneumothorax is noted. Osseous and mediastinal structures are age-appropriate. No acute cardiopulmonary process. KNEE INJECTION PROCEDURE NOTE:  The patient was identified. The right knee was confirmed with the patient. After a sterile prep with Betadine the knee was injected using a lateral joint line approach with a mixture of 2 mL of 0.25% Marcaine and 80 mg of Depo-Medrol. Patient tolerated the procedure well without post injection complications. I instructed the patient to call our office immediately if they have any swelling or increased pain at the injection site. ASSESSMENT:     1. Other old tear of medial meniscus of right knee    2. Arthritis of right knee         PLAN:     Reviewed X-rays with the patient today in the office. Discussed etiology and natural history of possible medial meniscus tear and right knee arthritis. The treatment options may include oral anti-inflammatories, bracing, injections, advanced imaging, activity modification, physical therapy and/or surgical intervention. The patient would like to proceed with continuing mobic and corticosteroid injection to the right knee. The patient tolerates the injection well. The patient will follow up in 6 weeks. We discussed that the patient should call us with any concerns or questions. Return in about 6 weeks (around 5/26/2021).     Orders Placed This Encounter   Medications    bupivacaine (MARCAINE) 0.25 % injection 5 mg    methylPREDNISolone acetate (DEPO-MEDROL) injection 80 mg       Orders Placed This Encounter   Procedures    OR ARTHROCENTESIS ASPIR&/INJ MAJOR JT/BURSA W/O US     IMarlene LPN am scribing for and in the presence of Dr. Kusum Ashley  4/18/2021 8:15 PM    I have reviewed and made changes accordingly to the work scribed by Jarad Dave LPN. The documentation accurately reflects work and decisions made by me. I have also reviewed documentation completed by clinical staff.     Marck Bah DO, [facility]  4/18/2021 8:16 PM  This note is created with the assistance of a speech recognition program.  While intending to generate a document that actually reflects the content of the visit, the document can still have some errors including those of syntax and sound a like substitutions which may escape proof reading.  In such instances, actual meaning can be extrapolated by contextual diversion      Electronically signed by Modesta Mathew on 4/18/2021 at 8:15 PM

## 2021-04-15 ENCOUNTER — TELEPHONE (OUTPATIENT)
Dept: NEUROLOGY | Age: 41
End: 2021-04-15

## 2021-04-15 NOTE — TELEPHONE ENCOUNTER
Virgilio Must let her know  Insurance denied cardiac 2 D echo  She does have upcoming cardiology consultation who will further adress

## 2021-04-15 NOTE — TELEPHONE ENCOUNTER
Insurance denied the echocardiogram saying she must have documented murmur or signs/complaints of heart muscle problem. passing out with normal heart exam and EKG tomeka not qualify.

## 2021-04-19 ENCOUNTER — TELEPHONE (OUTPATIENT)
Dept: NEUROLOGY | Age: 41
End: 2021-04-19

## 2021-04-19 NOTE — TELEPHONE ENCOUNTER
I called Marixa Callejas. She voiced understanding. She just got a 90 day supply of the 50 mg. so she will call when she will need a new RX.

## 2021-05-04 DIAGNOSIS — M25.561 ACUTE PAIN OF RIGHT KNEE: ICD-10-CM

## 2021-05-05 ENCOUNTER — HOSPITAL ENCOUNTER (OUTPATIENT)
Dept: CARDIAC CATH/INVASIVE PROCEDURES | Age: 41
Discharge: HOME | End: 2021-05-05
Payer: COMMERCIAL

## 2021-05-05 LAB
SARS-COV-2, RAPID: NOT DETECTED
SPECIMEN DESCRIPTION: NORMAL

## 2021-05-05 PROCEDURE — 93660 TILT TABLE EVALUATION: CPT | Performed by: INTERNAL MEDICINE

## 2021-05-05 PROCEDURE — 87635 SARS-COV-2 COVID-19 AMP PRB: CPT

## 2021-05-05 RX ORDER — MELOXICAM 7.5 MG/1
7.5 TABLET ORAL DAILY
Qty: 30 TABLET | Refills: 0 | Status: SHIPPED | OUTPATIENT
Start: 2021-05-05 | End: 2021-05-26

## 2021-05-05 NOTE — PROCEDURES
[address]                  [facility]                                TILT TABLE TEST    PATIENT NAME: Mavis Deutsch                    :        1980  MED REC NO:   [id number]                             ROOM:  ACCOUNT NO:   [de-identified]                           ADMIT DATE: 2021  PROVIDER:     Bishop Watkins    Cardiovascular Diagnostics Department    PROCEDURE:  Tilt-table testing. HISTORY AND INDICATIONS:  This is a 66-year-old woman with history of  recent seizure and syncope who had spike activity found on recent EEG  and is following closely with Dr. Cheyanne Lopez with no recurrence of  seizure. She has also had periods of lightheadedness and near-syncopal  sensation suggesting a vasovagal syndrome. She has no structural heart  disease. She comes today for a tilt table evaluation. PROCEDURE:  After informed consent was obtained, the patient was brought  to the electrophysiology laboratory in the fasting, unsedated state. She was placed on the tilt table, connected to the continuous cycling  blood pressure cuff monitor and to the continuous heart rate monitor. After baseline supine measurements were obtained, the table was placed  upright to 80 degrees for a period of 20 minutes, at which point 0.4 mg  sublingual nitroglycerin was given. The table was kept upright for an  additional 4 minutes, at which point a vasovagal response was elicited  with severe lightheadedness. The table was placed supine and the  patient recovered easily. She did not lose consciousness. She felt  better as IV fluid was administered and findings were discussed in  detail with her. Instructions and prescription were given, and plans  for followup were confirmed. She was later discharged from the recovery  area in good condition. FINDINGS:  1. At 0 degrees supine, blood pressure was 134/80 mmHg, heart rate was  70 beats per minute. 2.  At 2 minutes upright, blood pressure was 124/86, heart rate was 70.  3.  At 20 minutes upright, blood pressure was 121/73, heart rate was 97.  4.  0.4 mg sublingual nitroglycerin was given at 20 minutes upright. 5.  At 23 minutes upright, blood pressure was briefly unobtainable with  heart rate dropping from 110 to 68 beats per minute. The patient was  severely lightheaded. 6.  At 24 minutes supine, blood pressure was 96/39 mmHg, heart rate was  60 beats per minute. The patient was feeling better. IMPRESSION:  Vasovagal response to upright tilt with nitroglycerin. PLAN:  1. Add Toprol 25 mg daily. 2.  Add midodrine 2.5 mg three times daily at 03:30 a.m., 08:00 a.m. and  01:00 p.m. daily. The patient was instructed into the safe use of these  medications and understands potential side effects. 3.  The importance of having at least 32 ounces of electrolyte drink  daily was detailed to the patient. 4.  The patient will follow up as scheduled, soon with Dr. Dane Jimenez. 5.  She will follow up at [facility] with the next  4-6 weeks. She knows to go to the hospital for any recurrent, severe  lightheadedness or syncope. We also discussed risks of recurrent  syncope and need to refrain from driving at this point.         Madalyn Drummond    D: 05/05/2021 11:34:44       T: 05/05/2021 11:43:06     [email or web address]  Job#: [id number]     Doc#: [id number]    CC:  MD Va Alexander

## 2021-05-14 DIAGNOSIS — G43.909 MIGRAINE WITHOUT STATUS MIGRAINOSUS, NOT INTRACTABLE, UNSPECIFIED MIGRAINE TYPE: ICD-10-CM

## 2021-05-14 RX ORDER — TOPIRAMATE 100 MG/1
100 TABLET, FILM COATED ORAL 2 TIMES DAILY
Qty: 60 TABLET | Refills: 3 | Status: SHIPPED | OUTPATIENT
Start: 2021-05-14 | End: 2021-09-02 | Stop reason: SDUPTHER

## 2021-05-14 NOTE — TELEPHONE ENCOUNTER
Dr Nuñez Canal patient. He is off the weekend    Patient is out of medication   Please sign and send       Thank you.

## 2021-05-17 ENCOUNTER — HOSPITAL ENCOUNTER (OUTPATIENT)
Dept: MRI IMAGING | Age: 41
Discharge: HOME | End: 2021-05-19
Payer: COMMERCIAL

## 2021-05-17 DIAGNOSIS — R94.01 ABNORMAL EEG: ICD-10-CM

## 2021-05-17 DIAGNOSIS — R56.9 SEIZURE (HCC): ICD-10-CM

## 2021-05-17 PROCEDURE — 70551 MRI BRAIN STEM W/O DYE: CPT

## 2021-05-17 PROCEDURE — 70544 MR ANGIOGRAPHY HEAD W/O DYE: CPT

## 2021-05-26 ENCOUNTER — OFFICE VISIT (OUTPATIENT)
Dept: ORTHOPEDIC SURGERY | Age: 41
End: 2021-05-26
Payer: COMMERCIAL

## 2021-05-26 VITALS — WEIGHT: 266 LBS | HEIGHT: 65 IN | RESPIRATION RATE: 14 BRPM | BODY MASS INDEX: 44.32 KG/M2

## 2021-05-26 DIAGNOSIS — M25.561 ACUTE PAIN OF RIGHT KNEE: ICD-10-CM

## 2021-05-26 DIAGNOSIS — M17.11 PRIMARY OSTEOARTHRITIS OF RIGHT KNEE: Primary | ICD-10-CM

## 2021-05-26 PROCEDURE — 99213 OFFICE O/P EST LOW 20 MIN: CPT | Performed by: ORTHOPAEDIC SURGERY

## 2021-05-26 PROCEDURE — G8427 DOCREV CUR MEDS BY ELIG CLIN: HCPCS | Performed by: ORTHOPAEDIC SURGERY

## 2021-05-26 PROCEDURE — 1036F TOBACCO NON-USER: CPT | Performed by: ORTHOPAEDIC SURGERY

## 2021-05-26 PROCEDURE — G8417 CALC BMI ABV UP PARAM F/U: HCPCS | Performed by: ORTHOPAEDIC SURGERY

## 2021-05-26 RX ORDER — MELOXICAM 7.5 MG/1
15 TABLET ORAL DAILY
Qty: 30 TABLET | Refills: 0 | Status: SHIPPED | OUTPATIENT
Start: 2021-05-26 | End: 2021-06-15 | Stop reason: SDUPTHER

## 2021-05-26 NOTE — PROGRESS NOTES
[facility]  [address] [place] [address]  Dept: [phone]    Orthopedic Clinic Follow Up      SUBJECTIVE: Quinn Shipley is a 36 y.o. female who presents as a follow up for right knee pain. She notes a history of an MCL and ACL sprain several years ago. She does have a history of a right knee arthroscopy with Dr. Claudeen Labella several years ago which included debridement as well as chondroplasty of the patella. At her last office visit on 4/14/2021 patient received an intra-articular right knee cortisone injection. States that the commendation of the cortisone injection as well as the Mobic took 100% of her pain away for roughly 1 month duration. She is on 7.5 mg of Mobic 1 times daily. She has not tried physical therapy. Her main complaint today is bilateral lower extremity swelling. She does have an appointment to see her cardiologist tomorrow. 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 ROS:   Constitutional: Negative for fever and chills. Respiratory: Negative for cough, shortness of breath. Cardiovascular: Negative for chest pain and palpitations. Musculoskeletal: Positive for right medial sided knee pain. Skin: Negative for itching and rash. Neurological: Negative for numbness, tingling, weakness. Psychiatric/Behavioral: Patient display good fund of knowledge, understanding of assessment and plan. OBJECTIVE:  Physical Exam:  General: NAD, sitting comfortably in the room. CV: Bilateral lower extremity dependent edema, regular rate. Pulm: Respirations unlabored and regular. Neuro: Alert. Oriented. Ortho exam:  Right knee: Skin intact. Tenderness to palpation over the medial joint line. Nontender over the MCL or pes anserine. No to minimal effusion. No pain with varus or valgus stress at 0 and 30 degrees, no increased laxity. Active and passive range of motion 0 to 130 degrees with minimal pain. Negative Lachman and posterior drawer.   Pain medially with Reynold's but no palpable click or pop. No groin pain with logroll or Stinchfield. No patellar instability. Gross motor and sensation intact. Extremity warm and well-perfused. PROCEDURES: None. RADIOLOGY:  No new radiographs are obtained in the office today. Prior radiographs on 4/14/2021 were reviewed with the patient which demonstrates moderate medial compartmental osteoarthritis as well as mild patellofemoral degenerative changes. ASSESSMENT:   1. Primary osteoarthritis of the right knee    PLAN:  Devin Maria is here for follow-up of her right knee pain. We will start her in physical therapy 2-3 times a week for roughly 6 to 8 weeks. We will begin precertification for a Visco supplementation injection, Durolane. We will also increase the dose of her Mobic medication from 7.5 mg daily to 15 mg daily with food. She was instructed to follow-up in the office in 7 weeks for reevaluation and right knee injection with her Visco supplementation.    -Return in about 7 weeks (around 7/14/2021) for recheck after PT. Orders Placed This Encounter   Medications    meloxicam (MOBIC) 7.5 MG tablet     Sig: Take 2 tablets by mouth daily With food     Dispense:  30 tablet     Refill:  0      Orders Placed This Encounter   Procedures    External Referral To Physical Therapy     Referral Priority:   Routine     Referral Type:   Eval and Treat     Referral Reason:   Specialty Services Required     Requested Specialty:   Physical Therapy     Number of Visits Requested:   1         Electronically signed by Jose Hartman DO on 5/26/2021 at 8:42 AM    This dictation was generated by voice recognition computer software. Although all attempts are made to edit the dictation for accuracy, there may be errors in the transcription that are not intended. The actual meaning should be extrapolated by the context of the sentence.

## 2021-06-01 ENCOUNTER — OFFICE VISIT (OUTPATIENT)
Dept: NEUROLOGY | Age: 41
End: 2021-06-01
Payer: COMMERCIAL

## 2021-06-01 VITALS
SYSTOLIC BLOOD PRESSURE: 130 MMHG | BODY MASS INDEX: 44.48 KG/M2 | HEIGHT: 65 IN | HEART RATE: 72 BPM | DIASTOLIC BLOOD PRESSURE: 82 MMHG | WEIGHT: 267 LBS

## 2021-06-01 DIAGNOSIS — G47.33 OBSTRUCTIVE SLEEP APNEA SYNDROME: ICD-10-CM

## 2021-06-01 DIAGNOSIS — R56.9 SEIZURE (HCC): Primary | ICD-10-CM

## 2021-06-01 DIAGNOSIS — G43.009 MIGRAINE WITHOUT AURA AND WITHOUT STATUS MIGRAINOSUS, NOT INTRACTABLE: ICD-10-CM

## 2021-06-01 PROCEDURE — 1036F TOBACCO NON-USER: CPT | Performed by: PSYCHIATRY & NEUROLOGY

## 2021-06-01 PROCEDURE — 99214 OFFICE O/P EST MOD 30 MIN: CPT | Performed by: PSYCHIATRY & NEUROLOGY

## 2021-06-01 PROCEDURE — G8427 DOCREV CUR MEDS BY ELIG CLIN: HCPCS | Performed by: PSYCHIATRY & NEUROLOGY

## 2021-06-01 PROCEDURE — G8417 CALC BMI ABV UP PARAM F/U: HCPCS | Performed by: PSYCHIATRY & NEUROLOGY

## 2021-06-01 RX ORDER — MIDODRINE HYDROCHLORIDE 2.5 MG/1
5 TABLET ORAL 3 TIMES DAILY
COMMUNITY
Start: 2021-05-05 | End: 2022-07-14

## 2021-06-01 ASSESSMENT — ENCOUNTER SYMPTOMS
RESPIRATORY NEGATIVE: 1
ALLERGIC/IMMUNOLOGIC NEGATIVE: 1
EYES NEGATIVE: 1
GASTROINTESTINAL NEGATIVE: 1

## 2021-06-01 NOTE — PROGRESS NOTES
Active problem generalized seizure to undergo to undergo MRA and MRA with family history of cerebral aneurysm and EEG . Will also have her undergo cardiac 2 D echo and referral to cardiologist to rule out cardiac dysrhythmia . The condition is  MRI of Head normal . MRA of Head normal . EEG isolated sharp wave left mid temporal lobe  . With epileptiform EEG topamax was increased to 100 mg po bid . There is daytime fatigue and sleepiness although this predates increase in dosage . There is snoring at night . She has been tested previously for sleep apnea with negative study 1 to 2 years ago. She does have obesity BMI 44 . She is going to bed at 7PM falling asleep within a few minutes sleepin to 3:15 AM do to work schedule . She has had kidney stones in the past 4 years nothing since topamax . She has chanda diagmnosed to have POTS placed on midodrine with symptoms of ligheadedness and heart racing . She has headaches twice per month over left frontal head attenuated with maxalt . She is rested on awakening getting fatigued and sleepy by 2 AM. Significant medications topamax 100 mg po bid . Testing Head CT normal . MRI of Head normal . MRA of Head normal . EEG isolated sharp wave left mid temporal lobe      Past Medical History:   Diagnosis Date    Kidney stone 6/8/2016    Kidney stones     Migraine        Past Surgical History:   Procedure Laterality Date    APPENDECTOMY      CHOLECYSTECTOMY      CYSTOSCOPY  06/068/2016    lt ureteroscopy with holmium laser lithotripsy and lt ureteral stent    ENDOMETRIAL ABLATION      HYSTERECTOMY         History reviewed. No pertinent family history.     Social History     Socioeconomic History    Marital status:      Spouse name: None    Number of children: None    Years of education: None    Highest education level: None   Occupational History    None   Tobacco Use    Smoking status: Never Smoker    Smokeless tobacco: Never Used   Vaping Use    Vaping Use: Never Ordered in Other Visits   Medication Dose Route Frequency Provider Last Rate Last Admin    lactated ringers infusion   Intravenous Continuous Sabrina Hickman MD   Stopped at 06/08/16 1830    fentaNYL (SUBLIMAZE) injection 25 mcg  25 mcg Intravenous Q5 Min PRN Michell Acosta MD        morphine injection 2 mg  2 mg Intravenous Q5 Min PRN Michell Acosta MD        labetalol (NORMODYNE;TRANDATE) injection 5 mg  5 mg Intravenous Q10 Min PRN Michell Acosta MD        hydrALAZINE (APRESOLINE) injection 5 mg  5 mg Intravenous Q10 Min PRN Michell Acosta MD        meperidine (DEMEROL) injection 12.5 mg  12.5 mg Intravenous Q5 Min PRN Michell Acosta MD           Allergies   Allergen Reactions    Other Swelling     Spider Bites         Review of Systems     Vitals:    06/01/21 1226   BP: 130/82   Pulse: 72     weight: 267 lb (121.1 kg)      Review of Systems   Constitutional: Negative. HENT: Negative. Eyes: Negative. Respiratory: Negative. Cardiovascular: Negative. Gastrointestinal: Negative. Endocrine: Negative. Genitourinary: Negative. Musculoskeletal: Negative. Skin: Negative. Allergic/Immunologic: Negative. Neurological: Negative. Hematological: Negative. Psychiatric/Behavioral: Negative. Neurological Examination  Constitutional .General exam well groomed   Head/Ears /Nose/Throat: external ear . Normal exam  Neck and thyroid . Normal size. No bruits  Respiratory . Breathsounds clear bilaterally  Cardiovascular:  Auscultation of heart with regular rate and rhythm  Musculoskeletal. Muscle bulk and tone normal                                                           Muscle strength 5/5 strength throughout                                                                               No dysmetria or dysdiadokinesis  No tremor   Normal fine motor  Gait normal   Orientation Alert and oriented x 3   Attention and concentration normal  Short term memory normal  Language process and speech

## 2021-06-14 ENCOUNTER — APPOINTMENT (OUTPATIENT)
Dept: GENERAL RADIOLOGY | Age: 41
End: 2021-06-14
Payer: COMMERCIAL

## 2021-06-14 ENCOUNTER — HOSPITAL ENCOUNTER (EMERGENCY)
Age: 41
Discharge: HOME | End: 2021-06-14
Attending: EMERGENCY MEDICINE
Payer: COMMERCIAL

## 2021-06-14 VITALS
OXYGEN SATURATION: 99 % | HEART RATE: 86 BPM | HEIGHT: 65 IN | WEIGHT: 260 LBS | DIASTOLIC BLOOD PRESSURE: 98 MMHG | SYSTOLIC BLOOD PRESSURE: 137 MMHG | TEMPERATURE: 98.1 F | RESPIRATION RATE: 18 BRPM | BODY MASS INDEX: 43.32 KG/M2

## 2021-06-14 DIAGNOSIS — R00.2 PALPITATIONS: Primary | ICD-10-CM

## 2021-06-14 LAB
-: ABNORMAL
ABSOLUTE EOS #: 0.1 K/UL (ref 0–0.4)
ABSOLUTE IMMATURE GRANULOCYTE: ABNORMAL K/UL (ref 0–0.3)
ABSOLUTE LYMPH #: 1.5 K/UL (ref 1–4.8)
ABSOLUTE MONO #: 0.5 K/UL (ref 0.1–1.3)
AMORPHOUS: ABNORMAL
ANION GAP SERPL CALCULATED.3IONS-SCNC: 8 MMOL/L (ref 9–17)
BACTERIA: ABNORMAL
BASOPHILS # BLD: 1 % (ref 0–2)
BASOPHILS ABSOLUTE: 0 K/UL (ref 0–0.2)
BILIRUBIN URINE: NEGATIVE
BUN BLDV-MCNC: 11 MG/DL (ref 6–20)
BUN/CREAT BLD: ABNORMAL (ref 9–20)
CALCIUM SERPL-MCNC: 8.7 MG/DL (ref 8.6–10.4)
CASTS UA: ABNORMAL /LPF
CHLORIDE BLD-SCNC: 108 MMOL/L (ref 98–107)
CO2: 22 MMOL/L (ref 20–31)
COLOR: YELLOW
COMMENT UA: ABNORMAL
CREAT SERPL-MCNC: 0.91 MG/DL (ref 0.5–0.9)
CRYSTALS, UA: ABNORMAL /HPF
DIFFERENTIAL TYPE: ABNORMAL
EOSINOPHILS RELATIVE PERCENT: 2 % (ref 0–4)
EPITHELIAL CELLS UA: ABNORMAL /HPF
GFR AFRICAN AMERICAN: >60 ML/MIN
GFR NON-AFRICAN AMERICAN: >60 ML/MIN
GFR SERPL CREATININE-BSD FRML MDRD: ABNORMAL ML/MIN/{1.73_M2}
GFR SERPL CREATININE-BSD FRML MDRD: ABNORMAL ML/MIN/{1.73_M2}
GLUCOSE BLD-MCNC: 89 MG/DL (ref 70–99)
GLUCOSE URINE: NEGATIVE
HCT VFR BLD CALC: 39.8 % (ref 36–46)
HEMOGLOBIN: 13.2 G/DL (ref 12–16)
IMMATURE GRANULOCYTES: ABNORMAL %
KETONES, URINE: ABNORMAL
LEUKOCYTE ESTERASE, URINE: ABNORMAL
LYMPHOCYTES # BLD: 22 % (ref 24–44)
MAGNESIUM: 1.8 MG/DL (ref 1.6–2.6)
MCH RBC QN AUTO: 28 PG (ref 26–34)
MCHC RBC AUTO-ENTMCNC: 33 G/DL (ref 31–37)
MCV RBC AUTO: 84.7 FL (ref 80–100)
MONOCYTES # BLD: 7 % (ref 1–7)
MUCUS: ABNORMAL
NITRITE, URINE: NEGATIVE
NRBC AUTOMATED: ABNORMAL PER 100 WBC
OTHER OBSERVATIONS UA: ABNORMAL
PDW BLD-RTO: 14.3 % (ref 11.5–14.9)
PH UA: 5.5 (ref 5–8)
PLATELET # BLD: 250 K/UL (ref 150–450)
PLATELET ESTIMATE: ABNORMAL
PMV BLD AUTO: 7.3 FL (ref 6–12)
POTASSIUM SERPL-SCNC: 3.5 MMOL/L (ref 3.7–5.3)
PROTEIN UA: NEGATIVE
RBC # BLD: 4.7 M/UL (ref 4–5.2)
RBC # BLD: ABNORMAL 10*6/UL
RBC UA: ABNORMAL /HPF
RENAL EPITHELIAL, UA: ABNORMAL /HPF
SEG NEUTROPHILS: 68 % (ref 36–66)
SEGMENTED NEUTROPHILS ABSOLUTE COUNT: 4.6 K/UL (ref 1.3–9.1)
SODIUM BLD-SCNC: 138 MMOL/L (ref 135–144)
SPECIFIC GRAVITY UA: 1.01 (ref 1–1.03)
TRICHOMONAS: ABNORMAL
TROPONIN INTERP: NORMAL
TROPONIN INTERP: NORMAL
TROPONIN T: NORMAL NG/ML
TROPONIN T: NORMAL NG/ML
TROPONIN, HIGH SENSITIVITY: <6 NG/L (ref 0–14)
TROPONIN, HIGH SENSITIVITY: <6 NG/L (ref 0–14)
TSH SERPL DL<=0.05 MIU/L-ACNC: 1.39 MIU/L (ref 0.3–5)
TURBIDITY: ABNORMAL
URINE HGB: NEGATIVE
UROBILINOGEN, URINE: NORMAL
WBC # BLD: 6.8 K/UL (ref 3.5–11)
WBC # BLD: ABNORMAL 10*3/UL
WBC UA: ABNORMAL /HPF
YEAST: ABNORMAL

## 2021-06-14 PROCEDURE — 2580000003 HC RX 258: Performed by: EMERGENCY MEDICINE

## 2021-06-14 PROCEDURE — 99285 EMERGENCY DEPT VISIT HI MDM: CPT

## 2021-06-14 PROCEDURE — 36415 COLL VENOUS BLD VENIPUNCTURE: CPT

## 2021-06-14 PROCEDURE — 6370000000 HC RX 637 (ALT 250 FOR IP): Performed by: EMERGENCY MEDICINE

## 2021-06-14 PROCEDURE — 85025 COMPLETE CBC W/AUTO DIFF WBC: CPT

## 2021-06-14 PROCEDURE — 80048 BASIC METABOLIC PNL TOTAL CA: CPT

## 2021-06-14 PROCEDURE — 81001 URINALYSIS AUTO W/SCOPE: CPT

## 2021-06-14 PROCEDURE — 84484 ASSAY OF TROPONIN QUANT: CPT

## 2021-06-14 PROCEDURE — 83735 ASSAY OF MAGNESIUM: CPT

## 2021-06-14 PROCEDURE — 84443 ASSAY THYROID STIM HORMONE: CPT

## 2021-06-14 PROCEDURE — 71046 X-RAY EXAM CHEST 2 VIEWS: CPT

## 2021-06-14 PROCEDURE — 93005 ELECTROCARDIOGRAM TRACING: CPT | Performed by: EMERGENCY MEDICINE

## 2021-06-14 RX ORDER — MECLIZINE HYDROCHLORIDE 25 MG/1
25 TABLET ORAL ONCE
Status: COMPLETED | OUTPATIENT
Start: 2021-06-14 | End: 2021-06-14

## 2021-06-14 RX ORDER — 0.9 % SODIUM CHLORIDE 0.9 %
1000 INTRAVENOUS SOLUTION INTRAVENOUS ONCE
Status: COMPLETED | OUTPATIENT
Start: 2021-06-14 | End: 2021-06-14

## 2021-06-14 RX ADMIN — MECLIZINE HYDROCHLORIDE 25 MG: 25 TABLET ORAL at 09:23

## 2021-06-14 RX ADMIN — SODIUM CHLORIDE 1000 ML: 9 INJECTION, SOLUTION INTRAVENOUS at 09:25

## 2021-06-14 ASSESSMENT — ENCOUNTER SYMPTOMS
ABDOMINAL PAIN: 0
VOMITING: 0
COLOR CHANGE: 0
CONSTIPATION: 0
SHORTNESS OF BREATH: 1
TROUBLE SWALLOWING: 0
BLOOD IN STOOL: 0
COUGH: 0
DIARRHEA: 0
BACK PAIN: 0
SORE THROAT: 0
NAUSEA: 0

## 2021-06-14 NOTE — ED NOTES
Mode of arrival (squad #, walk in, police, etc) : Walk-In        Chief complaint(s): Palpitations, Blood in Urine        Arrival Note (brief scenario, treatment PTA, etc). : patient arrived to ED this morning with c/o palpitations and blood in urine, Patient reports she has been noticing palpitations over the past several days. Patient reports she has history of pots and has been having difficulty getting correct medication for her symptoms. Patient reports noticing blood in urine yesterday and having difficulty urinating as well. Patient denies any fevers or issues with syncope. Patient alert and oriented x4 and respirations even non labored.                 Nanod Rosenberg RN  06/14/21 [id number]

## 2021-06-14 NOTE — ED PROVIDER NOTES
5' 5\" (1.651 m) and weight is 260 lb (117.9 kg). Her oral temperature is 98.1 °F (36.7 °C). Her blood pressure is 137/98 (abnormal) and her pulse is 86. Her respiration is 18 and oxygen saturation is 99%. Physical Exam  Vitals and nursing note reviewed. Constitutional:       General: She is not in acute distress. HENT:      Head: Normocephalic and atraumatic. Eyes:      Conjunctiva/sclera: Conjunctivae normal.      Pupils: Pupils are equal, round, and reactive to light. Cardiovascular:      Rate and Rhythm: Normal rate and regular rhythm. Heart sounds: Normal heart sounds. No murmur heard. Pulmonary:      Effort: Pulmonary effort is normal. No respiratory distress. Breath sounds: Normal breath sounds. Abdominal:      General: Bowel sounds are normal. There is no distension. Palpations: Abdomen is soft. Tenderness: There is no abdominal tenderness. Musculoskeletal:         General: No tenderness. Cervical back: Neck supple. Lymphadenopathy:      Cervical: No cervical adenopathy. Skin:     General: Skin is warm and dry. Findings: No rash. Neurological:      Mental Status: She is alert and oriented to person, place, and time. Psychiatric:         Judgment: Judgment normal.           DIFFERENTIAL DIAGNOSIS/MDM:   66-year-old female with history of POTS presents with dizziness, lightheadedness, shortness of breath and palpitations worse than usual over the past week. Currently she is afebrile, nontoxic with normal vital signs. Heart rate is normal.  Blood pressure is normal.  No gross neurologic deficits on exam.    Differential includes exacerbation of POTS, dehydration, metabolic abnormality, UTI, dysrhythmia. We will get cardiac work-up. Will check electrolytes. Will give IV fluids, meclizine, reassess. I doubt pulmonary embolism due to negative PERC criteria.     DIAGNOSTIC RESULTS     EKG: All EKG's are interpreted by the Emergency Department Physician who either signs or Co-signs this chart in the absence of a cardiologist.    EKG Interpretation    Interpreted by me    Rhythm: normal sinus   Rate: normal  Axis: normal  Ectopy: none  Conduction: normal  ST Segments: no acute change  T Waves: no acute change  Q Waves: none    Clinical Impression: no acute changes and normal EKG    RADIOLOGY:   I directly visualized the following  images and reviewed the radiologist interpretations:  XR CHEST (2 VW)   Final Result   No acute cardiopulmonary process identified. ED BEDSIDE ULTRASOUND:      LABS:  Labs Reviewed   CBC WITH AUTO DIFFERENTIAL - Abnormal; Notable for the following components:       Result Value    Seg Neutrophils 68 (*)     Lymphocytes 22 (*)     All other components within normal limits   BASIC METABOLIC PANEL - Abnormal; Notable for the following components:    CREATININE 0.91 (*)     Potassium 3.5 (*)     Chloride 108 (*)     Anion Gap 8 (*)     All other components within normal limits   URINE RT REFLEX TO CULTURE - Abnormal; Notable for the following components:    Turbidity UA CLOUDY (*)     Ketones, Urine TRACE (*)     Leukocyte Esterase, Urine TRACE (*)     All other components within normal limits   MICROSCOPIC URINALYSIS - Abnormal; Notable for the following components:    Bacteria, UA FEW (*)     All other components within normal limits   TROPONIN   TROPONIN   MAGNESIUM   TSH WITH REFLEX         EMERGENCY DEPARTMENT COURSE:   Vitals:    Vitals:    06/14/21 0855 06/14/21 1149   BP: (!) 137/98    Pulse: 86    Resp: 16 18   Temp: 98.1 °F (36.7 °C)    TempSrc: Oral    SpO2: 100% 99%   Weight: 260 lb (117.9 kg)    Height: 5' 5\" (1.651 m)      Work-up is mostly unremarkable here. Orthostatic vital signs are negative. Patient's blood pressure did not drop when she stood up. Lab work is unremarkable. 2 cardiac enzymes are negative.   Electrolytes normal.  Patient may have been mildly dehydrated from not eating or drinking

## 2021-06-15 ENCOUNTER — PATIENT MESSAGE (OUTPATIENT)
Dept: ORTHOPEDIC SURGERY | Age: 41
End: 2021-06-15

## 2021-06-15 DIAGNOSIS — M25.561 ACUTE PAIN OF RIGHT KNEE: ICD-10-CM

## 2021-06-15 LAB
EKG ATRIAL RATE: 60 BPM
EKG P AXIS: 67 DEGREES
EKG P-R INTERVAL: 176 MS
EKG Q-T INTERVAL: 428 MS
EKG QRS DURATION: 78 MS
EKG QTC CALCULATION (BAZETT): 428 MS
EKG R AXIS: 49 DEGREES
EKG T AXIS: 50 DEGREES
EKG VENTRICULAR RATE: 60 BPM

## 2021-06-15 PROCEDURE — 93010 ELECTROCARDIOGRAM REPORT: CPT | Performed by: INTERNAL MEDICINE

## 2021-06-15 RX ORDER — MELOXICAM 7.5 MG/1
15 TABLET ORAL DAILY
Qty: 30 TABLET | Refills: 0 | Status: SHIPPED | OUTPATIENT
Start: 2021-06-15 | End: 2021-06-29 | Stop reason: SDUPTHER

## 2021-06-15 NOTE — TELEPHONE ENCOUNTER
From: Scarlett Mead  To: Yue Parker DO  Sent: 6/15/2021 11:24 AM EDT  Subject: Prescription Question    I need a refill on my meloxicam I am out.

## 2021-06-29 DIAGNOSIS — M25.561 ACUTE PAIN OF RIGHT KNEE: ICD-10-CM

## 2021-06-30 RX ORDER — MELOXICAM 7.5 MG/1
15 TABLET ORAL DAILY
Qty: 30 TABLET | Refills: 0 | Status: SHIPPED | OUTPATIENT
Start: 2021-06-30 | End: 2021-07-15 | Stop reason: SDUPTHER

## 2021-07-08 ENCOUNTER — OFFICE VISIT (OUTPATIENT)
Dept: ORTHOPEDIC SURGERY | Age: 41
End: 2021-07-08
Payer: COMMERCIAL

## 2021-07-08 VITALS — HEIGHT: 65 IN | BODY MASS INDEX: 43.32 KG/M2 | WEIGHT: 260 LBS

## 2021-07-08 DIAGNOSIS — M22.41 CHONDROMALACIA PATELLAE OF RIGHT KNEE: Primary | ICD-10-CM

## 2021-07-08 PROCEDURE — G8417 CALC BMI ABV UP PARAM F/U: HCPCS | Performed by: ORTHOPAEDIC SURGERY

## 2021-07-08 PROCEDURE — 1036F TOBACCO NON-USER: CPT | Performed by: ORTHOPAEDIC SURGERY

## 2021-07-08 PROCEDURE — G8427 DOCREV CUR MEDS BY ELIG CLIN: HCPCS | Performed by: ORTHOPAEDIC SURGERY

## 2021-07-08 PROCEDURE — 99214 OFFICE O/P EST MOD 30 MIN: CPT | Performed by: ORTHOPAEDIC SURGERY

## 2021-07-08 ASSESSMENT — ENCOUNTER SYMPTOMS
COUGH: 0
DIARRHEA: 0
NAUSEA: 0
CONSTIPATION: 0

## 2021-07-08 NOTE — PROGRESS NOTES
[facility]  [facility]  [address] [address]  Dept: [phone]  Dept Fax: [phone]        Ambulatory Follow Up      Subjective:   Juan Mcgill is a 36y.o. year old female who presents to our office today for routine followup regarding her   1. Chondromalacia patellae of right knee    . Chief Complaint   Patient presents with    Follow-up     RT KNEE PAIN - GEL INJECTIONS DENIED - PT HAS NOT STARTED PHYSICAL THERAPY        HPI- Patient is here today for follow up for her right knee pain. Patient was last on 5/26/2021 for right knee MCL strain. Patient underwent treatment in the form of activity modification, aquatic therapy, her own home exercise program that consisted of riding a bike. Patient mentions that her biggest complaint is working 14-16 hours a day on her legs. history of an MCL and ACL sprain several years ago. She does have a history of a right knee arthroscopy with Dr. Livia Kinney several years ago which included debridement as well as chondroplasty of the patella. When we last saw her we wanted to perform gel injections but her insurance denied the injections. Patient continues to have pain in her right knee especially her patella. Review of Systems   Constitutional: Negative for chills and fever. Respiratory: Negative for cough. Gastrointestinal: Negative for constipation, diarrhea and nausea. Musculoskeletal: Positive for arthralgias (right knee). Negative for gait problem, joint swelling and myalgias. Neurological: Negative for dizziness, weakness and numbness. I have reviewed the CC, HPI, ROS, PMH, FHX, Social History, and if not present in this note, I have reviewed in the patient's chart. I agree with the documentation provided by other staff and have reviewed their documentation prior to providing my signature indicating agreement.     Objective :   Ht 5' 5\" (1.651 m)   Wt 260 lb (117.9 kg)   BMI 43.27 kg/m²  Body mass index is 43.27 kg/m². General: Alessandra Dorado is a 36 y.o. female who is alert and oriented and sitting comfortably in our office. Ortho Exam  MS: Patient relates a mild shortening weightbearing phase to the Right lower extremity. Mild knee effusion is noted to the Right lower extremity. There is no erythema, warmth, skin lesions, signs of infection. Positive Patellar crepitance is noted on the. Negative J sign is noted. Negative Patellar apprehension is noted. PositiveLateral patellar compression test is noted. There is no instability with varus and valgus stress applied at 0 and 30° of flexion. There is negative anterior drawer Lachman's test.  Negative Reynold's testing is appreciated. There is negative hip log roll and Stinchfield test noted. Full range of motion of the ankle is noted. Motor, sensory, vascular examination to the lower extremity is appreciated. Neuro: alert and oriented to person and place. Eyes: Extra-ocular muscles intact  Mouth: Oral mucosa moist. No perioral lesions  Pulm: Respirations unlabored and regular. Symmetric chest excursion without outward deformity is noted. Skin: warm, well perfused  Psych:   Patient has good fund of knowledge and displays understanging of exam, diagnosis, and plan. Radiology:     No x-rays taken in office today. Assessment:      1. Chondromalacia patellae of right knee       Plan:      Discussed etiology and natural history of right knee chondromalacia patella. The treatment options may include oral anti-inflammatories, bracing, injections, advanced imaging, activity modification, physical therapy and/or surgical intervention. The patient would like to proceed with formal physical therapy for at least 18 sessions, cod liver oil,and also home therapy that she has been doing . The patient will follow up in 6-8  weeks.  If she is not doing better than before we can order an MRI of her right knee to further investigate meniscal/ligament pathology. We discussed that the patient should call us with any concerns or questions. Follow up:Return in about 6 weeks (around 8/19/2021) for re- evaluation. No orders of the defined types were placed in this encounter. Orders Placed This Encounter   Procedures    External Referral To Physical Therapy     Referral Priority:   Routine     Referral Type:   Eval and Treat     Referral Reason:   Specialty Services Required     Requested Specialty:   Physical Therapy     Number of Visits Requested:   1     I, Dulce Delarosa RN am scribing for and in the presence of Dr. Najma Collins  7/8/2021 5:43 PM      I have reviewed and made changes accordingly to the work scribed by Dulce Delarosa RN. The documentation accurately reflects work and decisions made by me. I have also reviewed documentation completed by clinical staff.     Najma Collins DO, [facility]  7/9/2021 9:06 AM    This note is created with the assistance of a speech recognition program.  While intending to generate a document that actually reflects the content of the visit, the document can still have some errors including those of syntax and sound a like substitutions which may escape proof reading.  In such instances, actual meaning can be extrapolated by contextual diversion      Electronically signed by Tiffani Parsons RN, [employer] [employer] on 7/8/2021 at 5:43 PM

## 2021-07-14 ENCOUNTER — OFFICE VISIT (OUTPATIENT)
Dept: FAMILY MEDICINE CLINIC | Age: 41
End: 2021-07-14
Payer: COMMERCIAL

## 2021-07-14 VITALS
HEART RATE: 64 BPM | HEIGHT: 65 IN | WEIGHT: 255.8 LBS | SYSTOLIC BLOOD PRESSURE: 108 MMHG | BODY MASS INDEX: 42.62 KG/M2 | DIASTOLIC BLOOD PRESSURE: 78 MMHG | TEMPERATURE: 97.4 F

## 2021-07-14 DIAGNOSIS — E55.9 VITAMIN D DEFICIENCY: ICD-10-CM

## 2021-07-14 DIAGNOSIS — E78.2 MIXED HYPERLIPIDEMIA: Primary | ICD-10-CM

## 2021-07-14 DIAGNOSIS — E53.8 B12 DEFICIENCY: ICD-10-CM

## 2021-07-14 DIAGNOSIS — E87.6 HYPOKALEMIA: ICD-10-CM

## 2021-07-14 DIAGNOSIS — G90.A POTS (POSTURAL ORTHOSTATIC TACHYCARDIA SYNDROME): ICD-10-CM

## 2021-07-14 PROCEDURE — 99214 OFFICE O/P EST MOD 30 MIN: CPT | Performed by: NURSE PRACTITIONER

## 2021-07-14 PROCEDURE — G8427 DOCREV CUR MEDS BY ELIG CLIN: HCPCS | Performed by: NURSE PRACTITIONER

## 2021-07-14 PROCEDURE — 1036F TOBACCO NON-USER: CPT | Performed by: NURSE PRACTITIONER

## 2021-07-14 PROCEDURE — G8417 CALC BMI ABV UP PARAM F/U: HCPCS | Performed by: NURSE PRACTITIONER

## 2021-07-14 SDOH — ECONOMIC STABILITY: FOOD INSECURITY: WITHIN THE PAST 12 MONTHS, YOU WORRIED THAT YOUR FOOD WOULD RUN OUT BEFORE YOU GOT MONEY TO BUY MORE.: NEVER TRUE

## 2021-07-14 SDOH — ECONOMIC STABILITY: TRANSPORTATION INSECURITY
IN THE PAST 12 MONTHS, HAS THE LACK OF TRANSPORTATION KEPT YOU FROM MEDICAL APPOINTMENTS OR FROM GETTING MEDICATIONS?: NO

## 2021-07-14 SDOH — ECONOMIC STABILITY: INCOME INSECURITY: HOW HARD IS IT FOR YOU TO PAY FOR THE VERY BASICS LIKE FOOD, HOUSING, MEDICAL CARE, AND HEATING?: NOT HARD AT ALL

## 2021-07-14 SDOH — ECONOMIC STABILITY: FOOD INSECURITY: WITHIN THE PAST 12 MONTHS, THE FOOD YOU BOUGHT JUST DIDN'T LAST AND YOU DIDN'T HAVE MONEY TO GET MORE.: NEVER TRUE

## 2021-07-14 ASSESSMENT — ENCOUNTER SYMPTOMS
WHEEZING: 0
SHORTNESS OF BREATH: 0
ABDOMINAL PAIN: 0
NAUSEA: 0
VOMITING: 0

## 2021-07-14 NOTE — PROGRESS NOTES
weekly vit d supplements. Hx of migraine headache managed by Dr. Kassandra Valencia.  , who also referred her for sleep study. Review of Systems   Constitutional: Positive for fatigue. Negative for chills and fever. Eyes: Negative for visual disturbance. Respiratory: Negative for shortness of breath and wheezing. Cardiovascular: Negative for chest pain and leg swelling. Gastrointestinal: Negative for abdominal pain, nausea and vomiting. Neurological: Positive for headaches. Negative for dizziness, weakness and numbness. Psychiatric/Behavioral: Negative for agitation and behavioral problems. Objective:   Physical Exam  Vitals and nursing note reviewed. Constitutional:       General: She is not in acute distress. Appearance: Normal appearance. She is obese. HENT:      Nose: Nose normal.   Eyes:      Conjunctiva/sclera: Conjunctivae normal.   Cardiovascular:      Rate and Rhythm: Normal rate and regular rhythm. Heart sounds: Normal heart sounds. Pulmonary:      Effort: Pulmonary effort is normal. No respiratory distress. Breath sounds: Normal breath sounds. Abdominal:      Palpations: Abdomen is soft. Tenderness: There is no abdominal tenderness. Musculoskeletal:         General: Normal range of motion. Cervical back: Neck supple. Lymphadenopathy:      Cervical: No cervical adenopathy. Skin:     General: Skin is warm and dry. Neurological:      Mental Status: She is alert and oriented to person, place, and time. Cranial Nerves: No cranial nerve deficit. Psychiatric:         Mood and Affect: Mood normal.         Behavior: Behavior normal.         Assessment:      1. Mixed hyperlipidemia    2. POTS (postural orthostatic tachycardia syndrome)    3. Hypokalemia    4. B12 deficiency    5.  Vitamin D deficiency            Plan:      BP Readings from Last 3 Encounters:   07/14/21 108/78   06/14/21 (!) 137/98   06/01/21 130/82     /78   Pulse 64   Temp 97.4 °F (36.3 °C) (Infrared)   Ht 5' 5\" (1.651 m)   Wt 255 lb 12.8 oz (116 kg)   BMI 42.57 kg/m²   Lab Results   Component Value Date    WBC 6.8 06/14/2021    HGB 13.2 06/14/2021    HCT 39.8 06/14/2021     06/14/2021    CHOL 187 09/17/2020    TRIG 81 09/17/2020    HDL 39 (L) 09/17/2020    ALT 21 03/28/2021    AST 23 03/28/2021     06/14/2021    K 3.5 (L) 06/14/2021     (H) 06/14/2021    CREATININE 0.91 (H) 06/14/2021    BUN 11 06/14/2021    CO2 22 06/14/2021    TSH 1.39 06/14/2021    INR 1.0 07/24/2015    LABA1C 5.3 10/28/2019     Lab Results   Component Value Date    CALCIUM 8.7 06/14/2021     Lab Results   Component Value Date    LDLCHOLESTEROL 132 (H) 09/17/2020         1. Mixed hyperlipidemia  - cont diet measures   - Lipid Panel; Future  - ALT; Future    2. POTS (postural orthostatic tachycardia syndrome)  - keep hydration and salt intake   - follow up with cardiologist as scheduled     3. Hypokalemia  - Potassium; Future    4. B12 deficiency  - Vitamin B12; Future  - consider b12 injection or supplements pending lab results    5. Vitamin D deficiency  - Vitamin D 25 Hydroxy; Future  - cont weekly supplements       Requested Prescriptions      No prescriptions requested or ordered in this encounter       There are no discontinued medications. Discussed use, benefit, and side effects of prescribed medications. Barriers to medication compliance addressed. All patient questions answered. Pt voiced understanding. No follow-ups on file.

## 2021-07-15 DIAGNOSIS — M25.561 ACUTE PAIN OF RIGHT KNEE: ICD-10-CM

## 2021-07-19 RX ORDER — MELOXICAM 7.5 MG/1
15 TABLET ORAL DAILY
Qty: 30 TABLET | Refills: 0 | Status: SHIPPED | OUTPATIENT
Start: 2021-07-19 | End: 2021-08-03 | Stop reason: SDUPTHER

## 2021-08-02 ENCOUNTER — TELEPHONE (OUTPATIENT)
Dept: ORTHOPEDIC SURGERY | Age: 41
End: 2021-08-02

## 2021-08-03 DIAGNOSIS — M25.561 ACUTE PAIN OF RIGHT KNEE: ICD-10-CM

## 2021-08-04 RX ORDER — MELOXICAM 7.5 MG/1
15 TABLET ORAL DAILY
Qty: 30 TABLET | Refills: 0 | Status: SHIPPED | OUTPATIENT
Start: 2021-08-04 | End: 2021-08-28 | Stop reason: SDUPTHER

## 2021-08-11 ENCOUNTER — TELEPHONE (OUTPATIENT)
Dept: ORTHOPEDIC SURGERY | Age: 41
End: 2021-08-11

## 2021-08-28 DIAGNOSIS — M25.561 ACUTE PAIN OF RIGHT KNEE: ICD-10-CM

## 2021-08-30 ENCOUNTER — HOSPITAL ENCOUNTER (OUTPATIENT)
Age: 41
Discharge: HOME | End: 2021-08-30
Payer: COMMERCIAL

## 2021-08-30 DIAGNOSIS — E55.9 VITAMIN D DEFICIENCY: ICD-10-CM

## 2021-08-30 DIAGNOSIS — E87.6 HYPOKALEMIA: ICD-10-CM

## 2021-08-30 DIAGNOSIS — E78.2 MIXED HYPERLIPIDEMIA: ICD-10-CM

## 2021-08-30 DIAGNOSIS — E53.8 B12 DEFICIENCY: ICD-10-CM

## 2021-08-30 LAB
ALT SERPL-CCNC: 14 U/L (ref 5–33)
CHOLESTEROL/HDL RATIO: 5.2
CHOLESTEROL: 196 MG/DL
HDLC SERPL-MCNC: 38 MG/DL
LDL CHOLESTEROL: 143 MG/DL (ref 0–130)
POTASSIUM SERPL-SCNC: 4.2 MMOL/L (ref 3.7–5.3)
TRIGL SERPL-MCNC: 76 MG/DL
VITAMIN B-12: 227 PG/ML (ref 232–1245)
VITAMIN D 25-HYDROXY: 26.3 NG/ML (ref 30–100)
VLDLC SERPL CALC-MCNC: ABNORMAL MG/DL (ref 1–30)

## 2021-08-30 PROCEDURE — 80061 LIPID PANEL: CPT

## 2021-08-30 PROCEDURE — 82306 VITAMIN D 25 HYDROXY: CPT

## 2021-08-30 PROCEDURE — 82607 VITAMIN B-12: CPT

## 2021-08-30 PROCEDURE — 84460 ALANINE AMINO (ALT) (SGPT): CPT

## 2021-08-30 PROCEDURE — 84132 ASSAY OF SERUM POTASSIUM: CPT

## 2021-08-30 PROCEDURE — 36415 COLL VENOUS BLD VENIPUNCTURE: CPT

## 2021-08-30 RX ORDER — ERGOCALCIFEROL 1.25 MG/1
50000 CAPSULE ORAL WEEKLY
Qty: 12 CAPSULE | Refills: 0 | Status: SHIPPED | OUTPATIENT
Start: 2021-08-30 | End: 2021-12-06 | Stop reason: SDUPTHER

## 2021-08-31 RX ORDER — MELOXICAM 7.5 MG/1
15 TABLET ORAL DAILY
Qty: 30 TABLET | Refills: 0 | Status: SHIPPED | OUTPATIENT
Start: 2021-08-31 | End: 2021-09-15 | Stop reason: SDUPTHER

## 2021-08-31 NOTE — TELEPHONE ENCOUNTER
Patient would like mobic refill   Last refill 8/4/21 #30    DX: Chondromalacia patellae of right knee

## 2021-09-02 ENCOUNTER — OFFICE VISIT (OUTPATIENT)
Dept: NEUROLOGY | Age: 41
End: 2021-09-02
Payer: COMMERCIAL

## 2021-09-02 VITALS
WEIGHT: 254 LBS | HEART RATE: 72 BPM | DIASTOLIC BLOOD PRESSURE: 89 MMHG | SYSTOLIC BLOOD PRESSURE: 132 MMHG | BODY MASS INDEX: 40.82 KG/M2 | HEIGHT: 66 IN

## 2021-09-02 DIAGNOSIS — G40.909 SEIZURE DISORDER (HCC): Primary | ICD-10-CM

## 2021-09-02 DIAGNOSIS — G90.A POTS (POSTURAL ORTHOSTATIC TACHYCARDIA SYNDROME): ICD-10-CM

## 2021-09-02 DIAGNOSIS — G43.009 MIGRAINE WITHOUT AURA AND WITHOUT STATUS MIGRAINOSUS, NOT INTRACTABLE: ICD-10-CM

## 2021-09-02 PROCEDURE — G8427 DOCREV CUR MEDS BY ELIG CLIN: HCPCS | Performed by: PSYCHIATRY & NEUROLOGY

## 2021-09-02 PROCEDURE — G8417 CALC BMI ABV UP PARAM F/U: HCPCS | Performed by: PSYCHIATRY & NEUROLOGY

## 2021-09-02 PROCEDURE — 1036F TOBACCO NON-USER: CPT | Performed by: PSYCHIATRY & NEUROLOGY

## 2021-09-02 PROCEDURE — 99214 OFFICE O/P EST MOD 30 MIN: CPT | Performed by: PSYCHIATRY & NEUROLOGY

## 2021-09-02 RX ORDER — TOPIRAMATE 100 MG/1
TABLET, FILM COATED ORAL
Qty: 90 TABLET | Refills: 5 | Status: SHIPPED | OUTPATIENT
Start: 2021-09-02 | End: 2022-06-13 | Stop reason: SDUPTHER

## 2021-09-02 RX ORDER — METOPROLOL SUCCINATE 25 MG/1
TABLET, EXTENDED RELEASE ORAL
COMMUNITY
Start: 2021-08-29 | End: 2021-09-02

## 2021-09-02 ASSESSMENT — ENCOUNTER SYMPTOMS
GASTROINTESTINAL NEGATIVE: 1
ALLERGIC/IMMUNOLOGIC NEGATIVE: 1
EYES NEGATIVE: 1
RESPIRATORY NEGATIVE: 1

## 2021-09-02 NOTE — PROGRESS NOTES
Active problem nocturnal generalized seizure disorder on topamax . Seizure type is grandmal . Postural orthostatic hypotension seeing cardiologist Dr Veronica Shah on midodrine . There is suggestion of sleep apnea with snoring obesity and norestorative sleep to undergo sleep study. The condition is she did not undergo sleep study having trouble scheduling with time restraints with work and taking care of her children  . There is improvement on stress working now at different facility not being as tired not snoring as much per boyfriend . There is borderline low 227 placed on B12 tablets. She reports that her boyfriend states she was having seizures in sleep twice per week up to 2 weeks ago when she changed jobs with hand clenching and generalized body shaking in sleep for 1 1/2 minutes . She has never had seizure while awake . She reports that with less stress he has had no more seizures . She has been diagnosed to have POTS placed on midodrine with symptoms of lightheadedness and heart racing . She has headaches twice per month over left frontal head attenuated with maxalt . Significant medications topamax 100 mg po bid , proamatine 2.5 mg po bid . Testing Head CT normal . MRI of Head normal . MRA of Head normal . EEG isolated sharp wave left mid temporal lobe      Past Medical History:   Diagnosis Date    Kidney stone 6/8/2016    Kidney stones     Migraine        Past Surgical History:   Procedure Laterality Date    APPENDECTOMY      CHOLECYSTECTOMY      CYSTOSCOPY  06/068/2016    lt ureteroscopy with holmium laser lithotripsy and lt ureteral stent    ENDOMETRIAL ABLATION      HYSTERECTOMY         History reviewed. No pertinent family history.     Social History     Socioeconomic History    Marital status:      Spouse name: None    Number of children: None    Years of education: None    Highest education level: None   Occupational History    None   Tobacco Use    Smoking status: Never Smoker    Smokeless tobacco: Never Used   Vaping Use    Vaping Use: Never used   Substance and Sexual Activity    Alcohol use: No    Drug use: No    Sexual activity: None   Other Topics Concern    None   Social History Narrative    None     Social Determinants of Health     Financial Resource Strain: Low Risk     Difficulty of Paying Living Expenses: Not hard at all   Food Insecurity: No Food Insecurity    Worried About Running Out of Food in the Last Year: Never true    Chema of Food in the Last Year: Never true   Transportation Needs: No Transportation Needs    Lack of Transportation (Medical): No    Lack of Transportation (Non-Medical):  No   Physical Activity:     Days of Exercise per Week:     Minutes of Exercise per Session:    Stress:     Feeling of Stress :    Social Connections:     Frequency of Communication with Friends and Family:     Frequency of Social Gatherings with Friends and Family:     Attends Hindu Services:     Active Member of Clubs or Organizations:     Attends Club or Organization Meetings:     Marital Status:    Intimate Partner Violence:     Fear of Current or Ex-Partner:     Emotionally Abused:     Physically Abused:     Sexually Abused:        Current Outpatient Medications   Medication Sig Dispense Refill    topiramate (TOPAMAX) 100 MG tablet Take 1 1/2 po bid 90 tablet 5    meloxicam (MOBIC) 7.5 MG tablet Take 2 tablets by mouth daily With food 30 tablet 0    vitamin D (ERGOCALCIFEROL) 1.25 MG (02677 UT) CAPS capsule Take 1 capsule by mouth once a week 12 capsule 0    cyanocobalamin ([employer] VITAMIN B12) 1000 MCG tablet Take 1 tablet by mouth daily 90 tablet 0    rizatriptan (MAXALT) 10 MG tablet Take 1 tablet by mouth once as needed for Migraine May repeat in 2 hours if needed 9 tablet 2    midodrine (PROAMATINE) 2.5 MG tablet Take 5 mg by mouth 3 times daily      cetirizine (ZYRTEC) 10 MG tablet TAKE 3 TABLETS BY MOUTH ONCE DAILY      famotidine (PEPCID) 20 MG tablet Take 20 mg by mouth daily       No current facility-administered medications for this visit. Facility-Administered Medications Ordered in Other Visits   Medication Dose Route Frequency Provider Last Rate Last Admin    lactated ringers infusion   IntraVENous Continuous Archana Brown MD   Stopped at 06/08/16 1830    fentaNYL (SUBLIMAZE) injection 25 mcg  25 mcg IntraVENous Q5 Min PRN Donna Valdivia MD        morphine injection 2 mg  2 mg IntraVENous Q5 Min PRN Donna Valdivia MD        labetalol (NORMODYNE;TRANDATE) injection 5 mg  5 mg IntraVENous Q10 Min PRN Donna Valdivia MD        hydrALAZINE (APRESOLINE) injection 5 mg  5 mg IntraVENous Q10 Min PRN Donna Valdivia MD        meperidine (DEMEROL) injection 12.5 mg  12.5 mg IntraVENous Q5 Min PRN Donna Valdivia MD           Allergies   Allergen Reactions    Other Swelling     Spider Bites         Review of Systems     Vitals:    09/02/21 1434   BP: 132/89   Pulse: 72     weight: 254 lb (115.2 kg)      Review of Systems   Constitutional: Negative. HENT: Negative. Eyes: Negative. Respiratory: Negative. Cardiovascular: Negative. Gastrointestinal: Negative. Endocrine: Negative. Genitourinary: Negative. Musculoskeletal: Negative. Skin: Negative. Allergic/Immunologic: Negative. Neurological: Negative. Hematological: Negative. Psychiatric/Behavioral: Negative. Neurological Examination  Constitutional .General exam well groomed   Head/Ears /Nose/Throat: external ear . Normal exam  Neck and thyroid . Normal size. No bruits  Respiratory . Breathsounds clear bilaterally  Cardiovascular:  Auscultation of heart with regular rate and rhythm  Musculoskeletal. Muscle bulk and tone normal                                                           Muscle strength 5/5 strength throughout                                                                               No dysmetria or dysdiadokinesis  No tremor   Normal fine motor  Gait normal   Orientation Alert and oriented x 3   Attention and concentration normal  Short term memory normal  Language process and speech normal . No aphasia   Cranial nerve 2 normal acuety and visual fields  Cranial nerve 3, 4 and 6 . Extraocular muscles are intact . Pupils are equal and reactive   Cranial nerve 5 . Normal strength of masseter and temporalis . Intact corneal reflex. Normal facial sensation  Cranial nerve 7 normal exam   Cranial nerve 8. Grossly intact hearing   Cranial nerve 9 and 10. Symmetric palate elevation   Cranial nerve 11 , 5 out of 5 strength   Cranial Nerve 12 midline tongue . No atrophy  Sensation . Normal proprioception . Intact Vibration . Normal pinprick and light touch   Deep Tendon Reflexes normal  Plantar response flexor bilaterally      ASSESSMENT/PLAN      Diagnosis Orders   1. Seizure disorder ([address]     2. Migraine without aura and without status migrainosus, not intractable     3.  POTS (postural orthostatic tachycardia syndrome)     Will have her increase topamax to 150 mg po bid with nocturnal seizures restricting her driving      As above

## 2021-09-09 ENCOUNTER — OFFICE VISIT (OUTPATIENT)
Dept: ORTHOPEDIC SURGERY | Age: 41
End: 2021-09-09
Payer: COMMERCIAL

## 2021-09-09 VITALS — HEIGHT: 65 IN | BODY MASS INDEX: 42.65 KG/M2 | WEIGHT: 256 LBS

## 2021-09-09 DIAGNOSIS — M22.41 CHONDROMALACIA PATELLAE OF RIGHT KNEE: Primary | ICD-10-CM

## 2021-09-09 PROCEDURE — G8427 DOCREV CUR MEDS BY ELIG CLIN: HCPCS | Performed by: STUDENT IN AN ORGANIZED HEALTH CARE EDUCATION/TRAINING PROGRAM

## 2021-09-09 PROCEDURE — 99213 OFFICE O/P EST LOW 20 MIN: CPT | Performed by: STUDENT IN AN ORGANIZED HEALTH CARE EDUCATION/TRAINING PROGRAM

## 2021-09-09 PROCEDURE — G8417 CALC BMI ABV UP PARAM F/U: HCPCS | Performed by: STUDENT IN AN ORGANIZED HEALTH CARE EDUCATION/TRAINING PROGRAM

## 2021-09-09 PROCEDURE — 1036F TOBACCO NON-USER: CPT | Performed by: STUDENT IN AN ORGANIZED HEALTH CARE EDUCATION/TRAINING PROGRAM

## 2021-09-15 DIAGNOSIS — M25.561 ACUTE PAIN OF RIGHT KNEE: ICD-10-CM

## 2021-09-18 RX ORDER — MELOXICAM 7.5 MG/1
15 TABLET ORAL DAILY
Qty: 30 TABLET | Refills: 0 | Status: SHIPPED | OUTPATIENT
Start: 2021-09-18 | End: 2021-10-05 | Stop reason: SDUPTHER

## 2021-09-20 ENCOUNTER — HOSPITAL ENCOUNTER (OUTPATIENT)
Dept: MRI IMAGING | Age: 41
Discharge: HOME | End: 2021-09-22
Payer: COMMERCIAL

## 2021-09-20 DIAGNOSIS — M22.41 CHONDROMALACIA PATELLAE OF RIGHT KNEE: ICD-10-CM

## 2021-09-20 PROCEDURE — 73721 MRI JNT OF LWR EXTRE W/O DYE: CPT

## 2021-09-24 ENCOUNTER — OFFICE VISIT (OUTPATIENT)
Dept: ORTHOPEDIC SURGERY | Age: 41
End: 2021-09-24
Payer: COMMERCIAL

## 2021-09-24 VITALS — WEIGHT: 248 LBS | HEIGHT: 65 IN | BODY MASS INDEX: 41.32 KG/M2

## 2021-09-24 DIAGNOSIS — M22.41 CHONDROMALACIA PATELLAE OF RIGHT KNEE: ICD-10-CM

## 2021-09-24 DIAGNOSIS — Z01.818 PRE-OP TESTING: ICD-10-CM

## 2021-09-24 DIAGNOSIS — S83.241A TEAR OF MEDIAL MENISCUS OF RIGHT KNEE, CURRENT, UNSPECIFIED TEAR TYPE, INITIAL ENCOUNTER: ICD-10-CM

## 2021-09-24 DIAGNOSIS — M17.11 ARTHRITIS OF RIGHT KNEE: Primary | ICD-10-CM

## 2021-09-24 PROCEDURE — 1036F TOBACCO NON-USER: CPT | Performed by: ORTHOPAEDIC SURGERY

## 2021-09-24 PROCEDURE — G8427 DOCREV CUR MEDS BY ELIG CLIN: HCPCS | Performed by: ORTHOPAEDIC SURGERY

## 2021-09-24 PROCEDURE — 99214 OFFICE O/P EST MOD 30 MIN: CPT | Performed by: ORTHOPAEDIC SURGERY

## 2021-09-24 PROCEDURE — G8417 CALC BMI ABV UP PARAM F/U: HCPCS | Performed by: ORTHOPAEDIC SURGERY

## 2021-09-24 ASSESSMENT — ENCOUNTER SYMPTOMS
COUGH: 0
VOMITING: 0
ABDOMINAL PAIN: 0
APNEA: 0
CHEST TIGHTNESS: 0

## 2021-09-24 NOTE — LETTER
[facility] [address]  Dept: [phone]  Dept Fax: [phone]      Neurology Evaluation for Surgery    Patient Name:  Rishabh Grad    1980     Type of Surgery:   Right knee arthroscopy partial medial meniscectomy, possible lateral release. DOS 10/19/21    Patient has been medically evaluated for the above surgery. __________He/She has a acceptable low risk for the proposed surgery. __________He/She has a moderate risk for the proposed surgery. __________He/She has a high risk for the proposed surgery. __________He/She needs further testing/consultation. Thank you for your consultation. Should you have any questions, please do not hesitate to call the office.       Sincerely,       [facility]       Sign_____________________________________________Date__________________

## 2021-09-24 NOTE — LETTER
[address] [place] [address]  Dept: [phone]  Dept Fax: [phone]      Cardiac Evaluation for Surgery    Patient Name: Sharri Morning [birth year]    Type of Surgery:   Right knee scope partial medial meniscectomy, possible lateral release DOS 10/19/21    Patient has been evaluated for the above surgery. __________He/She has an acceptable low risk for the proposed surgery. __________He/She has a moderate risk for the proposed surgery. __________He/She has a high risk for the proposed surgery. __________He/She needs further testing/consultation. Thank you for your consultation. Should you have any questions, please do not hesitate to call the office.       Sincerely,         [address]           Sign_____________________________________________Date__________________

## 2021-09-24 NOTE — PROGRESS NOTES
[facility]  [facility]  [address] [facility] [address]  Dept: [phone]  Dept Fax: [phone]        Ambulatory Follow Up      Subjective:   Juan Mcgill is a 39y.o. year old female who presents to our office today for routine followup regarding her   1. Arthritis of right knee    2. Tear of medial meniscus of right knee, current, unspecified tear type, initial encounter    3. Chondromalacia patellae of right knee    4. Pre-op testing    . Chief Complaint   Patient presents with    Follow-up     REVIEW MRI RESULTS OF RT KNEE        HPI- Juan Mcgill  is a 39 y.o. female who presents today in follow for right knee pain. The patient was last seen on 9/9/2021 and underwent treatment in the form of MRI of the right knee. MRI of the right knee was completed on 9/20/21. The patient has not had any improvement to the right knee since her last visit. The patient still having catching and locking of the right knee. Patient has tried corticosteroid injections and formal therapy without relief in pain. Patient has a history of POTS and seizures. Patient is not currently on a blood thinner. Review of Systems   Constitutional: Negative for chills and fever. Respiratory: Negative for apnea, cough and chest tightness. Cardiovascular: Negative for chest pain and palpitations. Gastrointestinal: Negative for abdominal pain and vomiting. Genitourinary: Negative for difficulty urinating. Musculoskeletal: Positive for arthralgias (right knee). Negative for gait problem, joint swelling and myalgias. Neurological: Negative for dizziness, weakness and numbness. I have reviewed the CC, HPI, ROS, PMH, FHX, Social History, and if not present in this note, I have reviewed in the patient's chart. I agree with the documentation provided by other staff and have reviewed their documentation prior to providing my signature indicating agreement.     Objective :   Ht 5' 5\" (1.651 m)   Wt 248 lb (112.5 kg)   BMI 41.27 kg/m²  Body mass index is 41.27 kg/m². General: Janiya Cervantes is a 39 y.o. female who is alert and oriented and sitting comfortably in our office. Ortho Exam  MS:  Patient relates a mild shortening weightbearing phase to the Right lower extremity. Mild knee effusion is noted to the Right lower extremity. There is no erythema, warmth, skin lesions, signs of infection. Positive Patellar crepitance is noted on the. Negative J sign is noted. Negative Patellar apprehension is noted. PositiveLateral patellar compression test is noted. There is no instability with varus and valgus stress applied at 0 and 30° of flexion. There is negative anterior drawer Lachman's test.  Negative Reynold's testing is appreciated. There is negative hip log roll and Stinchfield test noted. Full range of motion of the ankle is noted. Motor, sensory, vascular examination to the lower extremity is appreciated. Equivocal Reynold's on the right. Neuro: alert and oriented to person and place. Eyes: Extra-ocular muscles intact  Mouth: Oral mucosa moist. No perioral lesions  Pulm: Respirations unlabored and regular. Symmetric chest excursion without outward deformity is noted. Skin: warm, well perfused  Psych:   Patient has good fund of knowledge and displays understanging of exam, diagnosis, and plan. Radiology:     MRI KNEE RIGHT WO CONTRAST    Result Date: 9/22/2021  EXAMINATION: MRI OF THE RIGHT KNEE WITHOUT CONTRAST, 9/20/2021 6:27 pm TECHNIQUE: Multiplanar multisequence MRI of the right knee was performed without the administration of intravenous contrast. COMPARISON: Radiographs dated 04/14/2021 HISTORY: ORDERING SYSTEM PROVIDED HISTORY:  Chondromalacia patellae of right knee. TECHNOLOGIST PROVIDED HISTORY: Chronic right knee pain. Is the patient pregnant? No Reason for Exam:  Medial knee pain FINDINGS: MENISCI: The lateral meniscus is intact.  There is abnormal proton density signal extending through the entirety of the medial meniscus reaching both the superior and inferior articular surfaces as well as the periphery and free edge as seen on coronal images. There is mild medial meniscal body extrusion. A parameniscal cyst is not identified. CRUCIATE LIGAMENTS: The cruciate ligaments are intact. EXTENSOR MECHANISM: The extensor mechanism is intact. LATERAL COLLATERAL LIGAMENT COMPLEX: The popliteus tendon, fibular collateral ligament, biceps femoris tendon and iliotibial band are intact. MEDIAL COLLATERAL LIGAMENT COMPLEX: The medial collateral ligament is intact. KNEE JOINT: There is fluid in semimembranosus bursa. There is a small knee joint effusion. There is no significant Baker's cyst. Patellofemoral cartilage thinning and fissuring is noted. There is high-grade fissuring along the trochlear notch. There is moderate cartilage loss throughout the medial compartment. There is no focal or full-thickness defect or subchondral change. Small marginal osteophytes are noted. Bone marrow: There is no evidence of acute fracture or dislocation. There is no focal or diffuse marrow replacing process. 1. Multidirectional tearing of the medial meniscal body with mild medial meniscal body extrusion. 2. Moderate patellofemoral cartilage degeneration. 3. Mild semimembranosus bursitis. 4. Mild to moderate medial compartment cartilage degeneration. Assessment:      1. Arthritis of right knee    2. Tear of medial meniscus of right knee, current, unspecified tear type, initial encounter    3. Chondromalacia patellae of right knee    4. Pre-op testing       Plan:      Reviewed MRI results with the patient today in the office. Discussed etiology and natural history of right knee medial meniscus tear and right knee arthritis.   The treatment options may include oral anti-inflammatories, bracing, injections, advanced imaging, activity modification, physical therapy and/or surgical intervention. Had a lengthy discussion with the patient today about her options going forward. Did discuss a right knee arthroscopy VS right knee arthroplasty. Discussed that a right knee arthroscopy may not help with her arthritis. The patient would like to proceed with right knee arthroscopy. The patient will follow up in the office 2 weeks after surgery. We discussed that the patient should call us with any concerns or questions. Follow up:Return for Two weeks postoperatively. No orders of the defined types were placed in this encounter. Orders Placed This Encounter   Procedures    XR CHEST (2 VW)     Standing Status:   Future     Standing Expiration Date:   10/14/2022     Order Specific Question:   Reason for exam:     Answer:   pre-op    Comprehensive Metabolic Panel     Standing Status:   Future     Standing Expiration Date:   1/22/2022    CBC     Standing Status:   Future     Standing Expiration Date:   9/25/2022    Urinalysis     Standing Status:   Future     Standing Expiration Date:   9/25/2022    EKG 12 Lead     Standing Status:   Future     Standing Expiration Date:   9/25/2022     Order Specific Question:   Reason for Exam?     Answer:   Rufino Mccarty LPN am scribing for and in the presence of Dr. Rony Schuler  9/27/2021 7:36 AM      I have reviewed and made changes accordingly to the work scribed by Fernanda Haney LPN. The documentation accurately reflects work and decisions made by me. I have also reviewed documentation completed by clinical staff.     Rony Schuler DO, [address] [facility]  9/27/2021 7:37 AM    This note is created with the assistance of a speech recognition program.  While intending to generate a document that actually reflects the content of the visit, the document can still have some errors including those of syntax and sound a like substitutions which may escape proof reading.  In such instances, actual meaning can be extrapolated by contextual diversion      All details of the procedure, as well as risks, benefits and alternatives, including the option of non operative versus operative treatment were discussed. The patient understands that risks of the surgery include but are not limited to: bleeding, malunion/nonunion, loss of fixation, loss of reduction, hardware failure, angular or rotational deformity, length discrepancy, limp, transfusion, skin blistering or breakdown, progressive post traumatic degenerative joint disease, possible need for further surgery, bone grafting, infection, nerve injury, paralysis, numbness, blood vessel injury, excessive scaring, wound complication or breakdown, failure of symptoms to improve or actual deterioration in condition, significant acute and/or chronic pain, possible need for amputation, permanent loss of motion, and permanent loss of function. As well as the general complications of anesthesia, which include but are not limited to: myocardial infarction and/or heart attack, stroke, multi organ system failure or even possible death, prolonged hospital stay, blood clots, pulmonary embolism, abnormal reaction to medication, visual and neurological disturbances, constipation, ischemic bowel, bowel obstruction, bowel perforation, ileus and mental status changes. No guarantees were made.       Electronically signed by Flower Wolfe DO, Emmanuel Peaks on 9/27/2021 at 7:36 AM

## 2021-10-05 DIAGNOSIS — M25.561 ACUTE PAIN OF RIGHT KNEE: ICD-10-CM

## 2021-10-05 RX ORDER — MELOXICAM 7.5 MG/1
15 TABLET ORAL DAILY
Qty: 30 TABLET | Refills: 0 | Status: SHIPPED | OUTPATIENT
Start: 2021-10-05 | End: 2021-10-26 | Stop reason: SDUPTHER

## 2021-10-07 RX ORDER — SODIUM CHLORIDE, SODIUM LACTATE, POTASSIUM CHLORIDE, CALCIUM CHLORIDE 600; 310; 30; 20 MG/100ML; MG/100ML; MG/100ML; MG/100ML
1000 INJECTION, SOLUTION INTRAVENOUS CONTINUOUS
Status: CANCELLED | OUTPATIENT
Start: 2021-10-07

## 2021-10-08 ENCOUNTER — HOSPITAL ENCOUNTER (OUTPATIENT)
Dept: GENERAL RADIOLOGY | Age: 41
Discharge: HOME | End: 2021-10-10
Payer: COMMERCIAL

## 2021-10-08 ENCOUNTER — HOSPITAL ENCOUNTER (OUTPATIENT)
Dept: PREADMISSION TESTING | Age: 41
Discharge: HOME | End: 2021-10-12
Payer: COMMERCIAL

## 2021-10-08 VITALS
WEIGHT: 248 LBS | TEMPERATURE: 97.8 F | SYSTOLIC BLOOD PRESSURE: 126 MMHG | HEART RATE: 62 BPM | OXYGEN SATURATION: 98 % | BODY MASS INDEX: 41.32 KG/M2 | DIASTOLIC BLOOD PRESSURE: 83 MMHG | HEIGHT: 65 IN | RESPIRATION RATE: 18 BRPM

## 2021-10-08 DIAGNOSIS — Z01.818 PRE-OP TESTING: ICD-10-CM

## 2021-10-08 LAB
ALBUMIN SERPL-MCNC: 4.2 G/DL (ref 3.5–5.2)
ALBUMIN/GLOBULIN RATIO: 1.5 (ref 1–2.5)
ALP BLD-CCNC: 79 U/L (ref 35–104)
ALT SERPL-CCNC: 15 U/L (ref 5–33)
ANION GAP SERPL CALCULATED.3IONS-SCNC: 8 MMOL/L (ref 9–17)
AST SERPL-CCNC: 17 U/L
BILIRUB SERPL-MCNC: 0.28 MG/DL (ref 0.3–1.2)
BILIRUBIN URINE: NEGATIVE
BUN BLDV-MCNC: 13 MG/DL (ref 6–20)
BUN/CREAT BLD: ABNORMAL (ref 9–20)
CALCIUM SERPL-MCNC: 8.5 MG/DL (ref 8.6–10.4)
CHLORIDE BLD-SCNC: 110 MMOL/L (ref 98–107)
CO2: 21 MMOL/L (ref 20–31)
COLOR: YELLOW
COMMENT UA: NORMAL
CREAT SERPL-MCNC: 0.79 MG/DL (ref 0.5–0.9)
GFR AFRICAN AMERICAN: >60 ML/MIN
GFR NON-AFRICAN AMERICAN: >60 ML/MIN
GFR SERPL CREATININE-BSD FRML MDRD: ABNORMAL ML/MIN/{1.73_M2}
GFR SERPL CREATININE-BSD FRML MDRD: ABNORMAL ML/MIN/{1.73_M2}
GLUCOSE BLD-MCNC: 95 MG/DL (ref 70–99)
GLUCOSE URINE: NEGATIVE
HCT VFR BLD CALC: 43.8 % (ref 36.3–47.1)
HEMOGLOBIN: 13.4 G/DL (ref 11.9–15.1)
KETONES, URINE: NEGATIVE
LEUKOCYTE ESTERASE, URINE: NEGATIVE
MCH RBC QN AUTO: 27.9 PG (ref 25.2–33.5)
MCHC RBC AUTO-ENTMCNC: 30.6 G/DL (ref 28.4–34.8)
MCV RBC AUTO: 91.1 FL (ref 82.6–102.9)
NITRITE, URINE: NEGATIVE
NRBC AUTOMATED: 0 PER 100 WBC
PDW BLD-RTO: 12.7 % (ref 11.8–14.4)
PH UA: 7 (ref 5–8)
PLATELET # BLD: 275 K/UL (ref 138–453)
PMV BLD AUTO: 9.8 FL (ref 8.1–13.5)
POTASSIUM SERPL-SCNC: 3.9 MMOL/L (ref 3.7–5.3)
PROTEIN UA: NEGATIVE
RBC # BLD: 4.81 M/UL (ref 3.95–5.11)
SODIUM BLD-SCNC: 139 MMOL/L (ref 135–144)
SPECIFIC GRAVITY UA: 1.01 (ref 1–1.03)
TOTAL PROTEIN: 7 G/DL (ref 6.4–8.3)
TURBIDITY: CLEAR
URINE HGB: NEGATIVE
UROBILINOGEN, URINE: NORMAL
WBC # BLD: 7.1 K/UL (ref 3.5–11.3)

## 2021-10-08 PROCEDURE — 85027 COMPLETE CBC AUTOMATED: CPT

## 2021-10-08 PROCEDURE — 80053 COMPREHEN METABOLIC PANEL: CPT

## 2021-10-08 PROCEDURE — 36415 COLL VENOUS BLD VENIPUNCTURE: CPT

## 2021-10-08 PROCEDURE — 71046 X-RAY EXAM CHEST 2 VIEWS: CPT

## 2021-10-08 PROCEDURE — 81003 URINALYSIS AUTO W/O SCOPE: CPT

## 2021-10-08 ASSESSMENT — PAIN DESCRIPTION - PAIN TYPE: TYPE: ACUTE PAIN

## 2021-10-08 ASSESSMENT — PAIN DESCRIPTION - ORIENTATION: ORIENTATION: RIGHT

## 2021-10-08 ASSESSMENT — PAIN SCALES - GENERAL: PAINLEVEL_OUTOF10: 7

## 2021-10-08 ASSESSMENT — PAIN DESCRIPTION - LOCATION: LOCATION: KNEE

## 2021-10-08 NOTE — H&P
History and Physical    Pt Name: Aaron Ross  MRN: [id number]  YOB: 1980  Date of evaluation: 10/8/2021  Primary Care Physician: Beni Beaulieu MD    SUBJECTIVE:   History of Chief Complaint:    Aaron Ross is a 39 y.o. female who presents for PAT appointment. Patient states she has had right knee pain since January. She denies any injury to the knee but state she has a history of arthritis and was found to have a meniscal tear on imaging study. Patient reports the pain occurs all the time and exacerbates when she walks. Patient reports she has tried physical therapy with no pain relief but states meloxicam does make the pain manageable. Patient states her left knee is asymptomatic. Patient has been scheduled for KNEE ARTHROSCOPY, PARTIAL MEDIAL MENISECTOMY, PARTIAL LATERAL RELEASE  (3080 TABLE, SUPINE) - Right  Allergies  is allergic to other. Medications  Prior to Admission medications    Medication Sig Start Date End Date Taking?  Authorizing Provider   BIOTIN 5000 PO Take by mouth   Yes Historical Provider, MD   Fluticasone Propionate (FLONASE ALLERGY RELIEF NA) by Nasal route   Yes Historical Provider, MD   meloxicam (MOBIC) 7.5 MG tablet Take 2 tablets by mouth daily With food 10/5/21  Yes Pat Lunch, DO   topiramate (TOPAMAX) 100 MG tablet Take 1 1/2 po bid 9/2/21  Yes Mallika Garcia MD   vitamin D (ERGOCALCIFEROL) 1.25 MG (39924 UT) CAPS capsule Take 1 capsule by mouth once a week 8/30/21  Yes ANA Medina CNP   cyanocobalamin ([facility] VITAMIN B12) 1000 MCG tablet Take 1 tablet by mouth daily 8/30/21  Yes ANA Medina CNP   midodrine (PROAMATINE) 2.5 MG tablet Take 5 mg by mouth 3 times daily 5/5/21  Yes Historical Provider, MD   cetirizine (ZYRTEC) 10 MG tablet TAKE 3 TABLETS BY MOUTH ONCE DAILY 3/4/21  Yes Historical Provider, MD   famotidine (PEPCID) 20 MG tablet Take 20 mg by mouth daily   Yes Historical Provider, MD   rizatriptan (MAXALT) 10 MG tablet Take 1 tablet by mouth once as needed for Migraine May repeat in 2 hours if needed 6/21/21 9/2/21  Leonie Grijalva MD     Past Medical History    has a past medical history of Acute medial meniscus tear of right knee, Allergies, Arthritis, Kidney stone, Kidney stones, Migraine, POTS (postural orthostatic tachycardia syndrome), Seizures ([facility] [address] and Wellness examination. Past Surgical History   has a past surgical history that includes Hysterectomy; Cholecystectomy; Appendectomy; Endometrial ablation; and Cystocopy (06/068/2016). Social History   reports that she has never smoked. She has never used smokeless tobacco.    reports no history of alcohol use. reports no history of drug use. Marital Status   Children 4  Occupation   Family History  Family Status   Relation Name Status    Mother  Alive     family history includes Thyroid Disease in her mother; Uterine Cancer in her mother. Review of Systems:  CONSTITUTIONAL:   negative for fevers, chills, fatigue and malaise    EYES:   negative for double vision, blurred vision and photophobia    HEENT:   negative for tinnitus, epistaxis and sore throat     RESPIRATORY:   negative for cough, shortness of breath, wheezing     CARDIOVASCULAR:   negative for chest pain, palpitations, syncope, edema     GASTROINTESTINAL:   negative for nausea, vomiting     GENITOURINARY:   negative for incontinence     MUSCULOSKELETAL:   negative for neck or back pain reports right knee pain   NEUROLOGICAL:   Negative for weakness and tingling  negative for headaches and dizziness     PSYCHIATRIC:   negative for anxiety       OBJECTIVE:   VITALS:  height is 5' 5\" (1.651 m) and weight is 248 lb (112.5 kg). Her infrared temperature is 97.8 °F (36.6 °C). Her blood pressure is 126/83 and her pulse is 62. Her respiration is 18 and oxygen saturation is 98%. CONSTITUTIONAL:alert & oriented x 3, no acute distress. Calm and pleasant.   SKIN:  Warm and dry, no rashes to exposed areas of skin. HEAD:  Normocephalic, atraumatic. EYES: PERRL. EOMs intact. EARS:  Intact and equal bilaterally. No edema or thickening, without lumps, lesions, or discharge. Hearing grossly WNL. NOSE:  Nares patent. No rhinorrhea   MOUTH/THROAT:  Mucous membranes pink and moist, uvula midline, teeth appear to be intact. NECK:supple, no lymphadenopathy  LUNGS: Respirations even and non-labored. Clear to auscultation bilaterally, no wheezes, rales, or rhonchi. CARDIOVASCULAR: Regular rate and rhythm, no murmurs/rubs/gallops   ABDOMEN: soft, non-tender, non-distended, bowel sounds active x 4   EXTREMITIES: Mild edema noted to right knee. No varicosities to bilateral lower extremities. NEUROLOGIC: CN II-XII are grossly intact. Gait is antalgic, no use of ambulatory aide. Testing:   Labs pending: drawn 10/8/2021   Chest XRay:  10/8/2021  IMPRESSIONS:   Right knee medical meniscal tear.   PLANS:   KNEE ARTHROSCOPY, PARTIAL MEDIAL MENISECTOMY, PARTIAL LATERAL RELEASE  (3080 TABLE, SUPINE) - Right    Harsha Postal, APRN - CNP  Electronically signed 10/8/2021 at 11:19 AM

## 2021-10-08 NOTE — H&P (VIEW-ONLY)
History and Physical    Pt Name: Santi Nguyen  MRN: [id number]  YOB: 1980  Date of evaluation: 10/8/2021  Primary Care Physician: Destin Doe MD    SUBJECTIVE:   History of Chief Complaint:    Santi Nguyen is a 39 y.o. female who presents for PAT appointment. Patient states she has had right knee pain since January. She denies any injury to the knee but state she has a history of arthritis and was found to have a meniscal tear on imaging study. Patient reports the pain occurs all the time and exacerbates when she walks. Patient reports she has tried physical therapy with no pain relief but states meloxicam does make the pain manageable. Patient states her left knee is asymptomatic. Patient has been scheduled for KNEE ARTHROSCOPY, PARTIAL MEDIAL MENISECTOMY, PARTIAL LATERAL RELEASE  (3080 TABLE, SUPINE) - Right  Allergies  is allergic to other. Medications  Prior to Admission medications    Medication Sig Start Date End Date Taking?  Authorizing Provider   BIOTIN 5000 PO Take by mouth   Yes Historical Provider, MD   Fluticasone Propionate (FLONASE ALLERGY RELIEF NA) by Nasal route   Yes Historical Provider, MD   meloxicam (MOBIC) 7.5 MG tablet Take 2 tablets by mouth daily With food 10/5/21  Yes Yaron Warren DO   topiramate (TOPAMAX) 100 MG tablet Take 1 1/2 po bid 9/2/21  Yes Frida Simmons MD   vitamin D (ERGOCALCIFEROL) 1.25 MG (32175 UT) CAPS capsule Take 1 capsule by mouth once a week 8/30/21  Yes ANA Barba CNP   cyanocobalamin ([employer]S VITAMIN B12) 1000 MCG tablet Take 1 tablet by mouth daily 8/30/21  Yes ANA Barba CNP   midodrine (PROAMATINE) 2.5 MG tablet Take 5 mg by mouth 3 times daily 5/5/21  Yes Historical Provider, MD   cetirizine (ZYRTEC) 10 MG tablet TAKE 3 TABLETS BY MOUTH ONCE DAILY 3/4/21  Yes Historical Provider, MD   famotidine (PEPCID) 20 MG tablet Take 20 mg by mouth daily   Yes Historical Provider, MD   rizatriptan (MAXALT) 10 MG tablet Take 1 tablet by mouth once as needed for Migraine May repeat in 2 hours if needed 6/21/21 9/2/21  Zack Bernard MD     Past Medical History    has a past medical history of Acute medial meniscus tear of right knee, Allergies, Arthritis, Kidney stone, Kidney stones, Migraine, POTS (postural orthostatic tachycardia syndrome), Seizures ([facility] [address] and Wellness examination. Past Surgical History   has a past surgical history that includes Hysterectomy; Cholecystectomy; Appendectomy; Endometrial ablation; and Cystocopy (06/068/2016). Social History   reports that she has never smoked. She has never used smokeless tobacco.    reports no history of alcohol use. reports no history of drug use. Marital Status   Children 4  Occupation   Family History  Family Status   Relation Name Status    Mother  Alive     family history includes Thyroid Disease in her mother; Uterine Cancer in her mother. Review of Systems:  CONSTITUTIONAL:   negative for fevers, chills, fatigue and malaise    EYES:   negative for double vision, blurred vision and photophobia    HEENT:   negative for tinnitus, epistaxis and sore throat     RESPIRATORY:   negative for cough, shortness of breath, wheezing     CARDIOVASCULAR:   negative for chest pain, palpitations, syncope, edema     GASTROINTESTINAL:   negative for nausea, vomiting     GENITOURINARY:   negative for incontinence     MUSCULOSKELETAL:   negative for neck or back pain reports right knee pain   NEUROLOGICAL:   Negative for weakness and tingling  negative for headaches and dizziness     PSYCHIATRIC:   negative for anxiety       OBJECTIVE:   VITALS:  height is 5' 5\" (1.651 m) and weight is 248 lb (112.5 kg). Her infrared temperature is 97.8 °F (36.6 °C). Her blood pressure is 126/83 and her pulse is 62. Her respiration is 18 and oxygen saturation is 98%. CONSTITUTIONAL:alert & oriented x 3, no acute distress. Calm and pleasant.   SKIN:  Warm and dry, no rashes to exposed areas of skin. HEAD:  Normocephalic, atraumatic. EYES: PERRL. EOMs intact. EARS:  Intact and equal bilaterally. No edema or thickening, without lumps, lesions, or discharge. Hearing grossly WNL. NOSE:  Nares patent. No rhinorrhea   MOUTH/THROAT:  Mucous membranes pink and moist, uvula midline, teeth appear to be intact. NECK:supple, no lymphadenopathy  LUNGS: Respirations even and non-labored. Clear to auscultation bilaterally, no wheezes, rales, or rhonchi. CARDIOVASCULAR: Regular rate and rhythm, no murmurs/rubs/gallops   ABDOMEN: soft, non-tender, non-distended, bowel sounds active x 4   EXTREMITIES: Mild edema noted to right knee. No varicosities to bilateral lower extremities. NEUROLOGIC: CN II-XII are grossly intact. Gait is antalgic, no use of ambulatory aide. Testing:   Labs pending: drawn 10/8/2021   Chest XRay:  10/8/2021  IMPRESSIONS:   Right knee medical meniscal tear.   PLANS:   KNEE ARTHROSCOPY, PARTIAL MEDIAL MENISECTOMY, PARTIAL LATERAL RELEASE  (3080 TABLE, SUPINE) - Right    ANA Goode - CNP  Electronically signed 10/8/2021 at 11:19 AM

## 2021-10-08 NOTE — PROGRESS NOTES
Anesthesia Focused Assessment    Hx of anesthesia complications:  no  Family hx of anesthesia complications: Mother with prolonged emergence      Prior + Covid-19 test? no      STOP-BANG Sleep Apnea Questionnaire    SNORE loudly (heard through closed doors)? Yes  TIRED, fatigued, sleepy during daytime? No  OBSERVED stopping breathing during sleep? No  High blood PRESSURE or being treated? No    BMI over 35? Yes  AGE over 48? No  NECK circumference over 16\"? No  GENDER (male)? No             Total 2  High risk 5-8  Intermediate risk 3-4  Low risk 0-2    ----------------------------------------------------------------------------------------------------------------------  VLADIMIR                              No  If yes, machine? DM1                                            No  DM2                   No    Coronary Artery Disease      No  HTN         No  Defib/AICD/Pacemaker               No             Renal Failure                   No  If yes, on dialysis           Active smoker? No  Drinks alcohol? No  Illicit drugs? No  Dentition?        benign      Past Medical History:   Diagnosis Date    Acute medial meniscus tear of right knee     Allergies     dust, cockroaches, cigarettes, spider bites   Dr. Garth Granados [facility]    Arthritis     right knee    Kidney stone 6/8/2016    Kidney stones     Migraine     POTS (postural orthostatic tachycardia syndrome)     Dr. Daniel Cruz. V last visit 6/2021    Seizures ([facility] [address]     started 3/04/2021 Dr. Uriel Marti. last visit 9/02/2021    Wellness examination 07/14/2021    [facility] with Dr. Haroon Vásquez         Patient was evaluated in PAT & anesthesia guidelines were applied. NPO guidelines, medication instructions and scheduled arrival time were reviewed with patient. Anesthesia contacted:   No    Medical or cardiac clearance ordered: no, neurology and cardiac clearances pending.     ANA Sunshine CNP   10/8/21  11:10 AM

## 2021-10-12 ENCOUNTER — TELEPHONE (OUTPATIENT)
Dept: NEUROLOGY | Age: 41
End: 2021-10-12

## 2021-10-13 DIAGNOSIS — S83.241A TEAR OF MEDIAL MENISCUS OF RIGHT KNEE, CURRENT, UNSPECIFIED TEAR TYPE, INITIAL ENCOUNTER: Primary | ICD-10-CM

## 2021-10-14 ENCOUNTER — OFFICE VISIT (OUTPATIENT)
Dept: NEUROLOGY | Age: 41
End: 2021-10-14
Payer: COMMERCIAL

## 2021-10-14 ENCOUNTER — TELEPHONE (OUTPATIENT)
Dept: NEUROLOGY | Age: 41
End: 2021-10-14

## 2021-10-14 VITALS
WEIGHT: 248 LBS | BODY MASS INDEX: 41.32 KG/M2 | SYSTOLIC BLOOD PRESSURE: 137 MMHG | HEART RATE: 70 BPM | TEMPERATURE: 97.2 F | HEIGHT: 65 IN | DIASTOLIC BLOOD PRESSURE: 82 MMHG

## 2021-10-14 DIAGNOSIS — G90.A POTS (POSTURAL ORTHOSTATIC TACHYCARDIA SYNDROME): ICD-10-CM

## 2021-10-14 DIAGNOSIS — G43.009 MIGRAINE WITHOUT AURA AND WITHOUT STATUS MIGRAINOSUS, NOT INTRACTABLE: ICD-10-CM

## 2021-10-14 DIAGNOSIS — G40.909 SEIZURE DISORDER (HCC): Primary | ICD-10-CM

## 2021-10-14 PROCEDURE — G8427 DOCREV CUR MEDS BY ELIG CLIN: HCPCS | Performed by: PSYCHIATRY & NEUROLOGY

## 2021-10-14 PROCEDURE — 99214 OFFICE O/P EST MOD 30 MIN: CPT | Performed by: PSYCHIATRY & NEUROLOGY

## 2021-10-14 PROCEDURE — G8417 CALC BMI ABV UP PARAM F/U: HCPCS | Performed by: PSYCHIATRY & NEUROLOGY

## 2021-10-14 PROCEDURE — G8484 FLU IMMUNIZE NO ADMIN: HCPCS | Performed by: PSYCHIATRY & NEUROLOGY

## 2021-10-14 PROCEDURE — 1036F TOBACCO NON-USER: CPT | Performed by: PSYCHIATRY & NEUROLOGY

## 2021-10-14 ASSESSMENT — ENCOUNTER SYMPTOMS
RESPIRATORY NEGATIVE: 1
GASTROINTESTINAL NEGATIVE: 1
EYES NEGATIVE: 1
ALLERGIC/IMMUNOLOGIC NEGATIVE: 1

## 2021-10-14 NOTE — LETTER
Letter of Medical Necessity    10/14/2021            RE: Rebecca Sosa  [address]          Nela Morillo and Kay Vázquez    This letter is being provided to support that  Rebecca Sosa is neurologically cleared to proceed with knee surgery . Should you have any questions or concerns, please feel free to contact my office *.     Sincerely      Lazaro Fuentes MD

## 2021-10-14 NOTE — PROGRESS NOTES
Active problem nocturnal generalized seizure disorder readjusting topamax restricting driving  . Seizure type is grandmal . Last seizure in August 2021 . Postural orthostatic hypotension seeing cardiologist Dr Blake Kulkarni on midodrine . There is suggestion of sleep apnea with snoring obesity and norestorative sleep to undergo sleep study. The condition is she has had no seizures since last seen tolertaing topmax well at 150 mg po bid tolerating this well . There is low B12 getting B12 tablets . There is daytime fatigue and sleepiness unable to get sleep study . She is having upcoming arthroscopic knee surgery having trouble scheduling with time restraints with work and taking care of her children  . There is improvement on stress working now at different facility not being as tired not snoring as much per boyfriend . She has headaches twice per month over left frontal head attenuated with maxalt . Significant medications topamax 150 mg po bid , proamatine 2.5 mg po bid .   Testing Head CT normal . MRI of Head normal . MRA of Head normal . EEG isolated sharp wave left mid temporal lobe      Past Medical History:   Diagnosis Date    Acute medial meniscus tear of right knee     Allergies     dust, cockroaches, cigarettes, spider bites   Dr. Kaveh Servin [facility]    Arthritis     right knee    Kidney stone 6/8/2016    Kidney stones     Migraine     POTS (postural orthostatic tachycardia syndrome)     Dr. Brooks Sage. V last visit 6/2021    Seizures ([address]     started 3/04/2021 Dr. Pramod Olvera. last visit 9/02/2021    Wellness examination 07/14/2021    Rohan Clarke CNP with Dr. Reginaldo Hill       Past Surgical History:   Procedure Laterality Date    APPENDECTOMY      CHOLECYSTECTOMY      CYSTOSCOPY  06/068/2016    lt ureteroscopy with holmium laser lithotripsy and lt ureteral stent    ENDOMETRIAL ABLATION      HYSTERECTOMY         Family History Problem Relation Age of Onset    Thyroid Disease Mother     Uterine Cancer Mother        Social History     Socioeconomic History    Marital status:      Spouse name: None    Number of children: None    Years of education: None    Highest education level: None   Occupational History    None   Tobacco Use    Smoking status: Never Smoker    Smokeless tobacco: Never Used   Vaping Use    Vaping Use: Never used   Substance and Sexual Activity    Alcohol use: No    Drug use: No    Sexual activity: None   Other Topics Concern    None   Social History Narrative    None     Social Determinants of Health     Financial Resource Strain: Low Risk     Difficulty of Paying Living Expenses: Not hard at all   Food Insecurity: No Food Insecurity    Worried About Running Out of Food in the Last Year: Never true    Chema of Food in the Last Year: Never true   Transportation Needs: No Transportation Needs    Lack of Transportation (Medical): No    Lack of Transportation (Non-Medical):  No   Physical Activity:     Days of Exercise per Week:     Minutes of Exercise per Session:    Stress:     Feeling of Stress :    Social Connections:     Frequency of Communication with Friends and Family:     Frequency of Social Gatherings with Friends and Family:     Attends Mandaen Services:     Active Member of Clubs or Organizations:     Attends Club or Organization Meetings:     Marital Status:    Intimate Partner Violence:     Fear of Current or Ex-Partner:     Emotionally Abused:     Physically Abused:     Sexually Abused:        Current Outpatient Medications   Medication Sig Dispense Refill    BIOTIN 5000 PO Take by mouth      Fluticasone Propionate (FLONASE ALLERGY RELIEF NA) by Nasal route      meloxicam (MOBIC) 7.5 MG tablet Take 2 tablets by mouth daily With food 30 tablet 0    topiramate (TOPAMAX) 100 MG tablet Take 1 1/2 po bid 90 tablet 5    vitamin D (ERGOCALCIFEROL) 1.25 MG (71075 UT) CAPS capsule Take 1 capsule by mouth once a week 12 capsule 0    cyanocobalamin (CVS VITAMIN B12) 1000 MCG tablet Take 1 tablet by mouth daily 90 tablet 0    rizatriptan (MAXALT) 10 MG tablet Take 1 tablet by mouth once as needed for Migraine May repeat in 2 hours if needed 9 tablet 2    midodrine (PROAMATINE) 2.5 MG tablet Take 5 mg by mouth 3 times daily      cetirizine (ZYRTEC) 10 MG tablet TAKE 3 TABLETS BY MOUTH ONCE DAILY      famotidine (PEPCID) 20 MG tablet Take 20 mg by mouth daily       No current facility-administered medications for this visit. Facility-Administered Medications Ordered in Other Visits   Medication Dose Route Frequency Provider Last Rate Last Admin    lactated ringers infusion   IntraVENous Continuous Ivelisse MD Constantin   Stopped at 06/08/16 1830    fentaNYL (SUBLIMAZE) injection 25 mcg  25 mcg IntraVENous Q5 Min PRN Marilou Leger MD        morphine injection 2 mg  2 mg IntraVENous Q5 Min PRN Marilou Leger MD        labetalol (NORMODYNE;TRANDATE) injection 5 mg  5 mg IntraVENous Q10 Min PRN Marilou Leger MD        hydrALAZINE (APRESOLINE) injection 5 mg  5 mg IntraVENous Q10 Min PRN Marilou Leger MD        meperidine (DEMEROL) injection 12.5 mg  12.5 mg IntraVENous Q5 Min PRN Marilou Leger MD           Allergies   Allergen Reactions    Other Swelling     Spider Bites         Review of Systems     Vitals:    10/14/21 1105   BP: 137/82   Pulse: 70   Temp: 97.2 °F (36.2 °C)     weight: 248 lb (112.5 kg)      Review of Systems   Constitutional: Negative. HENT: Negative. Eyes: Negative. Respiratory: Negative. Cardiovascular: Negative. Gastrointestinal: Negative. Endocrine: Negative. Genitourinary: Negative. Musculoskeletal: Negative. Skin: Negative. Allergic/Immunologic: Negative. Neurological: Negative. Hematological: Negative. Psychiatric/Behavioral: Negative.          Neurological Examination  Constitutional .General exam well groomed Head/Ears /Nose/Throat: external ear . Normal exam  Neck and thyroid . Normal size. No bruits  Respiratory . Breathsounds clear bilaterally  Cardiovascular: Auscultation of heart with regular rate and rhythm  Musculoskeletal. Muscle bulk and tone normal                                                           Muscle strength 5/5 strength throughout                                                                               No dysmetria or dysdiadokinesis  No tremor   Normal fine motor  Gait normal   Orientation Alert and oriented x 3   Attention and concentration normal  Short term memory normal  Language process and speech normal . No aphasia   Cranial nerve 2 normal acuety and visual fields  Cranial nerve 3, 4 and 6 . Extraocular muscles are intact . Pupils are equal and reactive   Cranial nerve 5 . Normal strength of masseter and temporalis . Intact corneal reflex. Normal facial sensation  Cranial nerve 7 normal exam   Cranial nerve 8. Grossly intact hearing   Cranial nerve 9 and 10. Symmetric palate elevation   Cranial nerve 11 , 5 out of 5 strength   Cranial Nerve 12 midline tongue . No atrophy  Sensation . Normal proprioception . Intact Vibration . Normal pinprick and light touch   Deep Tendon Reflexes normal  Plantar response flexor bilaterally      ASSESSMENT/PLAN      Diagnosis Orders   1. Seizure disorder ([address]     2. Migraine without aura and without status migrainosus, not intractable     3.  POTS (postural orthostatic tachycardia syndrome)     She is cleared for knee surgery to continue current medication regimen     As above

## 2021-10-18 ENCOUNTER — ANESTHESIA EVENT (OUTPATIENT)
Dept: OPERATING ROOM | Age: 41
End: 2021-10-18
Payer: COMMERCIAL

## 2021-10-19 ENCOUNTER — ANESTHESIA (OUTPATIENT)
Dept: OPERATING ROOM | Age: 41
End: 2021-10-19
Payer: COMMERCIAL

## 2021-10-19 ENCOUNTER — HOSPITAL ENCOUNTER (OUTPATIENT)
Age: 41
Setting detail: OUTPATIENT SURGERY
Discharge: HOME | End: 2021-10-19
Attending: ORTHOPAEDIC SURGERY | Admitting: ORTHOPAEDIC SURGERY
Payer: COMMERCIAL

## 2021-10-19 VITALS — TEMPERATURE: 96 F | DIASTOLIC BLOOD PRESSURE: 105 MMHG | OXYGEN SATURATION: 100 % | SYSTOLIC BLOOD PRESSURE: 120 MMHG

## 2021-10-19 VITALS
OXYGEN SATURATION: 99 % | RESPIRATION RATE: 17 BRPM | HEIGHT: 65 IN | TEMPERATURE: 97.2 F | BODY MASS INDEX: 41.32 KG/M2 | HEART RATE: 65 BPM | SYSTOLIC BLOOD PRESSURE: 119 MMHG | DIASTOLIC BLOOD PRESSURE: 76 MMHG | WEIGHT: 248 LBS

## 2021-10-19 DIAGNOSIS — G89.18 POST-OP PAIN: Primary | ICD-10-CM

## 2021-10-19 PROCEDURE — 7100000011 HC PHASE II RECOVERY - ADDTL 15 MIN: Performed by: ORTHOPAEDIC SURGERY

## 2021-10-19 PROCEDURE — 2709999900 HC NON-CHARGEABLE SUPPLY: Performed by: ORTHOPAEDIC SURGERY

## 2021-10-19 PROCEDURE — 29881 ARTHRS KNE SRG MNISECTMY M/L: CPT | Performed by: ORTHOPAEDIC SURGERY

## 2021-10-19 PROCEDURE — 2500000003 HC RX 250 WO HCPCS

## 2021-10-19 PROCEDURE — 6360000002 HC RX W HCPCS

## 2021-10-19 PROCEDURE — 7100000000 HC PACU RECOVERY - FIRST 15 MIN: Performed by: ORTHOPAEDIC SURGERY

## 2021-10-19 PROCEDURE — 2580000003 HC RX 258: Performed by: ANESTHESIOLOGY

## 2021-10-19 PROCEDURE — 3700000000 HC ANESTHESIA ATTENDED CARE: Performed by: ORTHOPAEDIC SURGERY

## 2021-10-19 PROCEDURE — 2500000003 HC RX 250 WO HCPCS: Performed by: ORTHOPAEDIC SURGERY

## 2021-10-19 PROCEDURE — 7100000010 HC PHASE II RECOVERY - FIRST 15 MIN: Performed by: ORTHOPAEDIC SURGERY

## 2021-10-19 PROCEDURE — 6360000002 HC RX W HCPCS: Performed by: ANESTHESIOLOGY

## 2021-10-19 PROCEDURE — 7100000001 HC PACU RECOVERY - ADDTL 15 MIN: Performed by: ORTHOPAEDIC SURGERY

## 2021-10-19 PROCEDURE — 2580000003 HC RX 258: Performed by: ORTHOPAEDIC SURGERY

## 2021-10-19 PROCEDURE — 6360000002 HC RX W HCPCS: Performed by: ORTHOPAEDIC SURGERY

## 2021-10-19 PROCEDURE — 3600000014 HC SURGERY LEVEL 4 ADDTL 15MIN: Performed by: ORTHOPAEDIC SURGERY

## 2021-10-19 PROCEDURE — 3600000004 HC SURGERY LEVEL 4 BASE: Performed by: ORTHOPAEDIC SURGERY

## 2021-10-19 PROCEDURE — 3700000001 HC ADD 15 MINUTES (ANESTHESIA): Performed by: ORTHOPAEDIC SURGERY

## 2021-10-19 RX ORDER — PROPOFOL 10 MG/ML
INJECTION, EMULSION INTRAVENOUS PRN
Status: DISCONTINUED | OUTPATIENT
Start: 2021-10-19 | End: 2021-10-19 | Stop reason: SDUPTHER

## 2021-10-19 RX ORDER — DEXAMETHASONE SODIUM PHOSPHATE 10 MG/ML
INJECTION INTRAMUSCULAR; INTRAVENOUS PRN
Status: DISCONTINUED | OUTPATIENT
Start: 2021-10-19 | End: 2021-10-19 | Stop reason: SDUPTHER

## 2021-10-19 RX ORDER — FENTANYL CITRATE 50 UG/ML
INJECTION, SOLUTION INTRAMUSCULAR; INTRAVENOUS PRN
Status: DISCONTINUED | OUTPATIENT
Start: 2021-10-19 | End: 2021-10-19 | Stop reason: SDUPTHER

## 2021-10-19 RX ORDER — HYDROCODONE BITARTRATE AND ACETAMINOPHEN 5; 325 MG/1; MG/1
1 TABLET ORAL
Status: DISCONTINUED | OUTPATIENT
Start: 2021-10-19 | End: 2021-10-19 | Stop reason: HOSPADM

## 2021-10-19 RX ORDER — GLYCOPYRROLATE 1 MG/5 ML
SYRINGE (ML) INTRAVENOUS PRN
Status: DISCONTINUED | OUTPATIENT
Start: 2021-10-19 | End: 2021-10-19 | Stop reason: SDUPTHER

## 2021-10-19 RX ORDER — MEPERIDINE HYDROCHLORIDE 50 MG/ML
12.5 INJECTION INTRAMUSCULAR; INTRAVENOUS; SUBCUTANEOUS EVERY 5 MIN PRN
Status: DISCONTINUED | OUTPATIENT
Start: 2021-10-19 | End: 2021-10-19 | Stop reason: HOSPADM

## 2021-10-19 RX ORDER — METOCLOPRAMIDE HYDROCHLORIDE 5 MG/ML
10 INJECTION INTRAMUSCULAR; INTRAVENOUS
Status: DISCONTINUED | OUTPATIENT
Start: 2021-10-19 | End: 2021-10-19 | Stop reason: HOSPADM

## 2021-10-19 RX ORDER — ROCURONIUM BROMIDE 10 MG/ML
INJECTION, SOLUTION INTRAVENOUS PRN
Status: DISCONTINUED | OUTPATIENT
Start: 2021-10-19 | End: 2021-10-19 | Stop reason: SDUPTHER

## 2021-10-19 RX ORDER — PROMETHAZINE HYDROCHLORIDE 25 MG/ML
6.25 INJECTION, SOLUTION INTRAMUSCULAR; INTRAVENOUS
Status: DISCONTINUED | OUTPATIENT
Start: 2021-10-19 | End: 2021-10-19 | Stop reason: HOSPADM

## 2021-10-19 RX ORDER — OXYCODONE HYDROCHLORIDE AND ACETAMINOPHEN 5; 325 MG/1; MG/1
1 TABLET ORAL EVERY 6 HOURS PRN
Qty: 28 TABLET | Refills: 0 | Status: SHIPPED | OUTPATIENT
Start: 2021-10-19 | End: 2021-10-26

## 2021-10-19 RX ORDER — ONDANSETRON 2 MG/ML
INJECTION INTRAMUSCULAR; INTRAVENOUS PRN
Status: DISCONTINUED | OUTPATIENT
Start: 2021-10-19 | End: 2021-10-19 | Stop reason: SDUPTHER

## 2021-10-19 RX ORDER — LIDOCAINE HYDROCHLORIDE 10 MG/ML
INJECTION, SOLUTION EPIDURAL; INFILTRATION; INTRACAUDAL; PERINEURAL PRN
Status: DISCONTINUED | OUTPATIENT
Start: 2021-10-19 | End: 2021-10-19 | Stop reason: SDUPTHER

## 2021-10-19 RX ORDER — KETOROLAC TROMETHAMINE 30 MG/ML
INJECTION, SOLUTION INTRAMUSCULAR; INTRAVENOUS PRN
Status: DISCONTINUED | OUTPATIENT
Start: 2021-10-19 | End: 2021-10-19 | Stop reason: SDUPTHER

## 2021-10-19 RX ORDER — DIPHENHYDRAMINE HYDROCHLORIDE 50 MG/ML
12.5 INJECTION INTRAMUSCULAR; INTRAVENOUS
Status: DISCONTINUED | OUTPATIENT
Start: 2021-10-19 | End: 2021-10-19 | Stop reason: HOSPADM

## 2021-10-19 RX ORDER — NEOSTIGMINE METHYLSULFATE 5 MG/5 ML
SYRINGE (ML) INTRAVENOUS PRN
Status: DISCONTINUED | OUTPATIENT
Start: 2021-10-19 | End: 2021-10-19 | Stop reason: SDUPTHER

## 2021-10-19 RX ORDER — SODIUM CHLORIDE, SODIUM LACTATE, POTASSIUM CHLORIDE, CALCIUM CHLORIDE 600; 310; 30; 20 MG/100ML; MG/100ML; MG/100ML; MG/100ML
1000 INJECTION, SOLUTION INTRAVENOUS CONTINUOUS
Status: DISCONTINUED | OUTPATIENT
Start: 2021-10-19 | End: 2021-10-19 | Stop reason: HOSPADM

## 2021-10-19 RX ORDER — MAGNESIUM HYDROXIDE 1200 MG/15ML
LIQUID ORAL CONTINUOUS PRN
Status: COMPLETED | OUTPATIENT
Start: 2021-10-19 | End: 2021-10-19

## 2021-10-19 RX ADMIN — PROPOFOL 170 MG: 10 INJECTION, EMULSION INTRAVENOUS at 13:35

## 2021-10-19 RX ADMIN — LIDOCAINE HYDROCHLORIDE 50 MG: 10 INJECTION, SOLUTION EPIDURAL; INFILTRATION; INTRACAUDAL; PERINEURAL at 13:35

## 2021-10-19 RX ADMIN — FENTANYL CITRATE 50 MCG: 50 INJECTION, SOLUTION INTRAMUSCULAR; INTRAVENOUS at 14:34

## 2021-10-19 RX ADMIN — HYDROMORPHONE HYDROCHLORIDE 0.5 MG: 1 INJECTION, SOLUTION INTRAMUSCULAR; INTRAVENOUS; SUBCUTANEOUS at 15:13

## 2021-10-19 RX ADMIN — HYDROMORPHONE HYDROCHLORIDE 0.5 MG: 1 INJECTION, SOLUTION INTRAMUSCULAR; INTRAVENOUS; SUBCUTANEOUS at 15:04

## 2021-10-19 RX ADMIN — ONDANSETRON 4 MG: 2 INJECTION, SOLUTION INTRAMUSCULAR; INTRAVENOUS at 14:37

## 2021-10-19 RX ADMIN — Medication 3 MG: at 14:40

## 2021-10-19 RX ADMIN — ROCURONIUM BROMIDE 40 MG: 10 INJECTION INTRAVENOUS at 13:35

## 2021-10-19 RX ADMIN — KETOROLAC TROMETHAMINE 30 MG: 30 INJECTION, SOLUTION INTRAMUSCULAR at 14:36

## 2021-10-19 RX ADMIN — Medication 0.2 MG: at 14:06

## 2021-10-19 RX ADMIN — Medication 0.2 MG: at 14:10

## 2021-10-19 RX ADMIN — DEXAMETHASONE SODIUM PHOSPHATE 10 MG: 10 INJECTION INTRAMUSCULAR; INTRAVENOUS at 13:52

## 2021-10-19 RX ADMIN — CEFAZOLIN 2000 MG: 10 INJECTION, POWDER, FOR SOLUTION INTRAVENOUS at 13:48

## 2021-10-19 RX ADMIN — FENTANYL CITRATE 25 MCG: 50 INJECTION, SOLUTION INTRAMUSCULAR; INTRAVENOUS at 14:19

## 2021-10-19 RX ADMIN — FENTANYL CITRATE 50 MCG: 50 INJECTION, SOLUTION INTRAMUSCULAR; INTRAVENOUS at 14:26

## 2021-10-19 RX ADMIN — FENTANYL CITRATE 25 MCG: 50 INJECTION, SOLUTION INTRAMUSCULAR; INTRAVENOUS at 14:04

## 2021-10-19 RX ADMIN — FENTANYL CITRATE 50 MCG: 50 INJECTION, SOLUTION INTRAMUSCULAR; INTRAVENOUS at 13:35

## 2021-10-19 RX ADMIN — ROCURONIUM BROMIDE 10 MG: 10 INJECTION INTRAVENOUS at 14:04

## 2021-10-19 RX ADMIN — SODIUM CHLORIDE, POTASSIUM CHLORIDE, SODIUM LACTATE AND CALCIUM CHLORIDE 1000 ML: 600; 310; 30; 20 INJECTION, SOLUTION INTRAVENOUS at 12:04

## 2021-10-19 RX ADMIN — ROCURONIUM BROMIDE 10 MG: 10 INJECTION INTRAVENOUS at 14:19

## 2021-10-19 RX ADMIN — Medication 0.6 MG: at 14:40

## 2021-10-19 RX ADMIN — HYDROMORPHONE HYDROCHLORIDE 0.5 MG: 1 INJECTION, SOLUTION INTRAMUSCULAR; INTRAVENOUS; SUBCUTANEOUS at 15:32

## 2021-10-19 ASSESSMENT — PULMONARY FUNCTION TESTS
PIF_VALUE: 52
PIF_VALUE: 18
PIF_VALUE: 0
PIF_VALUE: 18
PIF_VALUE: 23
PIF_VALUE: 18
PIF_VALUE: 18
PIF_VALUE: 17
PIF_VALUE: 4
PIF_VALUE: 18
PIF_VALUE: 19
PIF_VALUE: 17
PIF_VALUE: 18
PIF_VALUE: 31
PIF_VALUE: 18
PIF_VALUE: 17
PIF_VALUE: 33
PIF_VALUE: 17
PIF_VALUE: 1
PIF_VALUE: 7
PIF_VALUE: 23
PIF_VALUE: 18
PIF_VALUE: 17
PIF_VALUE: 19
PIF_VALUE: 17
PIF_VALUE: 5
PIF_VALUE: 18
PIF_VALUE: 0
PIF_VALUE: 4
PIF_VALUE: 18
PIF_VALUE: 19
PIF_VALUE: 0
PIF_VALUE: 17
PIF_VALUE: 18
PIF_VALUE: 17
PIF_VALUE: 18
PIF_VALUE: 18
PIF_VALUE: 17
PIF_VALUE: 0
PIF_VALUE: 18
PIF_VALUE: 2
PIF_VALUE: 18
PIF_VALUE: 18
PIF_VALUE: 24
PIF_VALUE: 18
PIF_VALUE: 17
PIF_VALUE: 24
PIF_VALUE: 17
PIF_VALUE: 18
PIF_VALUE: 17
PIF_VALUE: 28
PIF_VALUE: 18
PIF_VALUE: 17
PIF_VALUE: 18
PIF_VALUE: 19
PIF_VALUE: 18
PIF_VALUE: 17
PIF_VALUE: 18
PIF_VALUE: 18
PIF_VALUE: 3
PIF_VALUE: 30
PIF_VALUE: 17
PIF_VALUE: 18
PIF_VALUE: 17
PIF_VALUE: 2
PIF_VALUE: 3
PIF_VALUE: 3
PIF_VALUE: 18
PIF_VALUE: 17

## 2021-10-19 ASSESSMENT — PAIN DESCRIPTION - FREQUENCY: FREQUENCY: CONTINUOUS

## 2021-10-19 ASSESSMENT — PAIN SCALES - GENERAL
PAINLEVEL_OUTOF10: 10
PAINLEVEL_OUTOF10: 7
PAINLEVEL_OUTOF10: 10

## 2021-10-19 ASSESSMENT — PAIN DESCRIPTION - PAIN TYPE: TYPE: SURGICAL PAIN

## 2021-10-19 ASSESSMENT — PAIN - FUNCTIONAL ASSESSMENT: PAIN_FUNCTIONAL_ASSESSMENT: 0-10

## 2021-10-19 ASSESSMENT — PAIN DESCRIPTION - DESCRIPTORS
DESCRIPTORS: ACHING
DESCRIPTORS: ACHING

## 2021-10-19 ASSESSMENT — PAIN DESCRIPTION - ORIENTATION: ORIENTATION: RIGHT

## 2021-10-19 ASSESSMENT — PAIN DESCRIPTION - LOCATION: LOCATION: KNEE

## 2021-10-19 NOTE — OP NOTE
Operative Note      Patient: Shiloh Gaviria  YOB: 1980  MRN: [id number]    Date of Procedure: 10/19/2021    Pre-Op Diagnosis: RIGHT KNEE MEDIAL MENISCUS TEAR    Post-Op Diagnosis: RIGHT KNEE MEDIAL MENISCUS TEAR       Procedure(s):  1. RIGHT KNEE ARTHROSCOPY   2. RIGHT MEDIAL MENISECTOMY    Surgeon(s):  Kiran William DO    Assistant:   Resident: Handy Hagan MD    Anesthesia: General    Estimated Blood Loss (mL): 15 mL    IVF: 704 mL    Complications: None. Specimens:   * No specimens in log *    Implants:  * No implants in log *      Drains: * No LDAs found *    Findings: See detailed description. Detailed Description of Procedure: On the day of surgery the patient was met in the pre-operative unit where written consent was obtained and the operative site was marked with permanent marker. The patient was wheeled back to the operating suite and laid in the supine position on the OR table. Patient underwent induction of anesthesia and endotracheal intubation. Appropriate antibiotics were being infused at this time. A timeout was held and after all members were in agreement we proceeded forward with surgery. The leg was placed into a well padded arthroscopic leg hurst and a pillow was placed under the non-operative leg to protect the femoral neurovascular structures. After standard sterile preparation and draping of the right lower extremity was complete. Anatomical landmarks were drawn out, and a #11 blade was used to make the lateral portal incision. Blunt dissection was taken down through subcutaneous tissue and through capsule with hemostats. The blunt obturator and trochar were then inserted into the joint in flexion and the knee was taken into extension to enter the suprapatellar pouch. The portal was observed to be too high for proper visualization of the knee and a revised lateral portal was made slightly more inferiorly.  Again blunt dissection was performed and the obturator inserted, followed by the arthroscope. We then began the diagnostic arthroscopy. We began by examining the suprapatellar pouch. One small loose body was appreciated but no large plica bands were identified. Next we visualized the patellofemoral joint. There was significant chondromalacia at the medial facet. Patellofemoral tracking was analyzed and found to appear to be normal.     Next we entered the lateral then medial gutters and identified no loose bodies or large plica bands. We then entered the medial joint. At this time an 18 gauge spinal needle was used to determine the starting point for the medial portal. #11 blade was used to make the incision and the blunt obturator was used to enter the joint. The probe was then inserted into the joint. We examined the medial joint including the meniscus as well as articular cartilage. The meniscus was found to be unstable to probing at the posterior horn and medial aspect of the meniscus with pathologic excursion. There were no soft spots but there was moderate chondromalacia identified on the cartilaginous surface of the femur. Next the ACL and PCL were examined. Both were found to be intact, stable to probing, and free of any hemorrhage or disease. We examined the lateral joint including the meniscus as well as articular cartilage. The meniscus was found to be stable to probing without pathologic excursion. There were no soft spots or chondromalacia identified on the cartilaginous surfaces. At this time all fluid was extracted from the joint, instruments removed, and closure performed. A 18 gauge needle was then used to inject 80 mg depomedrol and 8 cc 0.5% marcaine plain into the joint. Portal sites were closed with 3-0 nylon suture. Sterile adaptic, 4x4s, ABDs, webril, and an ACE bandage were applied as dressing. Anesthesia was reversed and the patient was extubated without complication.  The patient was moved back over to the hospital bed and wheeled to the recovery unit in stable condition. Dr. Lela Gong was present for and active in all critical aspects of the case.        Electronically signed by Henok Kearney MD on 10/19/2021 at 2:48 PM

## 2021-10-19 NOTE — ANESTHESIA PRE PROCEDURE
Frequency Provider Last Rate Last Admin    lactated ringers infusion   IntraVENous Continuous Antoinette Murray MD   Stopped at 06/08/16 1830    fentaNYL (SUBLIMAZE) injection 25 mcg  25 mcg IntraVENous Q5 Min PRN Aly Laguna MD        morphine injection 2 mg  2 mg IntraVENous Q5 Min PRN Aly Laguna MD        labetalol (NORMODYNE;TRANDATE) injection 5 mg  5 mg IntraVENous Q10 Min PRN Aly Laguna MD        hydrALAZINE (APRESOLINE) injection 5 mg  5 mg IntraVENous Q10 Min PRN Aly Laguna MD        meperidine (DEMEROL) injection 12.5 mg  12.5 mg IntraVENous Q5 Min PRN Aly Laguna MD           Allergies:     Allergies   Allergen Reactions    Other Swelling     Spider Bites       Problem List:    Patient Active Problem List   Diagnosis Code    Kidney stone N20.0    Vaginitis N76.0    Tinea corporis B35.4    Menorrhagia N92.0    Dysmenorrhea N94.6    Encounter for sterilization Z30.2    Episode of unresponsiveness R41.89    POTS (postural orthostatic tachycardia syndrome) I49.8       Past Medical History:        Diagnosis Date    Acute medial meniscus tear of right knee     Allergies     dust, cockroaches, cigarettes, spider bites   Dr. Khadra Fowler [facility]    Arthritis     right knee    Kidney stone 6/8/2016    Kidney stones     Migraine     POTS (postural orthostatic tachycardia syndrome)     Dr. Regina Aviles. V last visit 6/2021    Seizures ([address]     started 3/04/2021 Dr. Annabella Shelley. last visit 9/02/2021    Wellness examination 07/14/2021    Danny Bueno CNP with Dr. Cooper Contreras       Past Surgical History:        Procedure Laterality Date    APPENDECTOMY      CHOLECYSTECTOMY      CYSTOSCOPY  06/068/2016    lt ureteroscopy with holmium laser lithotripsy and lt ureteral stent    ENDOMETRIAL ABLATION      HYSTERECTOMY      partial       Social History:    Social History     Tobacco Use    Smoking status: Never Smoker    Smokeless tobacco: Never Used   Substance Use Topics    Alcohol use: No                                Counseling given: Not Answered      Vital Signs (Current):   Vitals:    10/19/21 1148   BP: 116/81   Pulse: 62   Resp: 16   Temp: 97.2 °F (36.2 °C)   TempSrc: Temporal   SpO2: 99%   Weight: 248 lb (112.5 kg)   Height: 5' 5\" (1.651 m)                                              BP Readings from Last 3 Encounters:   10/19/21 116/81   10/14/21 137/82   10/08/21 126/83       NPO Status: Time of last liquid consumption: 0630                        Time of last solid consumption: 1930                        Date of last liquid consumption: 10/19/21                        Date of last solid food consumption: 10/18/21    BMI:   Wt Readings from Last 3 Encounters:   10/19/21 248 lb (112.5 kg)   10/14/21 248 lb (112.5 kg)   10/08/21 248 lb (112.5 kg)     Body mass index is 41.27 kg/m². CBC:   Lab Results   Component Value Date    WBC 7.1 10/08/2021    RBC 4.81 10/08/2021    HGB 13.4 10/08/2021    HCT 43.8 10/08/2021    MCV 91.1 10/08/2021    RDW 12.7 10/08/2021     10/08/2021       CMP:   Lab Results   Component Value Date     10/08/2021    K 3.9 10/08/2021     10/08/2021    CO2 21 10/08/2021    BUN 13 10/08/2021    CREATININE 0.79 10/08/2021    GFRAA >60 10/08/2021    LABGLOM >60 10/08/2021    GLUCOSE 95 10/08/2021    PROT 7.0 10/08/2021    CALCIUM 8.5 10/08/2021    BILITOT 0.28 10/08/2021    ALKPHOS 79 10/08/2021    AST 17 10/08/2021    ALT 15 10/08/2021       POC Tests: No results for input(s): POCGLU, POCNA, POCK, POCCL, POCBUN, POCHEMO, POCHCT in the last 72 hours.     Coags:   Lab Results   Component Value Date    PROTIME 10.4 07/24/2015    INR 1.0 07/24/2015    APTT 29.6 07/24/2015       HCG (If Applicable): No results found for: PREGTESTUR, PREGSERUM, HCG, HCGQUANT     ABGs: No results found for: PHART, PO2ART, XOU3MCJ, GTG0RLG, BEART, V3DFGNJC     Type & Screen (If Applicable):  No results

## 2021-10-19 NOTE — ANESTHESIA POSTPROCEDURE EVALUATION
Department of Anesthesiology  Postprocedure Note    Patient: Ivelisse Valdovinos  MRN: [id number]  YOB: 1980  Date of evaluation: 10/19/2021  Time:  7:13 PM     Procedure Summary     Date: 10/19/21 Room / Location: [address]    Anesthesia Start: 1330 Anesthesia Stop: 1501    Procedure: KNEE ARTHROSCOPY, PARTIAL MEDIAL MENISECTOMY, PARTIAL LATERAL RELEASE  (3080 TABLE, SUPINE) (Right ) Diagnosis: (RIGHT KNEE MEDIAL MENISCUS TEAR)    Surgeons: Vanesa Hurd DO Responsible Provider: Shanda Conway MD    Anesthesia Type: general ASA Status: 3          Anesthesia Type: general    Zaire Phase I: Zaire Score: 10    Zaire Phase II: Azire Score: 10    Last vitals: Reviewed and per EMR flowsheets.        Anesthesia Post Evaluation    Patient location during evaluation: PACU  Patient participation: complete - patient participated  Level of consciousness: awake and alert  Pain score: 3  Airway patency: patent  Nausea & Vomiting: no nausea and no vomiting  Complications: no  Cardiovascular status: hemodynamically stable  Respiratory status: acceptable  Hydration status: stable

## 2021-10-19 NOTE — INTERVAL H&P NOTE
Pt Name: Romana Ben  MRN: [id number]  YOB: 1980  Date of evaluation: 10/19/2021    I have reviewed the patient's history and physical examination completed in pre-admission testing on 10/19/2021. No changes to history or on examination today, unless noted below. Neurology and cardiology clearances obtained.      ANA Morales - CNP  10/19/21  11:52 AM

## 2021-10-22 ENCOUNTER — HOSPITAL ENCOUNTER (OUTPATIENT)
Dept: PHYSICAL THERAPY | Age: 41
Setting detail: THERAPIES SERIES
Discharge: HOME | End: 2021-10-22
Payer: COMMERCIAL

## 2021-10-22 PROCEDURE — 97162 PT EVAL MOD COMPLEX 30 MIN: CPT

## 2021-10-22 PROCEDURE — 97110 THERAPEUTIC EXERCISES: CPT

## 2021-10-22 PROCEDURE — 97016 VASOPNEUMATIC DEVICE THERAPY: CPT

## 2021-10-24 NOTE — PROGRESS NOTES
[address] [facility]  [address] [facility]         Phone: [phone]       Fax: [phone]    Physical Therapy Lower Extremity Evaluation    Date:  10/22/2021  Patient: Catherine Carcamo  : 1980  MRN: [id number]  Physician: Gaston Diaz MD     Insurance: [facility] 30 visits/year  Medical Diagnosis: (R) medial meniscus tear S83.241A  Rehab Codes: (R) knee pain M25.561  Onset date: surgery 10/19/21   Next Dr's appt.: 21  Visit Count:    Cancel/No Show: 0/0    Subjective: Patient presents to therapy with complaints of (R) knee pain following surgical intervention that occurred on 10/19/21- op report shows menisectomy. Patient arrived using crutches, but has been ambulating around home without crutches. Does have 4 stairs to get into home. Is limited with knee ROM, strength, and swelling with clinical testing today. Prior to surgery, patient noted insidious but sudden onset of symptoms starting in 2021. Noted previous history of patella \"bone removal\" surgery due to osteo defect per the patient when she was 13. Had not had issues until previously mentioned date.   Also medical history POTS  CC: complaints of (R) anterior knee pain post surgically  HPI: surgery 10/19/21    PMHx: [] Unremarkable [] Diabetes [] HTN  [] Pacemaker   [] MI/Heart Problems [] Cancer [] Arthritis [] Other:              [x] Refer to full medical chart  In Epic       Comorbidities:   [] Obesity [] Dialysis  [] Other:   [] Asthma/COPD [] Dementia [] Other:   [] Stroke [] Sleep apnea [] Other:   [] Vascular disease [] Rheumatic disease [] Other:   See Epic for comorbidities    Tests: [] X-Ray: [] MRI:  [] Other:     Medications: [x] Refer to full medical record [] None [] Other:  Allergies:      [x] Refer to full medical record [] None [] Other:    Function:  Hand Dominance  [] Right  [] Left  Working:  [] Normal Duty  [] Light Duty  [] Off D/T Condition  [] Retired    [] Not Employed    []  Disability  [] Other:           Return to work:   Job/ADL Description: no presently working    Previously (I) with all functional activity, currently limited as listed below  ADL/IADL Previous level of function Current level of function Who currently assists the patient with task   Bathing  [] Independent  [] Assist [] Independent  [] Assist UA- surgery   Dress/grooming [] Independent  [] Assist [] Independent  [] Assist difficulty   Transfer/mobility [] Independent  [] Assist [] Independent  [] Assist difficulty   Feeding [] Independent  [] Assist [] Independent  [] Assist    Toileting [] Independent  [] Assist [] Independent  [] Assist    Driving [] Independent  [] Assist [] Independent  [] Assist UA-    Housekeeping [] Independent  [] Assist [] Independent  [] Assist unable   Grocery shop/meal prep [] Independent  [] Assist [] Independent  [] Assist unable     Gait Prior level of function Current level of function    [] Independent  [] Assist [] Independent  [] Assist   Device: [] Independent [] Independent    [] Straight Cane [] Quad cane [] Straight Cane [] Quad cane    [] Standard walker [] Rolling walker   [] 4 wheeled walker [] Standard walker [] Rolling walker   [] 4 wheeled walker    [] Wheelchair [] Wheelchair     Pain:  [x] Yes  [] No Location: (R) knee, particularly anterior knee Pain Rating: (0-10 scale) 7/10  Pain altered Tx:  [] Yes  [] No  Action:  Symptoms:  [] Improving [] Worsening [] Same  Better:  [] AM    [] PM    [] Sit    [] Rise/Sit    []Stand    [] Walk    [] Lying    [] Other:  Worse: [] AM    [] PM    [] Sit    [] Rise/Sit    []Stand    [] Walk    [] Lying    [] Bend                             [] Valsalva    [] Other:  Sleep: [] OK    [] Disturbed    Objective:  ROM:  0-60 degrees heel slide, 85 degrees flexion over edge of table, able to get to 90 degrees at end of AROM exercises over edge of table    (I) SLR (R)    Girth testing:  Inferior patellar girth (R) 43cm, (L) 41cm  MP girth (R) 50cm, (L) 47.5cm  Superior patellar girth (R) 53cm, (L) 49cm        OBSERVATION No Deficit Deficit Not Tested Comments   Posture       Forward Head [] [] []    Rounded Shoulders [] [] []    Kyphosis [] [] []    Lordosis [] [] []    Lateral Shift [] [] []    Scoliosis [] [] []    Iliac Crest [] [] []    PSIS [] [] []    ASIS [] [] []    Genu Valgus [] [] []    Genu Varus [] [] []    Genu Recurvatum [] [] []    Pronation [] [] []    Supination [] [] []    Leg Length Discrp [] [] []    Slumped Sitting [] [] []    Palpation [] [] [] Well healing incisions, still with sutures   Sensation [] [] []    Edema [] [] []    Neurological [] [] []    Patellar Mobility [] [] []    Patellar Orientation [] [] []    Gait [] [] [] Analysis: Locks into extension during gait         FUNCTIONAL TESTS PAIN NO PAIN COMMENTS   Step Test 4 [] []    6 [x] []    8 [x] []    Squat [] []        Comments:    Assessment:  Patient with limited ROM, strength, functional tolerance of ambulation and stairs s/p surgical intervention [x] Patient would continue to benefit from skilled physical therapy services in order to: improve mobility, improve strength, improve functional tolerance of ambulation, prolonged standing, and stairs    Problems:    [x] ? Pain:     [x] ? ROM:    [x] ? Strength:    [x] ? Function:       STG: (to be met in 6 treatments)  1. ? Pain:0-1  2. ? ROM: 0-90 heel slide, 105 degrees over edge of table  3. ? Strength: able to tolerate strengthening over edge of table 4/5 for knee flexion, extension, hip testing  4. ? Function: Able to ambulate without (A) device with minimal to no antalgic gait, able to ascend/descend 6\" stairs  5.  Independent with Home Exercise Programs  LTG: (to be met in 12 treatments)  6. ? Pain:0/10  7. ? ROM: 0-110 heel slide  8. ? Strength: able to tolerate strengthening over edge of table 4+/5 for knee flexion, extension, hip testing, (B) squats past 70 degrees  9. ? Function: Able to ambulate without (A) device with minimal to no antalgic gait, able to ascend/descend 8\" stairs  10. Independent with Home Exercise Program            Patient goals: no pain      Functional Assessment Used: optimal 12/3 for ambulation, stairs, ambulation long distances 75% LIMITED  Current Status: Score  Goal Status: Score    Evaluation Complexity:  History (Personal factors, comorbidities) [] 0 [x] 1-2 [] 3+   Exam (limitations, restrictions) [] 1-2 [x] 3 [] 4+   Clinical presentation (progression) [] Stable [x] Evolving  [] Unstable   Decision Making [] Low [x] Moderate [] High    [] Low Complexity [x] Moderate Complexity [] High Complexity       Rehab Potential:  [] Good  [x] Fair  [] Poor   Suggested Professional Referral:  [x] No  [] Yes:  Barriers to Goal Achievement[de-identified]  [x] No  [] Yes:  Domestic Concerns:  [x] No  [] Yes:    Pt. Education:  [x] Plans/Goals, Risks/Benefits discussed  [x] Home exercise program    Method of Education: [x] Verbal  [] Demo  [x] Written  Comprehension of Education:  [x] Verbalizes understanding. [] Demonstrates understanding. [] Needs Review. [] Demonstrates/verbalizes understanding of HEP/Ed previously given.     Treatment Plan:  [x] Therapeutic Exercise   16056  [] Iontophoresis: 4 mg/mL Dexamethasone Sodium Phosphate  mAmin  61851   [] Therapeutic Activity  98187 [x] Vasopneumatic cold with compression  80576    [] Gait Training   60308 [] Ultrasound   70990   [x] Neuromuscular Re-education  82467 [] Electrical Stimulation Unattended  51101   [x] Manual Therapy  76268 [] Electrical Stimulation Attended  46025   [x] Instruction in HEP  [] Lumbar/Cervical Traction  47290   [] Aquatic Therapy   16249 [] Cold/hotpack    [] Massage   69437      [] Dry Needling, 1 or 2 muscles  43047   [] Biofeedback, first 15 minutes   18057  [] Biofeedback, additional 15 minutes   79856 [] Dry Needling, 3 or more muscles  62361       [] Medication allergies reviewed for use of    Dexamethasone Sodium Phosphate 4mg/ml     with iontophoresis treatments. Pt is not allergic. Frequency:  2-3 x/week for 12 visits    Todays Treatment:  Modalities: VASO- 15' knee sleeve, low compression, 40 degrees  Precautions:  Exercises:  Exercise Reps/ Time Weight/ Level Comments   Heel slide 15     Flexion over edge of table 15 x 5\"     Supine calf stretch 3 x 15\"     Supine hamstring stretch 3 x 15\"     SLR  15x     Other:    Specific Instructions for next treatment:    Treatment Charges: Mins Units   [x] Evaluation       []  Low       [x]  Moderate       []  High 20' 1   []  Modalities     []  Ther Exercise 15 1   []  Manual Therapy     []  Ther Activities     []  Aquatics     []  Neuromuscular     []  Gait Training     []  Dry Needling           1-2 muscles     []  Dry Needling           3 or more          muscles     [] Vasocompression 15 1   []  Other 50 3       TOTAL TREATMENT TIME: 50    Time in:1100   Time Out:1150    Electronically signed by: Allegra Velarde PT        Physician Signature:________________________________Date:__________________  By signing above or cosigning this note, I have reviewed this plan of care and certify a need for medically necessary rehabilitation services.      *PLEASE SIGN ABOVE AND FAX BACK ALL PAGES*

## 2021-10-25 ENCOUNTER — HOSPITAL ENCOUNTER (OUTPATIENT)
Dept: PHYSICAL THERAPY | Age: 41
Setting detail: THERAPIES SERIES
Discharge: HOME | End: 2021-10-25
Payer: COMMERCIAL

## 2021-10-25 PROCEDURE — 97110 THERAPEUTIC EXERCISES: CPT

## 2021-10-25 NOTE — FLOWSHEET NOTE
LE at end of treatment. Denies need for vasocompression today. [] No change. [] Other:    [x] Patient would continue to benefit from skilled physical therapy services in order to: improve mobility, improve strength, improve functional tolerance of ambulation, prolonged standing, and stairs    STG: (to be met in 6 treatments)  1. ? Pain:0-1  2. ? ROM: 0-90 heel slide, 105 degrees over edge of table  3. ? Strength: able to tolerate strengthening over edge of table 4/5 for knee flexion, extension, hip testing  4. ? Function: Able to ambulate without (A) device with minimal to no antalgic gait, able to ascend/descend 6\" stairs  5. Independent with Home Exercise Programs  LTG: (to be met in 12 treatments)  6. ? Pain:0/10  7. ? ROM: 0-110 heel slide  8. ? Strength: able to tolerate strengthening over edge of table 4+/5 for knee flexion, extension, hip testing, (B) squats past 70 degrees  9. ? Function: Able to ambulate without (A) device with minimal to no antalgic gait, able to ascend/descend 8\" stairs  10. Independent with Home Exercise Program            Patient goals: no pain    Pt. Education:  [x] Yes  [] No  [x] Reviewed Prior HEP/Ed  Method of Education: [x] Verbal  [x] Demo  [] Written  Comprehension of Education:  [x] Verbalizes understanding. [x] Demonstrates understanding. [] Needs review. [] Demonstrates/verbalizes HEP/Ed previously given. Plan: [x] Continue per plan of care.    [] Other:      Treatment Charges: Mins Units   []  Modalities     [x]  Ther Exercise 40 3   []  Manual Therapy     []  Ther Activities     []  Aquatics     []  Neuromuscular     [] Vasocompression     [] Gait Training     [] Dry needling        [] 1 or 2 muscles        [] 3 or more muscles     []  Other     Total Treatment time 40 3     Time In:  8436           Time Out: 0436    Electronically signed by:  Juliana Farmer PTA

## 2021-10-26 DIAGNOSIS — M25.561 ACUTE PAIN OF RIGHT KNEE: ICD-10-CM

## 2021-10-27 RX ORDER — MELOXICAM 7.5 MG/1
15 TABLET ORAL DAILY
Qty: 30 TABLET | Refills: 0 | Status: SHIPPED | OUTPATIENT
Start: 2021-10-27 | End: 2021-11-16 | Stop reason: SDUPTHER

## 2021-10-29 ENCOUNTER — HOSPITAL ENCOUNTER (OUTPATIENT)
Dept: PHYSICAL THERAPY | Age: 41
Setting detail: THERAPIES SERIES
Discharge: HOME | End: 2021-10-29
Payer: COMMERCIAL

## 2021-10-29 PROCEDURE — 97110 THERAPEUTIC EXERCISES: CPT

## 2021-10-29 PROCEDURE — 97112 NEUROMUSCULAR REEDUCATION: CPT

## 2021-11-01 ENCOUNTER — OFFICE VISIT (OUTPATIENT)
Dept: ORTHOPEDIC SURGERY | Age: 41
End: 2021-11-01

## 2021-11-01 VITALS — HEIGHT: 65 IN | WEIGHT: 248 LBS | BODY MASS INDEX: 41.32 KG/M2

## 2021-11-01 DIAGNOSIS — Z98.890 S/P MEDIAL MENISCECTOMY OF RIGHT KNEE: ICD-10-CM

## 2021-11-01 DIAGNOSIS — S83.241A TEAR OF MEDIAL MENISCUS OF RIGHT KNEE, CURRENT, UNSPECIFIED TEAR TYPE, INITIAL ENCOUNTER: Primary | ICD-10-CM

## 2021-11-01 DIAGNOSIS — Z98.890 S/P RIGHT KNEE ARTHROSCOPY: ICD-10-CM

## 2021-11-01 PROCEDURE — 99024 POSTOP FOLLOW-UP VISIT: CPT | Performed by: ORTHOPAEDIC SURGERY

## 2021-11-01 ASSESSMENT — ENCOUNTER SYMPTOMS
COUGH: 0
NAUSEA: 0
CONSTIPATION: 0
DIARRHEA: 0

## 2021-11-03 ENCOUNTER — HOSPITAL ENCOUNTER (OUTPATIENT)
Dept: PHYSICAL THERAPY | Age: 41
Setting detail: THERAPIES SERIES
Discharge: HOME | End: 2021-11-03
Payer: COMMERCIAL

## 2021-11-03 PROCEDURE — 97112 NEUROMUSCULAR REEDUCATION: CPT

## 2021-11-03 PROCEDURE — 97110 THERAPEUTIC EXERCISES: CPT

## 2021-11-04 ENCOUNTER — HOSPITAL ENCOUNTER (OUTPATIENT)
Dept: PHYSICAL THERAPY | Age: 41
Setting detail: THERAPIES SERIES
Discharge: HOME | End: 2021-11-04
Payer: COMMERCIAL

## 2021-11-04 PROCEDURE — 97110 THERAPEUTIC EXERCISES: CPT

## 2021-11-04 NOTE — FLOWSHEET NOTE
[facility]   [address]   Phone: [phone]   Fax: [phone]    Physical Therapy Daily Treatment Note      Date:  2021  Patient Name:  Selene Ta    :  1980  MRN: [id number]  Physician: Mina Jacques MD                          Insurance: [facility] 30 visits/year  Medical Diagnosis: (R) medial meniscus tear S83.241A               Rehab Codes: (R) knee pain M25.561  Onset date: surgery 10/19/21                Next Dr's appt.: 21  Visit Count: 5                           Cancel/No Show: 0/0    Subjective:  Pt states she is sore from previous therapy session. Pt reports mild pain symptoms. Pain:  [x] Yes  [] No Location: Right knee   Pain Rating: (0-10 scale) 4/10  Pain altered Tx:  [x] No  [] Yes  Action:  Comments:      Objective:  Modalities: VASO- 15' knee sleeve, low compression, 40 degrees (NT)  Precautions: History of Seizures; Grand mal   Exercises: Bold completed    Exercise Reps/ Time Weight/ Level Comments   Nustep  4'           Heel slide 15       Flexion over edge of table 15 x 5\"       Supine calf stretch 3 x 30\"       Supine hamstring stretch 3 x 15\"       Sidelying hip AB      LAQ  2x10 reps     SLR 2x10 reps R only    Clamshells 2x10  R only          Standing       gastroc stretch on SB  3x30\"     HS stretch on step  3x30\"ea     Heel raises  20x      3 way hip  10xea Red  bilat    Step ups  2x10 8\"    Side steps  2x10 8\"    Frw Lunges  10x      Side lunges  10x     Squats  2x10    60 degree flexion         Total Gym squats  20x   90 degrees flexion          Other:     Specific Instructions for next treatment: Continue to focus on knee ROM and progress strengthening per Protocol. Assessment: [x] Progressing toward goals. Initiated treatment with Nustep warm up followed by stretching with tenderness noted secondary to tight and sore musculature.  Implemented resisted 3 way hip with cueing to perform with correct posture and technique with good carry over. Pt able to perform step work, lunges, and squats with improved tolerance with knee flexion. Will continue to progress strengthening per pt tolerance. [] No change. [] Other:    [x] Patient would continue to benefit from skilled physical therapy services in order to: improve mobility, improve strength, improve functional tolerance of ambulation, prolonged standing, and stairs    STG: (to be met in 6 treatments)  ? Pain:0-1  ? ROM: 0-90 heel slide, 105 degrees over edge of table  ? Strength: able to tolerate strengthening over edge of table 4/5 for knee flexion, extension, hip testing  ? Function: Able to ambulate without (A) device with minimal to no antalgic gait, able to ascend/descend 6\" stairs  Independent with Home Exercise Programs  LTG: (to be met in 12 treatments)  ? Pain:0/10  ? ROM: 0-110 heel slide  ? Strength: able to tolerate strengthening over edge of table 4+/5 for knee flexion, extension, hip testing, (B) squats past 70 degrees  ? Function: Able to ambulate without (A) device with minimal to no antalgic gait, able to ascend/descend 8\" stairs  Independent with Home Exercise Program            Patient goals: no pain    Pt. Education:  [x] Yes  [] No  [x] Reviewed Prior HEP/Ed  Method of Education: [x] Verbal  [x] Demo  [] Written  Comprehension of Education:  [x] Verbalizes understanding. [x] Demonstrates understanding. [] Needs review. [] Demonstrates/verbalizes HEP/Ed previously given. Plan: [x] Continue per plan of care.    [] Other:      Treatment Charges: Mins Units   []  Modalities     [x]  Ther Exercise 40 3   []  Manual Therapy     []  Ther Activities     []  Aquatics     []  Neuromuscular     [] Vasocompression     [] Gait Training     [] Dry needling        [] 1 or 2 muscles        [] 3 or more muscles     []  Other     Total Treatment time 40 3     Time In:  2:08 pm           Time Out: 2:48 pm    Electronically

## 2021-11-12 ENCOUNTER — HOSPITAL ENCOUNTER (OUTPATIENT)
Dept: PHYSICAL THERAPY | Age: 41
Setting detail: THERAPIES SERIES
Discharge: HOME | End: 2021-11-12
Payer: COMMERCIAL

## 2021-11-12 PROCEDURE — 97112 NEUROMUSCULAR REEDUCATION: CPT

## 2021-11-12 PROCEDURE — 97110 THERAPEUTIC EXERCISES: CPT

## 2021-11-17 ENCOUNTER — OFFICE VISIT (OUTPATIENT)
Dept: FAMILY MEDICINE CLINIC | Age: 41
End: 2021-11-17
Payer: COMMERCIAL

## 2021-11-17 ENCOUNTER — HOSPITAL ENCOUNTER (OUTPATIENT)
Dept: PHYSICAL THERAPY | Age: 41
Setting detail: THERAPIES SERIES
Discharge: HOME | End: 2021-11-17
Payer: COMMERCIAL

## 2021-11-17 VITALS
HEART RATE: 84 BPM | TEMPERATURE: 97.2 F | HEIGHT: 65 IN | DIASTOLIC BLOOD PRESSURE: 74 MMHG | WEIGHT: 243.8 LBS | SYSTOLIC BLOOD PRESSURE: 100 MMHG | BODY MASS INDEX: 40.62 KG/M2

## 2021-11-17 DIAGNOSIS — E53.8 B12 DEFICIENCY: ICD-10-CM

## 2021-11-17 DIAGNOSIS — G90.A POTS (POSTURAL ORTHOSTATIC TACHYCARDIA SYNDROME): ICD-10-CM

## 2021-11-17 DIAGNOSIS — E55.9 VITAMIN D DEFICIENCY: ICD-10-CM

## 2021-11-17 DIAGNOSIS — E78.2 MIXED HYPERLIPIDEMIA: Primary | ICD-10-CM

## 2021-11-17 PROCEDURE — G8484 FLU IMMUNIZE NO ADMIN: HCPCS | Performed by: NURSE PRACTITIONER

## 2021-11-17 PROCEDURE — G8427 DOCREV CUR MEDS BY ELIG CLIN: HCPCS | Performed by: NURSE PRACTITIONER

## 2021-11-17 PROCEDURE — 99214 OFFICE O/P EST MOD 30 MIN: CPT | Performed by: NURSE PRACTITIONER

## 2021-11-17 PROCEDURE — G8417 CALC BMI ABV UP PARAM F/U: HCPCS | Performed by: NURSE PRACTITIONER

## 2021-11-17 PROCEDURE — 97110 THERAPEUTIC EXERCISES: CPT

## 2021-11-17 PROCEDURE — 1036F TOBACCO NON-USER: CPT | Performed by: NURSE PRACTITIONER

## 2021-11-17 ASSESSMENT — ENCOUNTER SYMPTOMS
ABDOMINAL PAIN: 0
SHORTNESS OF BREATH: 0
NAUSEA: 0
VOMITING: 0
WHEEZING: 0

## 2021-11-17 NOTE — FLOWSHEET NOTE
[facility]   [address]   Phone: [phone]   Fax: [phone]    Physical Therapy Daily Treatment Note    Date:  2021  Patient Name:  Nae Calderón    :  1980  MRN: [id number]  Physician: Romayne Hobby MD                          Insurance: [facility] 30 visits/year  Medical Diagnosis: (R) medial meniscus tear S83.241A               Rehab Codes: (R) knee pain M25.561  Onset date: surgery 10/19/21                Next Dr's appt.: 21  Visit Count:                            Cancel/No Show: 0/0    Subjective:  Pt states increased soreness since last visit. Notes knee has been more swollen at time too. Reports has been taking it easy at work when possible but had to work 10 days straight and knee was hurting afterwards.   Pain:  [x] Yes  [] No Location: Right knee   Pain Rating: (0-10 scale) 4/10  Pain altered Tx:  [x] No  [] Yes  Action:  Comments:      Objective:  Modalities: VASO- 15' knee sleeve, low compression, 40 degrees (NT)  Precautions: History of Seizures; Grand mal   Exercises:   Exercise Reps/ Time Weight/ Level Comments Completed today   Nustep  5'   x          Heel slide 15        Flexion over edge of table 15 x 5\"        Supine calf stretch 3 x 30\"        Supine hamstring stretch 3 x 15\"        Sidelying hip AB       LAQ  2x10 reps      SLR 2x10 reps R only     Clamshells 2x10  R only            Standing        gastroc stretch on SB  3x30\"   x   HS stretch on step  3x30\"ea   x   Heel raises  20x    x   3 way hip  10xea Red  bilat  x   Step ups  2x10 8\"  x   Side steps  2x10 8\"  x   Frw Lunges  10x    x   Side lunges  10x   x   Squats  2x10    60 degree flexion x   TKE 20x Red  x   Total Gym squats  20x   90 degrees flexion  x   Heel tap 10x 2\" Painful x   Squat w/ weighted ball x20 reps #4  x          Other:     Specific Instructions for next treatment: Continue to focus on knee ROM and progress strengthening per Protocol. Assessment: [x] Progressing toward goals. Completed all exercises above with minimal pain noted. Notes discomfort with heel taps on 2\" box but able to complete. Continues to note increased sensitivity around knee with limited tolerance to touch. Used towel behind knee when performing TKE.    [] No change. [] Other:    [x] Patient would continue to benefit from skilled physical therapy services in order to: improve mobility, improve strength, improve functional tolerance of ambulation, prolonged standing, and stairs    STG: (to be met in 6 treatments)  1. ? Pain:0-1  2. ? ROM: 0-90 heel slide, 105 degrees over edge of table  3. ? Strength: able to tolerate strengthening over edge of table 4/5 for knee flexion, extension, hip testing  4. ? Function: Able to ambulate without (A) device with minimal to no antalgic gait, able to ascend/descend 6\" stairs  5. Independent with Home Exercise Programs  LTG: (to be met in 12 treatments)  6. ? Pain:0/10  7. ? ROM: 0-110 heel slide  8. ? Strength: able to tolerate strengthening over edge of table 4+/5 for knee flexion, extension, hip testing, (B) squats past 70 degrees  9. ? Function: Able to ambulate without (A) device with minimal to no antalgic gait, able to ascend/descend 8\" stairs  10. Independent with Home Exercise Program            Patient goals: no pain    Pt. Education:  [x] Yes  [] No  [x] Reviewed Prior HEP/Ed  Method of Education: [x] Verbal  [x] Demo  [] Written  Comprehension of Education:  [x] Verbalizes understanding. [x] Demonstrates understanding. [] Needs review. [] Demonstrates/verbalizes HEP/Ed previously given. Plan: [x] Continue per plan of care.    [] Other:      Treatment Charges: Mins Units   []  Modalities     [x]  Ther Exercise 42 3   []  Manual Therapy     []  Ther Activities     []  Aquatics     []  Neuromuscular     [] Vasocompression     [] Gait Training     [] Dry needling        [] 1 or 2 muscles [] 3 or more muscles     []  Other     Total Treatment time 42 3     Time In:  9024           Time Out: 1430    Electronically signed by:  Etelvina Capone PTA

## 2021-11-17 NOTE — PROGRESS NOTES
Subjective:      Patient ID: Niya Pedersen is a 39 y.o. female. Visit Information    Have you changed or started any medications since your last visit including any over-the-counter medicines, vitamins, or herbal medicines? no   Are you having any side effects from any of your medications? -  no  Have you stopped taking any of your medications? Is so, why? -  no    Have you seen any other physician or provider since your last visit? No  Have you had any other diagnostic tests since your last visit? No  Have you been seen in the emergency room and/or had an admission to a hospital since we last saw you? No  Have you had your routine dental cleaning in the past 6 months? no    Have you activated your [employer] account? If not, what are your barriers? Yes     Patient Care Team:  Natasha Ang MD as PCP - General (Family Medicine)  Natasha Ang MD as PCP - [facility] Provider    Medical History Review  Past Medical, Family, and Social History reviewed and does contribute to the patient presenting condition    Health Maintenance   Topic Date Due    Hepatitis C screen  Never done    Varicella vaccine (1 of 2 - 2-dose childhood series) Never done    HIV screen  Never done    DTaP/Tdap/Td vaccine (1 - Tdap) Never done    Flu vaccine (1) Never done    Lipid screen  08/30/2026    COVID-19 Vaccine  Completed    Hepatitis A vaccine  Aged Out    Hepatitis B vaccine  Aged Out    Hib vaccine  Aged Out    Meningococcal (ACWY) vaccine  Aged Out    Pneumococcal 0-64 years Vaccine  Aged Out     HPI    3year old female presents with management of HLD b12 deficiency vit d deficiency and POTS. Noted to have elevated cholesterol and has bene on diet measures. States she has been watching diet and lost some weight. Is compliant with b12 and vit d supplements and routine  medications and tolerates them well. States she was recently diagnosed with POTS and follows up with Dr. Joy Jimenez.  States she is on midodrin for low bp and keeps hydrated and the condition has been stable. Hx of migraine managed by Dr. Kishan Alva. Review of Systems   Constitutional: Negative for chills and fever. Respiratory: Negative for shortness of breath and wheezing. Cardiovascular: Negative for chest pain and leg swelling. Gastrointestinal: Negative for abdominal pain, nausea and vomiting. Neurological: Negative for dizziness, weakness and numbness. Psychiatric/Behavioral: Negative for agitation and behavioral problems. Objective:   Physical Exam  Vitals and nursing note reviewed. Constitutional:       General: She is not in acute distress. Appearance: Normal appearance. She is obese. HENT:      Nose: Nose normal.   Eyes:      Conjunctiva/sclera: Conjunctivae normal.   Cardiovascular:      Rate and Rhythm: Normal rate and regular rhythm. Heart sounds: Normal heart sounds. Pulmonary:      Effort: Pulmonary effort is normal. No respiratory distress. Breath sounds: Normal breath sounds. Abdominal:      Palpations: Abdomen is soft. Tenderness: There is no abdominal tenderness. Musculoskeletal:         General: Normal range of motion. Cervical back: Neck supple. Lymphadenopathy:      Cervical: No cervical adenopathy. Skin:     General: Skin is warm and dry. Neurological:      Mental Status: She is alert and oriented to person, place, and time. Cranial Nerves: No cranial nerve deficit. Psychiatric:         Mood and Affect: Mood normal.         Behavior: Behavior normal.         Assessment:      1. Mixed hyperlipidemia    2. B12 deficiency    3. Vitamin D deficiency    4.  POTS (postural orthostatic tachycardia syndrome)            Plan:      BP Readings from Last 3 Encounters:   11/17/21 100/74   10/19/21 (!) 120/105   10/19/21 119/76     /74   Pulse 84   Temp 97.2 °F (36.2 °C) (Infrared)   Ht 5' 5\" (1.651 m)   Wt 243 lb 12.8 oz (110.6 kg)   BMI 40.57 kg/m²   Lab Results Component Value Date    WBC 7.1 10/08/2021    HGB 13.4 10/08/2021    HCT 43.8 10/08/2021     10/08/2021    CHOL 196 08/30/2021    TRIG 76 08/30/2021    HDL 38 (L) 08/30/2021    ALT 15 10/08/2021    AST 17 10/08/2021     10/08/2021    K 3.9 10/08/2021     (H) 10/08/2021    CREATININE 0.79 10/08/2021    BUN 13 10/08/2021    CO2 21 10/08/2021    TSH 1.39 06/14/2021    INR 1.0 07/24/2015    LABA1C 5.3 10/28/2019     Lab Results   Component Value Date    CALCIUM 8.5 (L) 10/08/2021     Lab Results   Component Value Date    LDLCHOLESTEROL 143 (H) 08/30/2021         1. Mixed hyperlipidemia  - cont diet measures. - discussion with pt about his current diet and exercise habits, and personalized advice was provided regarding recommended lifestyle changes, diet and exercise. 2. B12 deficiency  - cont daily b12 supplements   - Vitamin B12; Future    3. Vitamin D deficiency  - cont weekly supplements . Consider increase to twice weekly if not improving  - Vitamin D 25 Hydroxy; Future    4. POTS (postural orthostatic tachycardia syndrome)  - stable. Cont current therapy . - follow up with cardiologist as scheduled   - Basic Metabolic Panel; Future        Requested Prescriptions      No prescriptions requested or ordered in this encounter       Medications Discontinued During This Encounter   Medication Reason    famotidine (PEPCID) 20 MG tablet LIST CLEANUP       Discussed use, benefit, and side effects of prescribed medications. Barriers to medication compliance addressed. All patient questions answered. Pt voiced understanding. Return in about 4 months (around 3/17/2022) for HLD, vit d deficiency b12 deficiency .

## 2021-11-18 PROBLEM — E78.2 MIXED HYPERLIPIDEMIA: Status: ACTIVE | Noted: 2021-11-18

## 2021-11-18 PROBLEM — E55.9 VITAMIN D DEFICIENCY: Status: ACTIVE | Noted: 2021-11-18

## 2021-11-18 PROBLEM — E53.8 B12 DEFICIENCY: Status: ACTIVE | Noted: 2021-11-18

## 2021-11-24 ENCOUNTER — HOSPITAL ENCOUNTER (OUTPATIENT)
Dept: PHYSICAL THERAPY | Age: 41
Setting detail: THERAPIES SERIES
Discharge: HOME | End: 2021-11-24
Payer: COMMERCIAL

## 2021-11-24 PROCEDURE — 97110 THERAPEUTIC EXERCISES: CPT

## 2021-11-24 PROCEDURE — 97112 NEUROMUSCULAR REEDUCATION: CPT

## 2021-11-24 NOTE — FLOWSHEET NOTE
[facility]   [facility]   Phone: [phone]   Fax: [phone]    Physical Therapy Daily Treatment Note      Date:  10/29/2021  Patient Name:  Catherine Carcamo    :  1980  MRN: [id number]  Physician: Gaston Diaz MD                          Insurance: [facility] 30 visits/year  Medical Diagnosis: (R) medial meniscus tear S83.241A               Rehab Codes: (R) knee pain M25.561  Onset date: surgery 10/19/21                Next Dr's appt.: 21  Visit Count: 3                           Cancel/No Show: 0/0    Subjective:  Pt reports improved overall symptoms at this time, making good progress with WB tolerance. Pain:  [x] Yes  [] No Location: Right knee   Pain Rating: (0-10 scale) 3/10  Pain altered Tx:  [x] No  [] Yes  Action:  Comments:      Objective:  Modalities: VASO- 15' knee sleeve, low compression, 40 degrees (NT)  Precautions: History of Seizures; Grand mal   Exercises:  Exercise Reps/ Time Weight/ Level Comments   Heel slide 15       Flexion over edge of table 15 x 5\"       Supine calf stretch 3 x 30\"       Supine hamstring stretch 3 x 15\"       Sidelying hip AB      LAQ  2x10 reps     SLR 2x10 reps R    Clamshells 2x10  R    3 way hip 15x     Standing heel toe raises 15x             Other:    Specific Instructions for next treatment: Continue to focus on knee ROM and progress strengthening per Protocol. Try Nustep next visit. Assessment: [x] Progressing toward goals. Began treatment with review of HEP and working towards full ROM of right knee. Able to achieve full knee extension after supine HS stretch. Denies any issues with exercises today. Does note overall fatigue in right LE at end of treatment. Denies need for vasocompression today. [] No change.      [] Other:    [x] Patient would continue to benefit from skilled physical therapy services in order to: improve mobility, improve strength, improve functional tolerance of ambulation, prolonged standing, and stairs    STG: (to be met in 6 treatments)  1. ? Pain:0-1  2. ? ROM: 0-90 heel slide, 105 degrees over edge of table  3. ? Strength: able to tolerate strengthening over edge of table 4/5 for knee flexion, extension, hip testing  4. ? Function: Able to ambulate without (A) device with minimal to no antalgic gait, able to ascend/descend 6\" stairs  5. Independent with Home Exercise Programs  LTG: (to be met in 12 treatments)  6. ? Pain:0/10  7. ? ROM: 0-110 heel slide  8. ? Strength: able to tolerate strengthening over edge of table 4+/5 for knee flexion, extension, hip testing, (B) squats past 70 degrees  9. ? Function: Able to ambulate without (A) device with minimal to no antalgic gait, able to ascend/descend 8\" stairs  10. Independent with Home Exercise Program            Patient goals: no pain    Pt. Education:  [x] Yes  [] No  [x] Reviewed Prior HEP/Ed  Method of Education: [x] Verbal  [x] Demo  [] Written  Comprehension of Education:  [x] Verbalizes understanding. [x] Demonstrates understanding. [] Needs review. [] Demonstrates/verbalizes HEP/Ed previously given. Plan: [x] Continue per plan of care.    [] Other:      Treatment Charges: Mins Units   []  Modalities     [x]  Ther Exercise 40 3   []  Manual Therapy     []  Ther Activities     []  Aquatics     []  Neuromuscular     [] Vasocompression     [] Gait Training     [] Dry needling        [] 1 or 2 muscles        [] 3 or more muscles     []  Other     Total Treatment time 40 3     Time In:  7349           Time Out: 0272    Electronically signed by:  Brodie Berman, PT

## 2021-12-01 ENCOUNTER — HOSPITAL ENCOUNTER (OUTPATIENT)
Dept: PHYSICAL THERAPY | Age: 41
Setting detail: THERAPIES SERIES
Discharge: HOME | End: 2021-12-01
Payer: COMMERCIAL

## 2021-12-01 PROCEDURE — 97110 THERAPEUTIC EXERCISES: CPT

## 2021-12-01 PROCEDURE — 97112 NEUROMUSCULAR REEDUCATION: CPT

## 2021-12-03 ENCOUNTER — HOSPITAL ENCOUNTER (OUTPATIENT)
Age: 41
Discharge: HOME | End: 2021-12-03
Payer: COMMERCIAL

## 2021-12-03 ENCOUNTER — HOSPITAL ENCOUNTER (OUTPATIENT)
Dept: PHYSICAL THERAPY | Age: 41
Setting detail: THERAPIES SERIES
Discharge: HOME | End: 2021-12-03
Payer: COMMERCIAL

## 2021-12-03 DIAGNOSIS — E53.8 B12 DEFICIENCY: ICD-10-CM

## 2021-12-03 DIAGNOSIS — E55.9 VITAMIN D DEFICIENCY: ICD-10-CM

## 2021-12-03 DIAGNOSIS — G90.A POTS (POSTURAL ORTHOSTATIC TACHYCARDIA SYNDROME): ICD-10-CM

## 2021-12-03 LAB
ANION GAP SERPL CALCULATED.3IONS-SCNC: 8 MMOL/L (ref 9–17)
BUN BLDV-MCNC: 11 MG/DL (ref 6–20)
BUN/CREAT BLD: ABNORMAL (ref 9–20)
CALCIUM SERPL-MCNC: 9 MG/DL (ref 8.6–10.4)
CHLORIDE BLD-SCNC: 110 MMOL/L (ref 98–107)
CO2: 24 MMOL/L (ref 20–31)
CREAT SERPL-MCNC: 0.96 MG/DL (ref 0.5–0.9)
GFR AFRICAN AMERICAN: >60 ML/MIN
GFR NON-AFRICAN AMERICAN: >60 ML/MIN
GFR SERPL CREATININE-BSD FRML MDRD: ABNORMAL ML/MIN/{1.73_M2}
GFR SERPL CREATININE-BSD FRML MDRD: ABNORMAL ML/MIN/{1.73_M2}
GLUCOSE BLD-MCNC: 82 MG/DL (ref 70–99)
POTASSIUM SERPL-SCNC: 4 MMOL/L (ref 3.7–5.3)
SODIUM BLD-SCNC: 142 MMOL/L (ref 135–144)
VITAMIN B-12: 549 PG/ML (ref 232–1245)
VITAMIN D 25-HYDROXY: 21.2 NG/ML (ref 30–100)

## 2021-12-03 PROCEDURE — 97110 THERAPEUTIC EXERCISES: CPT

## 2021-12-03 PROCEDURE — 82607 VITAMIN B-12: CPT

## 2021-12-03 PROCEDURE — 80048 BASIC METABOLIC PNL TOTAL CA: CPT

## 2021-12-03 PROCEDURE — 82306 VITAMIN D 25 HYDROXY: CPT

## 2021-12-03 PROCEDURE — 36415 COLL VENOUS BLD VENIPUNCTURE: CPT

## 2021-12-06 DIAGNOSIS — E55.9 VITAMIN D DEFICIENCY: ICD-10-CM

## 2021-12-06 RX ORDER — ERGOCALCIFEROL 1.25 MG/1
CAPSULE ORAL
Qty: 24 CAPSULE | Refills: 0 | Status: SHIPPED | OUTPATIENT
Start: 2021-12-06

## 2021-12-08 ENCOUNTER — HOSPITAL ENCOUNTER (OUTPATIENT)
Dept: PHYSICAL THERAPY | Age: 41
Setting detail: THERAPIES SERIES
Discharge: HOME | End: 2021-12-08
Payer: COMMERCIAL

## 2021-12-08 PROCEDURE — 97110 THERAPEUTIC EXERCISES: CPT

## 2021-12-08 NOTE — FLOWSHEET NOTE
[facility]   [address]   Phone: [phone]   Fax: [phone]    Physical Therapy Daily Treatment Note    Date:  2021  Patient Name:  Diane Hector    :  1980  MRN: [id number]  Physician: Karan Wiseman MD                          Insurance: [facility] 30 visits/year  Medical Diagnosis: (R) medial meniscus tear S83.241A               Rehab Codes: (R) knee pain M25.561  Onset date: surgery 10/19/21                Next Dr's appt.: 21  Visit Count:                            Cancel/No Show: 0/0    Subjective:  Pt states her whole body hurts upon arrival due to having a seizure last night. Feels her estrada has been flaring up since the seizure.   Pain:  [x] Yes  [] No Location: Right knee, entire body   Pain Rating: (0-10 scale) 8/10  Pain altered Tx:  [] No  [x] Yes  Action: decreased intensity of exercises   Comments:      Objective:  Modalities: VASO- 15' knee sleeve, low compression, 40 degrees (NT)  Precautions: History of Seizures; Grand mal   Exercises:   Exercise Reps/ Time Weight/ Level Comments Completed today   Nustep  5' L3  x          Heel slide 15     x   Flexion over edge of table 15 x 5\"        Supine calf stretch 3 x 30\"        Supine hamstring stretch 3 x 15\"        Sidelying hip AB x15 reps   x   LAQ  2x10 reps      SLR 2x10 reps R only  x   Clamshells 2x10  R only  x          Standing        gastroc stretch on SB  3x30\"   x   HS stretch on step  3x30\"ea   x   Heel raises  20x    x   3 way hip  10xea Red  bilat  x   Step ups  x10 8\"  x   Side steps  x10 8\"  x   Frw Lunges  10x       Side lunges  10x      Squats  2x10    60 degree flexion x   TKE 20x Red     Total Gym squats  20x   90 degrees flexion  x   Heel tap 10x 2\" Painful    Squat w/ weighted ball x20 reps #4            Other:     Specific Instructions for next treatment: Continue to focus on knee ROM and progress strengthening per Protocol. Assessment: [] Progressing toward goals. [] No change. [x] Other:  Decreased intensity of exercises due to increased overall pain and recent seizure. Continued with stretches and added mat level exercises back in for hip strengthening. Notes increased soreness will all exercises and extra rest needed between certain exercises. [x] Patient would continue to benefit from skilled physical therapy services in order to: improve mobility, improve strength, improve functional tolerance of ambulation, prolonged standing, and stairs    STG: (to be met in 6 treatments)  1. ? Pain:0-1  2. ? ROM: 0-90 heel slide, 105 degrees over edge of table  3. ? Strength: able to tolerate strengthening over edge of table 4/5 for knee flexion, extension, hip testing  4. ? Function: Able to ambulate without (A) device with minimal to no antalgic gait, able to ascend/descend 6\" stairs  5. Independent with Home Exercise Programs  LTG: (to be met in 12 treatments)  6. ? Pain:0/10  7. ? ROM: 0-110 heel slide  8. ? Strength: able to tolerate strengthening over edge of table 4+/5 for knee flexion, extension, hip testing, (B) squats past 70 degrees  9. ? Function: Able to ambulate without (A) device with minimal to no antalgic gait, able to ascend/descend 8\" stairs  10. Independent with Home Exercise Program            Patient goals: no pain    Pt. Education:  [x] Yes  [] No  [x] Reviewed Prior HEP/Ed  Method of Education: [x] Verbal  [x] Demo  [] Written  Comprehension of Education:  [x] Verbalizes understanding. [x] Demonstrates understanding. [] Needs review. [] Demonstrates/verbalizes HEP/Ed previously given. Plan: [x] Continue per plan of care.    [] Other:      Treatment Charges: Mins Units   []  Modalities     [x]  Ther Exercise 42 3   []  Manual Therapy     []  Ther Activities     []  Aquatics     []  Neuromuscular     [] Vasocompression     [] Gait Training     [] Dry needling        [] 1 or 2 muscles [] 3 or more muscles     []  Other     Total Treatment time 42 3     Time In:  9040           Time Out: 1430    Electronically signed by:  Noreen Renee PTA

## 2021-12-13 ENCOUNTER — HOSPITAL ENCOUNTER (OUTPATIENT)
Dept: PHYSICAL THERAPY | Age: 41
Setting detail: THERAPIES SERIES
End: 2021-12-13
Payer: COMMERCIAL

## 2021-12-20 ENCOUNTER — HOSPITAL ENCOUNTER (OUTPATIENT)
Dept: PHYSICAL THERAPY | Age: 41
Setting detail: THERAPIES SERIES
Discharge: HOME | End: 2021-12-20
Payer: COMMERCIAL

## 2021-12-20 PROCEDURE — 97110 THERAPEUTIC EXERCISES: CPT

## 2021-12-20 NOTE — FLOWSHEET NOTE
509 [facility]   [facility] [address]   Phone: [phone]   Fax: [phone]    Physical Therapy Daily Treatment Note    Date:  2021  Patient Name:  Ashley Hodge    :  1980  MRN: [id number]  Physician: Richa Mabry MD                          Insurance: [facility] 30 visits/year  Medical Diagnosis: (R) medial meniscus tear S83.241A               Rehab Codes: (R) knee pain M25.561  Onset date: surgery 10/19/21                Next Dr's appt.: N/A  Visit Count:                            Cancel/No Show: 0/0    Subjective:  Pt states knee feeling better today. States still uncomfortable to kneel on and put all her weight on RLE. Denies any pain upon arrival.    Pain:  [] Yes  [x] No Location: Right knee   Pain Rating: (0-10 scale) 0/10  Pain altered Tx:  [x] No  [] Yes  Action:    Comments:      Objective:  Modalities: VASO- 15' knee sleeve, low compression, 40 degrees (Not Today)  Precautions: History of Seizures; Grand mal   Exercises:   Exercise Reps/ Time Weight/ Level Comments Completed today   Nustep  5' L3  x          Heel slide 15        Flexion over edge of table 15 x 5\"        Supine calf stretch 3 x 30\"        Supine hamstring stretch 3 x 15\"        Sidelying hip AB x15 reps      LAQ  2x10 reps      SLR 2x10 reps R only     Clamshells 2x10  R only            Standing        gastroc stretch on SB  3x30\"   x   HS stretch on step  3x30\"ea   x   Heel raises  20x    x   3 way hip  10xea Red  bilat  x   Step ups  x15 8\"  x   Side steps  x15 8\"  x   Frw Lunges  20x    x   Side lunges  20x   x   Squats  2x10    60 degree flexion x   TKE 20x Red  x   Total Gym squats  20x   90 degrees flexion  x   Heel tap 10x 2\" Painful    Squat w/ weighted ball x20 reps #4  x          Other:     Specific Instructions for next treatment: Continue to focus on knee ROM and progress strengthening per Protocol.     Assessment: [x] Progressing toward goals. Supine AROM R knee- 0-127 degrees. Continues to note soreness/discomfort with SLS on RLE. Notes more fatigue at end of treatment due to missing last week. Cueing to slow pace of squats and focus on form. Also cueing for postural awareness during 3-way hip exercises. [] No change. [] Other:      [x] Patient would continue to benefit from skilled physical therapy services in order to: improve mobility, improve strength, improve functional tolerance of ambulation, prolonged standing, and stairs    STG: (to be met in 6 treatments)  1. ? Pain:0-1  2. ? ROM: 0-90 heel slide, 105 degrees over edge of table  3. ? Strength: able to tolerate strengthening over edge of table 4/5 for knee flexion, extension, hip testing  4. ? Function: Able to ambulate without (A) device with minimal to no antalgic gait, able to ascend/descend 6\" stairs  5. Independent with Home Exercise Programs  LTG: (to be met in 12 treatments)  6. ? Pain:0/10  7. ? ROM: 0-110 heel slide  8. ? Strength: able to tolerate strengthening over edge of table 4+/5 for knee flexion, extension, hip testing, (B) squats past 70 degrees  9. ? Function: Able to ambulate without (A) device with minimal to no antalgic gait, able to ascend/descend 8\" stairs  10. Independent with Home Exercise Program            Patient goals: no pain    Pt. Education:  [x] Yes  [] No  [x] Reviewed Prior HEP/Ed  Method of Education: [x] Verbal  [x] Demo  [] Written  Comprehension of Education:  [x] Verbalizes understanding. [x] Demonstrates understanding. [] Needs review. [] Demonstrates/verbalizes HEP/Ed previously given. Plan: [x] Continue per plan of care.    [] Other:      Treatment Charges: Mins Units   []  Modalities     [x]  Ther Exercise 33 2   []  Manual Therapy     []  Ther Activities     []  Aquatics     []  Neuromuscular     [] Vasocompression     [] Gait Training     [] Dry needling        [] 1 or 2 muscles        [] 3 or more muscles     [] Other     Total Treatment time 33 2     Time In:  4726           Time Out: 1412    Electronically signed by:  Ilda Silva PTA

## 2021-12-22 ENCOUNTER — HOSPITAL ENCOUNTER (OUTPATIENT)
Dept: PHYSICAL THERAPY | Age: 41
Setting detail: THERAPIES SERIES
Discharge: HOME | End: 2021-12-22
Payer: COMMERCIAL

## 2021-12-22 PROCEDURE — 97110 THERAPEUTIC EXERCISES: CPT

## 2021-12-22 PROCEDURE — 97112 NEUROMUSCULAR REEDUCATION: CPT

## 2021-12-27 ENCOUNTER — HOSPITAL ENCOUNTER (EMERGENCY)
Age: 41
Discharge: HOME | End: 2021-12-27
Attending: EMERGENCY MEDICINE
Payer: COMMERCIAL

## 2021-12-27 ENCOUNTER — APPOINTMENT (OUTPATIENT)
Dept: CT IMAGING | Age: 41
End: 2021-12-27
Payer: COMMERCIAL

## 2021-12-27 VITALS
TEMPERATURE: 97.5 F | BODY MASS INDEX: 40.82 KG/M2 | DIASTOLIC BLOOD PRESSURE: 78 MMHG | OXYGEN SATURATION: 97 % | RESPIRATION RATE: 16 BRPM | WEIGHT: 245 LBS | SYSTOLIC BLOOD PRESSURE: 105 MMHG | HEIGHT: 65 IN | HEART RATE: 78 BPM

## 2021-12-27 DIAGNOSIS — N10 ACUTE PYELONEPHRITIS: ICD-10-CM

## 2021-12-27 DIAGNOSIS — N30.01 ACUTE CYSTITIS WITH HEMATURIA: Primary | ICD-10-CM

## 2021-12-27 LAB
-: ABNORMAL
ABSOLUTE EOS #: 0.2 K/UL (ref 0–0.4)
ABSOLUTE IMMATURE GRANULOCYTE: ABNORMAL K/UL (ref 0–0.3)
ABSOLUTE LYMPH #: 1.9 K/UL (ref 1–4.8)
ABSOLUTE MONO #: 0.6 K/UL (ref 0.1–1.3)
ALBUMIN SERPL-MCNC: 4.3 G/DL (ref 3.5–5.2)
ALBUMIN/GLOBULIN RATIO: ABNORMAL (ref 1–2.5)
ALP BLD-CCNC: 88 U/L (ref 35–104)
ALT SERPL-CCNC: 13 U/L (ref 5–33)
AMORPHOUS: ABNORMAL
ANION GAP SERPL CALCULATED.3IONS-SCNC: 10 MMOL/L (ref 9–17)
AST SERPL-CCNC: 14 U/L
BACTERIA: ABNORMAL
BASOPHILS # BLD: 0 % (ref 0–2)
BASOPHILS ABSOLUTE: 0 K/UL (ref 0–0.2)
BILIRUB SERPL-MCNC: 0.29 MG/DL (ref 0.3–1.2)
BILIRUBIN URINE: NEGATIVE
BUN BLDV-MCNC: 9 MG/DL (ref 6–20)
BUN/CREAT BLD: ABNORMAL (ref 9–20)
CALCIUM SERPL-MCNC: 8.8 MG/DL (ref 8.6–10.4)
CASTS UA: ABNORMAL /LPF
CHLORIDE BLD-SCNC: 108 MMOL/L (ref 98–107)
CO2: 23 MMOL/L (ref 20–31)
COLOR: YELLOW
COMMENT UA: ABNORMAL
CREAT SERPL-MCNC: 0.84 MG/DL (ref 0.5–0.9)
CRYSTALS, UA: ABNORMAL /HPF
DIFFERENTIAL TYPE: ABNORMAL
EOSINOPHILS RELATIVE PERCENT: 2 % (ref 0–4)
EPITHELIAL CELLS UA: ABNORMAL /HPF
GFR AFRICAN AMERICAN: >60 ML/MIN
GFR NON-AFRICAN AMERICAN: >60 ML/MIN
GFR SERPL CREATININE-BSD FRML MDRD: ABNORMAL ML/MIN/{1.73_M2}
GFR SERPL CREATININE-BSD FRML MDRD: ABNORMAL ML/MIN/{1.73_M2}
GLUCOSE BLD-MCNC: 77 MG/DL (ref 70–99)
GLUCOSE URINE: NEGATIVE
HCT VFR BLD CALC: 43.5 % (ref 36–46)
HEMOGLOBIN: 14.4 G/DL (ref 12–16)
IMMATURE GRANULOCYTES: ABNORMAL %
KETONES, URINE: NEGATIVE
LEUKOCYTE ESTERASE, URINE: ABNORMAL
LYMPHOCYTES # BLD: 22 % (ref 24–44)
MCH RBC QN AUTO: 28.4 PG (ref 26–34)
MCHC RBC AUTO-ENTMCNC: 33.1 G/DL (ref 31–37)
MCV RBC AUTO: 86 FL (ref 80–100)
MONOCYTES # BLD: 7 % (ref 1–7)
MUCUS: ABNORMAL
NITRITE, URINE: NEGATIVE
NRBC AUTOMATED: ABNORMAL PER 100 WBC
OTHER OBSERVATIONS UA: ABNORMAL
PDW BLD-RTO: 13.6 % (ref 11.5–14.9)
PH UA: 5.5 (ref 5–8)
PLATELET # BLD: 263 K/UL (ref 150–450)
PLATELET ESTIMATE: ABNORMAL
PMV BLD AUTO: 8 FL (ref 6–12)
POTASSIUM SERPL-SCNC: 3.8 MMOL/L (ref 3.7–5.3)
PROTEIN UA: NEGATIVE
RBC # BLD: 5.06 M/UL (ref 4–5.2)
RBC # BLD: ABNORMAL 10*6/UL
RBC UA: ABNORMAL /HPF
RENAL EPITHELIAL, UA: ABNORMAL /HPF
SEG NEUTROPHILS: 69 % (ref 36–66)
SEGMENTED NEUTROPHILS ABSOLUTE COUNT: 5.8 K/UL (ref 1.3–9.1)
SODIUM BLD-SCNC: 141 MMOL/L (ref 135–144)
SPECIFIC GRAVITY UA: 1.01 (ref 1–1.03)
TOTAL PROTEIN: 7.2 G/DL (ref 6.4–8.3)
TRICHOMONAS: ABNORMAL
TURBIDITY: ABNORMAL
URINE HGB: ABNORMAL
UROBILINOGEN, URINE: NORMAL
WBC # BLD: 8.5 K/UL (ref 3.5–11)
WBC # BLD: ABNORMAL 10*3/UL
WBC UA: ABNORMAL /HPF
YEAST: ABNORMAL

## 2021-12-27 PROCEDURE — 96374 THER/PROPH/DIAG INJ IV PUSH: CPT

## 2021-12-27 PROCEDURE — 36415 COLL VENOUS BLD VENIPUNCTURE: CPT

## 2021-12-27 PROCEDURE — 74176 CT ABD & PELVIS W/O CONTRAST: CPT

## 2021-12-27 PROCEDURE — 6360000002 HC RX W HCPCS: Performed by: EMERGENCY MEDICINE

## 2021-12-27 PROCEDURE — 6370000000 HC RX 637 (ALT 250 FOR IP): Performed by: EMERGENCY MEDICINE

## 2021-12-27 PROCEDURE — 99284 EMERGENCY DEPT VISIT MOD MDM: CPT

## 2021-12-27 PROCEDURE — 87086 URINE CULTURE/COLONY COUNT: CPT

## 2021-12-27 PROCEDURE — 2580000003 HC RX 258: Performed by: EMERGENCY MEDICINE

## 2021-12-27 PROCEDURE — 81001 URINALYSIS AUTO W/SCOPE: CPT

## 2021-12-27 PROCEDURE — 80053 COMPREHEN METABOLIC PANEL: CPT

## 2021-12-27 PROCEDURE — 85025 COMPLETE CBC W/AUTO DIFF WBC: CPT

## 2021-12-27 PROCEDURE — 96375 TX/PRO/DX INJ NEW DRUG ADDON: CPT

## 2021-12-27 RX ORDER — ACETAMINOPHEN 500 MG
1000 TABLET ORAL EVERY 6 HOURS PRN
Qty: 60 TABLET | Refills: 0 | Status: SHIPPED | OUTPATIENT
Start: 2021-12-27 | End: 2022-03-21 | Stop reason: ALTCHOICE

## 2021-12-27 RX ORDER — ONDANSETRON 2 MG/ML
8 INJECTION INTRAMUSCULAR; INTRAVENOUS ONCE
Status: COMPLETED | OUTPATIENT
Start: 2021-12-27 | End: 2021-12-27

## 2021-12-27 RX ORDER — FENTANYL CITRATE 50 UG/ML
50 INJECTION, SOLUTION INTRAMUSCULAR; INTRAVENOUS ONCE
Status: COMPLETED | OUTPATIENT
Start: 2021-12-27 | End: 2021-12-27

## 2021-12-27 RX ORDER — ONDANSETRON 4 MG/1
4 TABLET, ORALLY DISINTEGRATING ORAL EVERY 8 HOURS PRN
Qty: 20 TABLET | Refills: 0 | Status: SHIPPED | OUTPATIENT
Start: 2021-12-27 | End: 2022-07-20 | Stop reason: ALTCHOICE

## 2021-12-27 RX ORDER — KETOROLAC TROMETHAMINE 30 MG/ML
15 INJECTION, SOLUTION INTRAMUSCULAR; INTRAVENOUS ONCE
Status: COMPLETED | OUTPATIENT
Start: 2021-12-27 | End: 2021-12-27

## 2021-12-27 RX ORDER — SULFAMETHOXAZOLE AND TRIMETHOPRIM 800; 160 MG/1; MG/1
1 TABLET ORAL ONCE
Status: COMPLETED | OUTPATIENT
Start: 2021-12-27 | End: 2021-12-27

## 2021-12-27 RX ORDER — 0.9 % SODIUM CHLORIDE 0.9 %
1000 INTRAVENOUS SOLUTION INTRAVENOUS ONCE
Status: COMPLETED | OUTPATIENT
Start: 2021-12-27 | End: 2021-12-27

## 2021-12-27 RX ORDER — SULFAMETHOXAZOLE AND TRIMETHOPRIM 800; 160 MG/1; MG/1
1 TABLET ORAL 2 TIMES DAILY
Qty: 20 TABLET | Refills: 0 | Status: SHIPPED | OUTPATIENT
Start: 2021-12-27 | End: 2022-01-06

## 2021-12-27 RX ORDER — NAPROXEN 500 MG/1
500 TABLET ORAL 2 TIMES DAILY
Qty: 20 TABLET | Refills: 0 | Status: SHIPPED | OUTPATIENT
Start: 2021-12-27 | End: 2022-06-16

## 2021-12-27 RX ADMIN — SULFAMETHOXAZOLE AND TRIMETHOPRIM 1 TABLET: 800; 160 TABLET ORAL at 16:44

## 2021-12-27 RX ADMIN — KETOROLAC TROMETHAMINE 15 MG: 30 INJECTION, SOLUTION INTRAMUSCULAR; INTRAVENOUS at 15:40

## 2021-12-27 RX ADMIN — ONDANSETRON 8 MG: 2 INJECTION INTRAMUSCULAR; INTRAVENOUS at 15:35

## 2021-12-27 RX ADMIN — FENTANYL CITRATE 50 MCG: 0.05 INJECTION, SOLUTION INTRAMUSCULAR; INTRAVENOUS at 15:34

## 2021-12-27 RX ADMIN — SODIUM CHLORIDE 1000 ML: 9 INJECTION, SOLUTION INTRAVENOUS at 15:29

## 2021-12-27 ASSESSMENT — ENCOUNTER SYMPTOMS
NAUSEA: 1
ABDOMINAL PAIN: 1
VOMITING: 0

## 2021-12-27 ASSESSMENT — PAIN SCALES - GENERAL
PAINLEVEL_OUTOF10: 10
PAINLEVEL_OUTOF10: 10

## 2021-12-27 NOTE — ED PROVIDER NOTES
Mixed hyperlipidemia E78.2    B12 deficiency E53.8    Vitamin D deficiency E55.9     SURGICAL HISTORY       Past Surgical History:   Procedure Laterality Date    APPENDECTOMY      CHOLECYSTECTOMY      CYSTOSCOPY  06/068/2016    lt ureteroscopy with holmium laser lithotripsy and lt ureteral stent    ENDOMETRIAL ABLATION      HYSTERECTOMY      partial    KNEE ARTHROSCOPY Right 10/19/2021    KNEE ARTHROSCOPY, PARTIAL MEDIAL MENISECTOMY, PARTIAL LATERAL RELEASE    KNEE ARTHROSCOPY Right 10/19/2021    KNEE ARTHROSCOPY, PARTIAL MEDIAL MENISECTOMY, PARTIAL LATERAL RELEASE  (3080 TABLE, SUPINE) performed by Ilda Flores DO at [address] [place] [address]       Previous Medications    BIOTIN 5000 PO    Take by mouth    CETIRIZINE (ZYRTEC) 10 MG TABLET    Take 10 mg by mouth daily     CYANOCOBALAMIN ([facility] VITAMIN B12) 1000 MCG TABLET    Take 1 tablet by mouth daily    FLUTICASONE PROPIONATE (FLONASE ALLERGY RELIEF NA)    by Nasal route    MELOXICAM (MOBIC) 7.5 MG TABLET    Take 2 tablets by mouth daily With food    MIDODRINE (PROAMATINE) 2.5 MG TABLET    Take 5 mg by mouth 3 times daily    RIZATRIPTAN (MAXALT) 10 MG TABLET    Take 1 tablet by mouth once as needed for Migraine May repeat in 2 hours if needed    TOPIRAMATE (TOPAMAX) 100 MG TABLET    Take 1 1/2 po bid    VITAMIN D (ERGOCALCIFEROL) 1.25 MG (68988 UT) CAPS CAPSULE    Take one cap po twice weekly for 3 months, then go back to once weekly     ALLERGIES     is allergic to dilaudid [hydromorphone hcl] and other. FAMILY HISTORY     She indicated that her mother is alive.      SOCIAL HISTORY       Social History     Tobacco Use    Smoking status: Never Smoker    Smokeless tobacco: Never Used   Vaping Use    Vaping Use: Never used   Substance Use Topics    Alcohol use: No    Drug use: No     PHYSICAL EXAM     INITIAL VITALS: /89   Pulse 78   Temp 97.5 °F (36.4 °C) (Temporal)   Resp 16   Ht 5' 5\" (1.651 m)   Wt 245 lb (111.1 kg) SpO2 100%   BMI 40.77 kg/m²    Physical Exam  Constitutional:       General: She is not in acute distress. Appearance: Normal appearance. She is well-developed. She is not diaphoretic. HENT:      Head: Normocephalic and atraumatic. Right Ear: External ear normal.      Left Ear: External ear normal.      Nose: Nose normal. No congestion. Mouth/Throat:      Mouth: Mucous membranes are moist.      Pharynx: Oropharynx is clear. Eyes:      General:         Right eye: No discharge. Left eye: No discharge. Conjunctiva/sclera: Conjunctivae normal.      Pupils: Pupils are equal, round, and reactive to light. Neck:      Trachea: No tracheal deviation. Cardiovascular:      Rate and Rhythm: Normal rate and regular rhythm. Pulses: Normal pulses. Heart sounds: Normal heart sounds. Pulmonary:      Effort: Pulmonary effort is normal. No respiratory distress. Breath sounds: Normal breath sounds. No stridor. No wheezing or rales. Abdominal:      Palpations: Abdomen is soft. Tenderness: There is no abdominal tenderness. There is no guarding or rebound. Musculoskeletal:         General: No tenderness or deformity. Normal range of motion. Cervical back: Normal range of motion and neck supple. Comments: bilateral cva tenderness, no rash   Skin:     General: Skin is warm and dry. Capillary Refill: Capillary refill takes less than 2 seconds. Findings: No erythema or rash. Neurological:      General: No focal deficit present. Mental Status: She is alert and oriented to person, place, and time. Cranial Nerves: No cranial nerve deficit. Coordination: Coordination normal.   Psychiatric:         Mood and Affect: Mood normal.         Behavior: Behavior normal.         Thought Content:  Thought content normal.         Judgment: Judgment normal.         MEDICAL DECISION MAKING:   dw patient lab and ct findings  Do not see stone in ureter  Likely mild pyelonephritis  dw her the liver mass, she states it has already been biopsied, is not cancer, and is nonoperative  Discussed with patient anticipatory guidance, discharge instructions, follow up PCP 24 hours  rx bactrim, naprosyn, tylenol, zofran  Do not suspect sepsis           Procedures    DIAGNOSTIC RESULTS     RADIOLOGY:All plain film, CT, MRI, and formal ultrasound images (except ED bedside ultrasound) are read by the radiologist, see reports below, unless otherwisenoted in MDM or here. CT ABDOMEN PELVIS WO CONTRAST Additional Contrast? None   Final Result   *Punctate nonobstructing calculus lower pole right kidney. No evidence of   obstructive uropathy or other acute process. *Few incidental and chronic/postsurgical findings as above including   hypodense mass in the anterior right hepatic lobe as measured above which may   represent hemangioma (possibly obscured on prior CT examination due to   presence of fatty infiltration), consider further assessment with non   emergent/outpatient liver protocol MRI as clinically warranted. LABS: All lab results were reviewed by myself, and all abnormals are listed below.   Labs Reviewed   CBC WITH AUTO DIFFERENTIAL - Abnormal; Notable for the following components:       Result Value    Seg Neutrophils 69 (*)     Lymphocytes 22 (*)     All other components within normal limits   COMPREHENSIVE METABOLIC PANEL - Abnormal; Notable for the following components:    Chloride 108 (*)     Total Bilirubin 0.29 (*)     All other components within normal limits   URINE RT REFLEX TO CULTURE - Abnormal; Notable for the following components:    Turbidity UA Cloudy (*)     Urine Hgb LARGE (*)     Leukocyte Esterase, Urine LARGE (*)     All other components within normal limits   MICROSCOPIC URINALYSIS       EMERGENCY DEPARTMENTCOURSE:         Vitals:    Vitals:    12/27/21 1116   BP: 133/89   Pulse: 78   Resp: 16   Temp: 97.5 °F (36.4 °C)   TempSrc: Temporal   SpO2: 100% Weight: 245 lb (111.1 kg)   Height: 5' 5\" (1.651 m)       The patient was given the following medications while in the emergency department:  Orders Placed This Encounter   Medications    ketorolac (TORADOL) injection 15 mg    fentaNYL (SUBLIMAZE) injection 50 mcg    0.9 % sodium chloride bolus    ondansetron (ZOFRAN) injection 8 mg    sulfamethoxazole-trimethoprim (BACTRIM DS;SEPTRA DS) 800-160 MG per tablet 1 tablet     Order Specific Question:   Antimicrobial Indications     Answer:   Urinary Tract Infection    sulfamethoxazole-trimethoprim (BACTRIM DS) 800-160 MG per tablet     Sig: Take 1 tablet by mouth 2 times daily for 10 days     Dispense:  20 tablet     Refill:  0    acetaminophen (TYLENOL) 500 MG tablet     Sig: Take 2 tablets by mouth every 6 hours as needed for Pain     Dispense:  60 tablet     Refill:  0    naproxen (NAPROSYN) 500 MG tablet     Sig: Take 1 tablet by mouth 2 times daily     Dispense:  20 tablet     Refill:  0    ondansetron (ZOFRAN ODT) 4 MG disintegrating tablet     Sig: Take 1 tablet by mouth every 8 hours as needed for Nausea or Vomiting     Dispense:  20 tablet     Refill:  0       FINAL IMPRESSION      1. Acute cystitis with hematuria    2. Acute pyelonephritis     3.   Liver mass     DISPOSITION/PLAN   DISPOSITION Decision To Discharge 12/27/2021 03:55:00 PM      PATIENT REFERRED TO:  Mar Godoy MD  [address] [place] [address] A  [address]    Schedule an appointment as soon as possible for a visit in 2 days      DISCHARGE MEDICATIONS:  New Prescriptions    ACETAMINOPHEN (TYLENOL) 500 MG TABLET    Take 2 tablets by mouth every 6 hours as needed for Pain    NAPROXEN (NAPROSYN) 500 MG TABLET    Take 1 tablet by mouth 2 times daily    ONDANSETRON (ZOFRAN ODT) 4 MG DISINTEGRATING TABLET    Take 1 tablet by mouth every 8 hours as needed for Nausea or Vomiting    SULFAMETHOXAZOLE-TRIMETHOPRIM (BACTRIM DS) 800-160 MG PER TABLET    Take 1 tablet by mouth 2 times daily for 10 days     The care is provided during an unprecedented national emergency due to the novel coronavirus, COVID 19.   Jeremi Franco MD  Attending Emergency Physician                    Jeremi Franco MD  12/27/21 0572

## 2021-12-27 NOTE — ED TRIAGE NOTES
Mode of arrival (squad #, walk in, police, etc) : walk in        Chief complaint(s): hematuria        Arrival Note (brief scenario, treatment PTA, etc). : Pt with hematuria and flank pain x2 days. Pt with hx of kidney stones. C= \"Have you ever felt that you should Cut down on your drinking? \"  No  A= \"Have people Annoyed you by criticizing your drinking? \"  No  G= \"Have you ever felt bad or Guilty about your drinking? \"  No  E= \"Have you ever had a drink as an Eye-opener first thing in the morning to steady your nerves or to help a hangover? \"  No      Deferred []      Reason for deferring: N/A    *If yes to two or more: probable alcohol abuse. *

## 2021-12-28 ENCOUNTER — TELEPHONE (OUTPATIENT)
Dept: FAMILY MEDICINE CLINIC | Age: 41
End: 2021-12-28

## 2021-12-28 LAB
CULTURE: NORMAL
Lab: NORMAL
SPECIMEN DESCRIPTION: NORMAL

## 2021-12-28 NOTE — TELEPHONE ENCOUNTER
Spoke with patient. Made appointment for Thursday, 12-30-21 with Bianca Bill.      ----- Message from [facility] sent at 12/27/2021  5:05 PM EST -----  Subject: Appointment Request    Reason for Call: Routine ED Follow Up Visit    QUESTIONS  Type of Appointment? Established Patient  Reason for appointment request? Available appointments did not meet   patient need  Additional Information for Provider? [facility] [address] [place] [address] ED on   12/27. She was diagnosed with kidney stone, kidney infection and UTI. She   needs a follow up appt by 12/29. Please call her to schedule.   ---------------------------------------------------------------------------  --------------  CALL BACK INFO  What is the best way for the office to contact you? OK to leave message on   [employer], OK to respond with electronic message via [facility] portal (only   for patients who have registered [facility] account)  Preferred Call Back Phone Number? [phone]  ---------------------------------------------------------------------------  --------------  SCRIPT ANSWERS  Relationship to Patient? Self  (Patient requests to see provider urgently. )? No  Do you have any questions for your primary care provider that need to be   answered prior to your appointment? No  Have you been diagnosed with, awaiting test results for, or told that you   are suspected of having COVID-19 (Coronavirus)? (If patient has tested   negative or was tested as a requirement for work, school, or travel and   not based on symptoms, answer no)? No  Within the past two weeks have you developed any of the following symptoms   (answer no if symptoms have been present longer than 2 weeks or began   more than 2 weeks ago)?  Fever or Chills, Cough, Shortness of breath or   difficulty breathing, Loss of taste or smell, Sore throat, Nasal   congestion, Sneezing or runny nose, Fatigue or generalized body aches   (answer no if pain is specific to a body part e.g. back pain), Diarrhea,

## 2021-12-30 ENCOUNTER — OFFICE VISIT (OUTPATIENT)
Dept: FAMILY MEDICINE CLINIC | Age: 41
End: 2021-12-30
Payer: COMMERCIAL

## 2021-12-30 VITALS
SYSTOLIC BLOOD PRESSURE: 118 MMHG | DIASTOLIC BLOOD PRESSURE: 82 MMHG | HEIGHT: 65 IN | OXYGEN SATURATION: 99 % | BODY MASS INDEX: 40.82 KG/M2 | WEIGHT: 245 LBS | HEART RATE: 67 BPM | TEMPERATURE: 97.2 F

## 2021-12-30 DIAGNOSIS — R11.0 NAUSEA: ICD-10-CM

## 2021-12-30 DIAGNOSIS — R10.9 LEFT FLANK PAIN: ICD-10-CM

## 2021-12-30 DIAGNOSIS — N30.01 ACUTE CYSTITIS WITH HEMATURIA: ICD-10-CM

## 2021-12-30 DIAGNOSIS — N20.0 NEPHROLITHIASIS: Primary | ICD-10-CM

## 2021-12-30 PROCEDURE — 1111F DSCHRG MED/CURRENT MED MERGE: CPT | Performed by: NURSE PRACTITIONER

## 2021-12-30 PROCEDURE — 99214 OFFICE O/P EST MOD 30 MIN: CPT | Performed by: NURSE PRACTITIONER

## 2021-12-30 RX ORDER — ONDANSETRON 4 MG/1
4 TABLET, FILM COATED ORAL 3 TIMES DAILY PRN
Qty: 30 TABLET | Refills: 0 | Status: SHIPPED | OUTPATIENT
Start: 2021-12-30 | End: 2022-07-14

## 2021-12-30 NOTE — PROGRESS NOTES
Subjective:      Patient ID: Kristen Hou is a 39 y.o. female. HPI  Visit Information    Have you changed or started any medications since your last visit including any over-the-counter medicines, vitamins, or herbal medicines? no   Have you stopped taking any of your medications? Is so, why? -  no  Are you having any side effects from any of your medications? - no    Have you seen any other physician or provider since your last visit?  no   Have you had any other diagnostic tests since your last visit?  no   Have you been seen in the emergency room and/or had an admission in a hospital since we last saw you?  yes - [facility]   Have you had your routine dental cleaning in the past 6 months?  no     Do you have an active MyChart account? If no, what is the barrier? Yes    Patient Care Team:  Zack Bernard MD as PCP - General (Family Medicine)  Zack Bernard MD as PCP - [facility] Provider    Medical History Review  Past Medical, Family, and Social History reviewed and does contribute to the patient presenting condition    Health Maintenance   Topic Date Due    Hepatitis C screen  Never done    Varicella vaccine (1 of 2 - 2-dose childhood series) Never done    HIV screen  Never done    DTaP/Tdap/Td vaccine (1 - Tdap) Never done    Flu vaccine (1) Never done    COVID-19 Vaccine (3 - Booster for Moderna series) 10/24/2021    Lipid screen  08/30/2026    Hepatitis A vaccine  Aged Out    Hepatitis B vaccine  Aged Out    Hib vaccine  Aged Out    Meningococcal (ACWY) vaccine  Aged Out    Pneumococcal 0-64 years Vaccine  Aged Out          Post-Discharge Transitional Care Management Services or Hospital Follow Up      Kristen Hou   YOB: 1980    Date of Office Visit:  12/30/2021  Date of Hospital Admission: 12/27/21  Date of Hospital Discharge: 12/27/21  Risk of hospital readmission (high >=14%.  Medium >=10%) :No data recorded    Care management risk score Rising risk (score 2-5) and Complex Care (Scores >=6): 1     Non face to face  following discharge, date last encounter closed (first attempt may have been earlier): *No documented post hospital discharge outreach found in the last 14 days    Call initiated 2 business days of discharge: *No response recorded in the last 14 days    Patient Active Problem List   Diagnosis    Kidney stone    Vaginitis    Tinea corporis    Menorrhagia    Dysmenorrhea    Encounter for sterilization    Episode of unresponsiveness    POTS (postural orthostatic tachycardia syndrome)    Complex tear of medial meniscus of right knee    Mixed hyperlipidemia    B12 deficiency    Vitamin D deficiency       Allergies   Allergen Reactions    Dilaudid [Hydromorphone Hcl] Itching    Other Swelling     Spider Bites       Medications listed as ordered at the time of discharge from hospital     Medication List          Accurate as of December 30, 2021  1:45 PM. If you have any questions, ask your nurse or doctor.             START taking these medications    ondansetron 4 MG tablet  Commonly known as: ZOFRAN  Take 1 tablet by mouth 3 times daily as needed for Nausea or Vomiting  Started by: ANA Patel CNP        CONTINUE taking these medications    acetaminophen 500 MG tablet  Commonly known as: TYLENOL  Take 2 tablets by mouth every 6 hours as needed for Pain     BIOTIN 5000 PO     cetirizine 10 MG tablet  Commonly known as: ZYRTEC     cyanocobalamin 1000 MCG tablet  Commonly known as: CVS VITAMIN B12  Take 1 tablet by mouth daily     FLONASE ALLERGY RELIEF NA     meloxicam 7.5 MG tablet  Commonly known as: Mobic  Take 2 tablets by mouth daily With food     midodrine 2.5 MG tablet  Commonly known as: PROAMATINE     naproxen 500 MG tablet  Commonly known as: Naprosyn  Take 1 tablet by mouth 2 times daily     ondansetron 4 MG disintegrating tablet  Commonly known as: Zofran ODT  Take 1 tablet by mouth every 8 hours as needed for Nausea or Vomiting     rizatriptan 10 MG tablet  Commonly known as: Maxalt  Take 1 tablet by mouth once as needed for Migraine May repeat in 2 hours if needed     sulfamethoxazole-trimethoprim 800-160 MG per tablet  Commonly known as: Bactrim DS  Take 1 tablet by mouth 2 times daily for 10 days     topiramate 100 MG tablet  Commonly known as:  Topamax  Take 1 1/2 po bid     vitamin D 1.25 MG (27696 UT) Caps capsule  Commonly known as: ERGOCALCIFEROL  Take one cap po twice weekly for 3 months, then go back to once weekly           Where to Get Your Medications      These medications were sent to [address] [place] [address], [address] [facility], [facility]    Phone: [phone]   · ondansetron 4 MG tablet           Medications marked \"taking\" at this time  Outpatient Medications Marked as Taking for the 12/30/21 encounter (Office Visit) with ANA Connor - CNP   Medication Sig Dispense Refill    ondansetron (ZOFRAN) 4 MG tablet Take 1 tablet by mouth 3 times daily as needed for Nausea or Vomiting 30 tablet 0    sulfamethoxazole-trimethoprim (BACTRIM DS) 800-160 MG per tablet Take 1 tablet by mouth 2 times daily for 10 days 20 tablet 0    naproxen (NAPROSYN) 500 MG tablet Take 1 tablet by mouth 2 times daily 20 tablet 0    ondansetron (ZOFRAN ODT) 4 MG disintegrating tablet Take 1 tablet by mouth every 8 hours as needed for Nausea or Vomiting 20 tablet 0    vitamin D (ERGOCALCIFEROL) 1.25 MG (00325 UT) CAPS capsule Take one cap po twice weekly for 3 months, then go back to once weekly 24 capsule 0    BIOTIN 5000 PO Take by mouth      Fluticasone Propionate (FLONASE ALLERGY RELIEF NA) by Nasal route      topiramate (TOPAMAX) 100 MG tablet Take 1 1/2 po bid 90 tablet 5    cyanocobalamin (CVS VITAMIN B12) 1000 MCG tablet Take 1 tablet by mouth daily 90 tablet 0    rizatriptan (MAXALT) 10 MG tablet Take 1 tablet by mouth once as needed for Migraine May repeat in 2 hours if needed 9 tablet 2    midodrine (PROAMATINE) 2.5 MG tablet Take 5 mg by mouth 3 times daily      cetirizine (ZYRTEC) 10 MG tablet Take 10 mg by mouth daily           Medications patient taking as of now reconciled against medications ordered at time of hospital discharge: Yes    Chief Complaint   Patient presents with    Follow-Up from NAKUL V[facility]SQUEZ     [facility] on 12/27/21    Nephrolithiasis    Urinary Tract Infection       History of Present illness - Follow up of Hospital diagnosis(es):     39year old female presents with follow up s/p ER visit for nephrolithiasis UTI with hematuria and left flank pain. Was evaluated in ER 3 days ago for left flank pain and hematuria. Ct abd showed right kidney stones. UA was positive for UTI. Currently is on bactrim ds and states she still has bilateral flank and suprapubic pain with mild nausea. Denies fever chills cough sob or vomiting or constipation. Hx of kidney stones and used to follow up with Dr. Merle Denney   Hx of POTS managed by Dr. Magaly Navarro. Inpatient course: Discharge summary reviewed- see chart. Interval history/Current status: stable         A  comprehensive review of systems was negative except for what was noted in the HPI. Vitals:    12/30/21 1141   BP: 118/82   Site: Left Upper Arm   Position: Sitting   Cuff Size: Large Adult   Pulse: 67   Temp: 97.2 °F (36.2 °C)   TempSrc: Temporal   SpO2: 99%   Weight: 245 lb (111.1 kg)   Height: 5' 5\" (1.651 m)     Body mass index is 40.77 kg/m². Wt Readings from Last 3 Encounters:   12/30/21 245 lb (111.1 kg)   12/27/21 245 lb (111.1 kg)   11/17/21 243 lb 12.8 oz (110.6 kg)     BP Readings from Last 3 Encounters:   12/30/21 118/82   12/27/21 105/78   11/17/21 100/74        Physical Exam:    Constitutional: She is oriented to person, place, and time. She appears well-developed and well-nourished. No distress. HENT:   Mouth/Throat: No oropharyngeal exudate.    Eyes: Conjunctivae are

## 2022-01-06 ENCOUNTER — HOSPITAL ENCOUNTER (OUTPATIENT)
Age: 42
Setting detail: SPECIMEN
Discharge: HOME | End: 2022-01-06

## 2022-01-06 ENCOUNTER — OFFICE VISIT (OUTPATIENT)
Dept: UROLOGY | Age: 42
End: 2022-01-06
Payer: COMMERCIAL

## 2022-01-06 VITALS
HEIGHT: 65 IN | WEIGHT: 245 LBS | BODY MASS INDEX: 40.82 KG/M2 | TEMPERATURE: 97.5 F | SYSTOLIC BLOOD PRESSURE: 128 MMHG | DIASTOLIC BLOOD PRESSURE: 70 MMHG

## 2022-01-06 DIAGNOSIS — R31.9 URINARY TRACT INFECTION WITH HEMATURIA, SITE UNSPECIFIED: Primary | ICD-10-CM

## 2022-01-06 DIAGNOSIS — N39.0 URINARY TRACT INFECTION WITH HEMATURIA, SITE UNSPECIFIED: ICD-10-CM

## 2022-01-06 DIAGNOSIS — R31.9 URINARY TRACT INFECTION WITH HEMATURIA, SITE UNSPECIFIED: ICD-10-CM

## 2022-01-06 DIAGNOSIS — N39.0 URINARY TRACT INFECTION WITH HEMATURIA, SITE UNSPECIFIED: Primary | ICD-10-CM

## 2022-01-06 DIAGNOSIS — N20.0 KIDNEY STONE: ICD-10-CM

## 2022-01-06 LAB
BILIRUBIN, POC: ABNORMAL
BLOOD URINE, POC: ABNORMAL
CLARITY, POC: ABNORMAL
COLOR, POC: YELLOW
GLUCOSE URINE, POC: ABNORMAL
KETONES, POC: ABNORMAL
LEUKOCYTE EST, POC: ABNORMAL
NITRITE, POC: ABNORMAL
PH, POC: ABNORMAL
PROTEIN, POC: ABNORMAL
SPECIFIC GRAVITY, POC: ABNORMAL
UROBILINOGEN, POC: ABNORMAL

## 2022-01-06 PROCEDURE — G8484 FLU IMMUNIZE NO ADMIN: HCPCS | Performed by: UROLOGY

## 2022-01-06 PROCEDURE — G8427 DOCREV CUR MEDS BY ELIG CLIN: HCPCS | Performed by: UROLOGY

## 2022-01-06 PROCEDURE — G8417 CALC BMI ABV UP PARAM F/U: HCPCS | Performed by: UROLOGY

## 2022-01-06 PROCEDURE — 99204 OFFICE O/P NEW MOD 45 MIN: CPT | Performed by: UROLOGY

## 2022-01-06 PROCEDURE — 81002 URINALYSIS NONAUTO W/O SCOPE: CPT | Performed by: UROLOGY

## 2022-01-06 PROCEDURE — 1036F TOBACCO NON-USER: CPT | Performed by: UROLOGY

## 2022-01-06 RX ORDER — CEPHALEXIN 500 MG/1
500 CAPSULE ORAL 3 TIMES DAILY
Qty: 30 CAPSULE | Refills: 0 | Status: SHIPPED | OUTPATIENT
Start: 2022-01-06 | End: 2022-03-21 | Stop reason: ALTCHOICE

## 2022-01-06 ASSESSMENT — ENCOUNTER SYMPTOMS
EYE REDNESS: 0
SHORTNESS OF BREATH: 0
CONSTIPATION: 1
COUGH: 0
EYE PAIN: 0
VOMITING: 0
NAUSEA: 1
WHEEZING: 0
DIARRHEA: 0
ABDOMINAL PAIN: 0
BACK PAIN: 1

## 2022-01-06 NOTE — PROGRESS NOTES
Review of Systems   Constitutional: Negative for appetite change, chills and fever. Eyes: Negative for pain, redness and visual disturbance. Respiratory: Negative for cough, shortness of breath and wheezing. Cardiovascular: Negative for chest pain and leg swelling. Gastrointestinal: Positive for constipation and nausea. Negative for abdominal pain, diarrhea and vomiting. Genitourinary: Negative for difficulty urinating, dysuria, flank pain, frequency, hematuria and urgency. Musculoskeletal: Positive for back pain. Negative for joint swelling and myalgias. Skin: Negative for rash and wound. Neurological: Positive for dizziness. Negative for tremors and numbness. Hematological: Does not bruise/bleed easily.

## 2022-01-06 NOTE — PROGRESS NOTES
[address] [address]  Dept: 92 [facility] Vignesh [facility] Urology Office Note - New Patient    Patient:  Qing Huber  YOB: 1980  Date: 1/6/2022    The patient is a 39 y.o. female who presentstoday for evaluation of the following problems:   Chief Complaint   Patient presents with    New Patient     Kidney stone, kidney infection, UTI     referred by Daisy Craven MD.    HPI  Here for kidney stone, and uti, has some flank pain bilateral. Ct reviewed, urinating ok, on abx    (Patient's old records have been requested, reviewed and summarized in today's note.)    Summary of old records: N/A    History: N/A    ProceduresToday: N/A    Urinalysis today:  Results for POC orders placed in visit on 01/06/22   POCT Urinalysis no Micro   Result Value Ref Range    Color, UA YELLOW     Clarity, UA CLOUDY     Glucose, UA POC NEG     Bilirubin, UA      Ketones, UA      Spec Grav, UA      Blood, UA POC TRACE     pH, UA      Protein, UA POC NEG     Urobilinogen, UA      Leukocytes, UA +     Nitrite, UA NEG        AUA Symptom Score (1/6/2022):                                Last BUN andcreatinine:  Lab Results   Component Value Date    BUN 9 12/27/2021     Lab Results   Component Value Date    CREATININE 0.84 12/27/2021       Additional Lab/Culture results: none    Reviewed during this Office Visit: none  (results were independently reviewed byphysician and radiology report verified)    PAST MEDICAL, FAMILY AND SOCIAL HISTORY:  Past Medical History:   Diagnosis Date    Acute medial meniscus tear of right knee     Allergies     dust, cockroaches, cigarettes, spider bites   Dr. Janette Davila [facility]    Arthritis     right knee    Kidney stone 6/8/2016    Kidney stones     Liver mass     Migraine     POTS (postural orthostatic tachycardia syndrome)     Dr. Yudi Huff [facility] last visit 6/2021    Seizures ([address]     started 3/04/2021 Dr. Ritu Huston. last visit 9/02/2021    Wellness examination 07/14/2021    Kavitha Richard CNP with Dr. Gunjan Latham     Past Surgical History:   Procedure Laterality Date    APPENDECTOMY      CHOLECYSTECTOMY      CYSTOSCOPY  06/068/2016    lt ureteroscopy with holmium laser lithotripsy and lt ureteral stent    ENDOMETRIAL ABLATION      HYSTERECTOMY      partial    KNEE ARTHROSCOPY Right 10/19/2021    KNEE ARTHROSCOPY, PARTIAL MEDIAL MENISECTOMY, PARTIAL LATERAL RELEASE    KNEE ARTHROSCOPY Right 10/19/2021    KNEE ARTHROSCOPY, PARTIAL MEDIAL MENISECTOMY, PARTIAL LATERAL RELEASE  (3080 TABLE, SUPINE) performed by Larry Bah DO at [facility] LIVER [facility]       Family History   Problem Relation Age of Onset    Thyroid Disease Mother     Uterine Cancer Mother      Outpatient Medications Marked as Taking for the 1/6/22 encounter (Office Visit) with Sonia Fontana MD   Medication Sig Dispense Refill    cephALEXin (KEFLEX) 500 MG capsule Take 1 capsule by mouth 3 times daily 30 capsule 0    naproxen (NAPROSYN) 500 MG tablet Take 1 tablet by mouth 2 times daily 20 tablet 0    ondansetron (ZOFRAN ODT) 4 MG disintegrating tablet Take 1 tablet by mouth every 8 hours as needed for Nausea or Vomiting 20 tablet 0    vitamin D (ERGOCALCIFEROL) 1.25 MG (08938 UT) CAPS capsule Take one cap po twice weekly for 3 months, then go back to once weekly 24 capsule 0    BIOTIN 5000 PO Take by mouth      Fluticasone Propionate (FLONASE ALLERGY RELIEF NA) by Nasal route      topiramate (TOPAMAX) 100 MG tablet Take 1 1/2 po bid 90 tablet 5    rizatriptan (MAXALT) 10 MG tablet Take 1 tablet by mouth once as needed for Migraine May repeat in 2 hours if needed 9 tablet 2    midodrine (PROAMATINE) 2.5 MG tablet Take 5 mg by mouth 3 times daily      cetirizine (ZYRTEC) 10 MG tablet Take 10 mg by mouth daily          Other  Social History     Tobacco Use   Smoking Status Never Smoker   Smokeless Tobacco Never Used      (If patient a smoker, smoking cessation counseling offered)   Social History     Substance and Sexual Activity   Alcohol Use No       REVIEW OF SYSTEMS:  Review of Systems    Physical Exam:    This a 39 y.o. female      Vitals:    01/06/22 0930   BP: 128/70   Temp: 97.5 °F (36.4 °C)     Body mass index is 40.77 kg/m². Physical Exam  Constitutional: Patient in no acute distress, ggod grooming, appropriately dressed  Neuro: Alert and oriented to person, place and time. Psych:Mood normal, affect normal  Skin: No rash noted  HEENT: Head: Normocephalic and atraumatic,Conjunctivae and EOM are normal,Nose- normal, Right/Left External Ear: normal, Mouth: Mucosa Moist  Neck: Supple  Lungs: Respiratory effort is normal  Cardiovascular: strong and regular, no lower leg edema  Abdomen: Soft, non-tender, non-distended with no CVA,    Lymphatics: No cervical palpable lymphadenopathy. Bladder non-tender and not distended. Musculoskeletal: Normal gait and station        Assessment and Plan      1. Urinary tract infection with hematuria, site unspecified    2. Kidney stone            Plan:    keflex    Prescriptions Ordered:  Orders Placed This Encounter   Medications    cephALEXin (KEFLEX) 500 MG capsule     Sig: Take 1 capsule by mouth 3 times daily     Dispense:  30 capsule     Refill:  0      Orders Placed:  Orders Placed This Encounter   Procedures    XR ABDOMEN (KUB) (SINGLE AP VIEW)     Standing Status:   Future     Standing Expiration Date:   1/6/2023    POCT Urinalysis no Micro            Hayden Ashley MD    Agree with the ROS entered by the MA.

## 2022-01-07 LAB
CULTURE: NORMAL
Lab: NORMAL
SPECIMEN DESCRIPTION: NORMAL

## 2022-01-27 ENCOUNTER — OFFICE VISIT (OUTPATIENT)
Dept: ORTHOPEDIC SURGERY | Age: 42
End: 2022-01-27
Payer: COMMERCIAL

## 2022-01-27 VITALS — HEIGHT: 65 IN | BODY MASS INDEX: 40.65 KG/M2 | WEIGHT: 244 LBS

## 2022-01-27 DIAGNOSIS — R52 PAIN: Primary | ICD-10-CM

## 2022-01-27 PROCEDURE — G8484 FLU IMMUNIZE NO ADMIN: HCPCS | Performed by: STUDENT IN AN ORGANIZED HEALTH CARE EDUCATION/TRAINING PROGRAM

## 2022-01-27 PROCEDURE — 1036F TOBACCO NON-USER: CPT | Performed by: STUDENT IN AN ORGANIZED HEALTH CARE EDUCATION/TRAINING PROGRAM

## 2022-01-27 PROCEDURE — G8427 DOCREV CUR MEDS BY ELIG CLIN: HCPCS | Performed by: STUDENT IN AN ORGANIZED HEALTH CARE EDUCATION/TRAINING PROGRAM

## 2022-01-27 PROCEDURE — 99213 OFFICE O/P EST LOW 20 MIN: CPT | Performed by: STUDENT IN AN ORGANIZED HEALTH CARE EDUCATION/TRAINING PROGRAM

## 2022-01-27 PROCEDURE — G8417 CALC BMI ABV UP PARAM F/U: HCPCS | Performed by: STUDENT IN AN ORGANIZED HEALTH CARE EDUCATION/TRAINING PROGRAM
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
## 2022-02-15 DIAGNOSIS — S83.241A TEAR OF MEDIAL MENISCUS OF RIGHT KNEE, CURRENT, UNSPECIFIED TEAR TYPE, INITIAL ENCOUNTER: Primary | ICD-10-CM

## 2022-02-15 RX ORDER — MELOXICAM 15 MG/1
15 TABLET ORAL DAILY
Qty: 30 TABLET | Refills: 3 | Status: SHIPPED | OUTPATIENT
Start: 2022-02-15 | End: 2022-06-16 | Stop reason: SDUPTHER
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
## 2022-02-24 NOTE — VIRTUAL HEALTH
Consults  Patient Location:  [address] [facility]    Provider Location ([facility]/State): [place], [place]    This virtual visit was conducted via interactive/real-time audio/video. [facility]rology Consult for  [facility] Stroke Alert through [address] @ 7:11pm  2/24/2022 9:01 AM  Pt Name: Agueda Delarosa  MRN: [id number]  YOB: 1980  Date of evaluation: 2/24/2022  Primary Care Physician: Alix Brody MD  Reason for Evaluation: Stroke Evaluation with Discussion with Ed or primary team with Telemedicine and stroke evaluation with Review of imaging and labs    Agueda Delarosa is a 39 y.o. female with hx of migraine on topamax and rizatriptan. It was reported by EMS patient stiffen up around 6pm and then had a tonic clonic seizure, after the seizure she became confused. At the time I saw her in the [facility], patient is improved, but she can't recalled what happened, she keeps saying she wants to go home. Allergies  is allergic to other. Medications  Prior to Admission medications    Medication Sig Start Date End Date Taking?  Authorizing Provider   meloxicam (MOBIC) 15 MG tablet Take 1 tablet by mouth daily 2/15/22   Carmela Moody DO   cyanocobalamin ([facility] VITAMIN B12) 1000 MCG tablet Take 1 tablet by mouth daily 1/25/22   ANA Centeno CNP   cephALEXin (KEFLEX) 500 MG capsule Take 1 capsule by mouth 3 times daily 1/6/22   Tiny Chen MD   ondansetron (ZOFRAN) 4 MG tablet Take 1 tablet by mouth 3 times daily as needed for Nausea or Vomiting 12/30/21   ANA Centeno CNP   acetaminophen (TYLENOL) 500 MG tablet Take 2 tablets by mouth every 6 hours as needed for Pain  Patient not taking: Reported on 12/30/2021 12/27/21   Lorena Kilgore MD   naproxen (NAPROSYN) 500 MG tablet Take 1 tablet by mouth 2 times daily 12/27/21   Lorena Kilgore MD   ondansetron (ZOFRAN ODT) 4 MG disintegrating tablet Take 1 tablet by mouth every 8 hours as needed for Nausea or Vomiting 12/27/21   Lorena Kilgore MD   meloxicam (MOBIC) 7.5 MG tablet Take 2 tablets by mouth daily With food 12/20/21   Lamar Morejon PA-C   vitamin D (ERGOCALCIFEROL) 1.25 MG (13196 UT) CAPS capsule Take one cap po twice weekly for 3 months, then go back to once weekly 12/6/21   ANA Centeno - CNP   BIOTIN 5000 PO Take by mouth    Historical Provider, MD   Fluticasone Propionate (FLONASE ALLERGY RELIEF NA) by Nasal route    Historical Provider, MD   topiramate (TOPAMAX) 100 MG tablet Take 1 1/2 po bid 9/2/21   Nunu Gardner MD   rizatriptan (MAXALT) 10 MG tablet Take 1 tablet by mouth once as needed for Migraine May repeat in 2 hours if needed 6/21/21 1/6/22  Alix Brody MD   midodrine (PROAMATINE) 2.5 MG tablet Take 5 mg by mouth 3 times daily 5/5/21   Historical Provider, MD   cetirizine (ZYRTEC) 10 MG tablet Take 10 mg by mouth daily  3/4/21   Historical Provider, MD    Scheduled Meds:  Continuous Infusions:  PRN Meds:.  Past Medical History   has a past medical history of Acute medial meniscus tear of right knee, Allergies, Arthritis, Kidney stone, Kidney stones, Liver mass, Migraine, POTS (postural orthostatic tachycardia syndrome), Seizures ([address] and Wellness examination.   Social History  Social History     Socioeconomic History    Marital status:      Spouse name: Not on file    Number of children: Not on file    Years of education: Not on file    Highest education level: Not on file   Occupational History    Not on file   Tobacco Use    Smoking status: Never Smoker    Smokeless tobacco: Never Used   Vaping Use    Vaping Use: Never used   Substance and Sexual Activity    Alcohol use: No    Drug use: No    Sexual activity: Not on file   Other Topics Concern    Not on file   Social History Narrative    Not on file     Social Determinants of Health     Financial Resource Strain: Low Risk     Difficulty of Paying Living Expenses: Not hard at all   Food Insecurity: No Food Insecurity    Worried About Running Out of Food in the Last Year: Never true    Ran Out of Food in the Last Year: Never true   Transportation Needs: No Transportation Needs    Lack of Transportation (Medical): No    Lack of Transportation (Non-Medical): No   Physical Activity:     Days of Exercise per Week: Not on file    Minutes of Exercise per Session: Not on file   Stress:     Feeling of Stress : Not on file   Social Connections:     Frequency of Communication with Friends and Family: Not on file    Frequency of Social Gatherings with Friends and Family: Not on file    Attends Christianity Services: Not on file    Active Member of [address] [place] [address] [employer] or Organizations: Not on file    Attends Club or Organization Meetings: Not on file    Marital Status: Not on file   Intimate Partner Violence:     Fear of Current or Ex-Partner: Not on file    Emotionally Abused: Not on file    Physically Abused: Not on file    Sexually Abused: Not on file   Housing Stability:     Unable to Pay for Housing in the Last Year: Not on file    Number of [place] in the Last Year: Not on file    Unstable Housing in the Last Year: Not on file     Family History      Problem Relation Age of Onset    Thyroid Disease Mother     Uterine Cancer Mother        OBJECTIVE  There were no vitals taken for this visit. AAO X 2, follow all commands, not oriented to time  No drift  Face symmetrical  Sensation normal    Pre-Morbid mRS: 0    Imaging:  Images were personally reviewed with HARPREET FARMER used to review images including:  CT brain without contrast: normal  CTA imaging: n/a    Assessment    36year old woman with migraine, now with reported seizure like activity. Recommendations:  1. NIH 0  2. Recommend Inpatient Neurology Consult for further assessment and evaluation   3.  consider . [id number]  4.  Not a tPA candidate as this is a seizure and not a stroke  5. It is unclear if this is a real seizure, and given this is a first seizure, I would not put her on a new seizure meds, patient is already on topamax for migraine  6. Will defer to inpatient neurologist who will manage her to decide the workup needed. Discussed with Stroke team    At least 33 min of Telemedicine and time in conversation directly with ED staff and physician for the patient who is in imminent and life threatening deterioration without further treatment and evaluation. This Virtual Visit was conducted with patient's (and/or legal guardian's) consent, to provide telestroke consultation and necessary medical care.   Time spent examining patient, reviewing the images personally, reviewing the chart, perform high complexity decision making and speaking with the nursing staff regarding recommendations        Rich Brunner RN, MD   Stroke, Neurocritical Care And/or [address] [place] [address] Stroke [address]  Electronically signed 2/24/2022 at 9:01 AM

## 2022-03-21 ENCOUNTER — OFFICE VISIT (OUTPATIENT)
Dept: FAMILY MEDICINE CLINIC | Age: 42
End: 2022-03-21
Payer: COMMERCIAL

## 2022-03-21 VITALS
BODY MASS INDEX: 40.44 KG/M2 | DIASTOLIC BLOOD PRESSURE: 80 MMHG | RESPIRATION RATE: 16 BRPM | WEIGHT: 243 LBS | TEMPERATURE: 97.3 F | HEART RATE: 74 BPM | SYSTOLIC BLOOD PRESSURE: 122 MMHG

## 2022-03-21 DIAGNOSIS — E55.9 VITAMIN D DEFICIENCY: ICD-10-CM

## 2022-03-21 DIAGNOSIS — E78.2 MIXED HYPERLIPIDEMIA: Primary | ICD-10-CM

## 2022-03-21 DIAGNOSIS — E66.01 MORBID OBESITY WITH BMI OF 40.0-44.9, ADULT (HCC): ICD-10-CM

## 2022-03-21 DIAGNOSIS — E53.8 B12 DEFICIENCY: ICD-10-CM

## 2022-03-21 PROBLEM — G40.909 SEIZURE DISORDER (HCC): Status: ACTIVE | Noted: 2022-03-21

## 2022-03-21 PROBLEM — G43.009 MIGRAINE WITHOUT AURA AND WITHOUT STATUS MIGRAINOSUS, NOT INTRACTABLE: Status: ACTIVE | Noted: 2022-03-21

## 2022-03-21 PROCEDURE — 99214 OFFICE O/P EST MOD 30 MIN: CPT | Performed by: FAMILY MEDICINE

## 2022-03-21 PROCEDURE — 1036F TOBACCO NON-USER: CPT | Performed by: FAMILY MEDICINE

## 2022-03-21 PROCEDURE — G8427 DOCREV CUR MEDS BY ELIG CLIN: HCPCS | Performed by: FAMILY MEDICINE

## 2022-03-21 PROCEDURE — G8484 FLU IMMUNIZE NO ADMIN: HCPCS | Performed by: FAMILY MEDICINE

## 2022-03-21 PROCEDURE — G8417 CALC BMI ABV UP PARAM F/U: HCPCS | Performed by: FAMILY MEDICINE

## 2022-03-21 SDOH — ECONOMIC STABILITY: FOOD INSECURITY: WITHIN THE PAST 12 MONTHS, THE FOOD YOU BOUGHT JUST DIDN'T LAST AND YOU DIDN'T HAVE MONEY TO GET MORE.: NEVER TRUE

## 2022-03-21 SDOH — ECONOMIC STABILITY: INCOME INSECURITY: HOW HARD IS IT FOR YOU TO PAY FOR THE VERY BASICS LIKE FOOD, HOUSING, MEDICAL CARE, AND HEATING?: NOT HARD AT ALL

## 2022-03-21 SDOH — ECONOMIC STABILITY: FOOD INSECURITY: WITHIN THE PAST 12 MONTHS, YOU WORRIED THAT YOUR FOOD WOULD RUN OUT BEFORE YOU GOT MONEY TO BUY MORE.: NEVER TRUE

## 2022-03-21 ASSESSMENT — ENCOUNTER SYMPTOMS
ABDOMINAL PAIN: 0
BLOOD IN STOOL: 0
CHEST TIGHTNESS: 0
SHORTNESS OF BREATH: 0

## 2022-03-21 ASSESSMENT — PATIENT HEALTH QUESTIONNAIRE - PHQ9
2. FEELING DOWN, DEPRESSED OR HOPELESS: 0
SUM OF ALL RESPONSES TO PHQ QUESTIONS 1-9: 0
SUM OF ALL RESPONSES TO PHQ QUESTIONS 1-9: 0
1. LITTLE INTEREST OR PLEASURE IN DOING THINGS: 0
SUM OF ALL RESPONSES TO PHQ QUESTIONS 1-9: 0
SUM OF ALL RESPONSES TO PHQ9 QUESTIONS 1 & 2: 0
SUM OF ALL RESPONSES TO PHQ QUESTIONS 1-9: 0

## 2022-03-21 NOTE — PROGRESS NOTES
Subjective:      Patient ID: Wilson Rodriguez is a 39 y.o. female. Visit Information    Have you changed or started any medications since your last visit including any over-the-counter medicines, vitamins, or herbal medicines? no   Have you stopped taking any of your medications? Is so, why? -  no  Are you having any side effects from any of your medications? - no    Have you seen any other physician or provider since your last visit?  no   Have you had any other diagnostic tests since your last visit?  no   Have you been seen in the emergency room and/or had an admission in a hospital since we last saw you?  no   Have you had your routine dental cleaning in the past 6 months?  no     Do you have an active MyChart account? If no, what is the barrier? Yes    Patient Care Team:  Christi Massey MD as PCP - General (Family Medicine)  Christi Massey MD as PCP - [facility] Provider    Medical History Review  Past Medical, Family, and Social History reviewed and does contribute to the patient presenting condition    Health Maintenance   Topic Date Due    Hepatitis C screen  Never done    Varicella vaccine (1 of 2 - 2-dose childhood series) Never done    Depression Screen  Never done    HIV screen  Never done    DTaP/Tdap/Td vaccine (1 - Tdap) Never done    Flu vaccine (1) Never done    COVID-19 Vaccine (3 - Booster for Moderna series) 09/24/2021    Lipid screen  08/30/2026    Hepatitis A vaccine  Aged Out    Hepatitis B vaccine  Aged Out    Hib vaccine  Aged Out    Meningococcal (ACWY) vaccine  Aged Out    Pneumococcal 0-64 years Vaccine  Aged Out               HPI  Patient is a 75-year-old morbidly obese white female who presents for hyperlipidemia, vitamin D deficiency, vitamin B12 deficiency. Patient states that she is taking and tolerating her routine medications. She denies any fever, chills, chest pain, abdominal pain, shortness of breath.   She has a good appetite and remains active. Review of Systems   Constitutional: Negative for chills and fever. HENT: Negative for congestion. Respiratory: Negative for chest tightness and shortness of breath. Cardiovascular: Negative for chest pain. Gastrointestinal: Negative for abdominal pain and blood in stool. Genitourinary: Negative for dysuria and hematuria. Skin: Negative for rash. Neurological: Negative for dizziness. Psychiatric/Behavioral: Negative for dysphoric mood. Objective:   Physical Exam  Vitals and nursing note reviewed. Constitutional:       General: She is not in acute distress. Appearance: She is well-developed. HENT:      Head: Normocephalic and atraumatic. Right Ear: Tympanic membrane, ear canal and external ear normal.      Left Ear: Tympanic membrane, ear canal and external ear normal.      Nose: Nose normal.      Mouth/Throat:      Mouth: Mucous membranes are moist.      Pharynx: Oropharynx is clear. Eyes:      General: No scleral icterus. Right eye: No discharge. Left eye: No discharge. Conjunctiva/sclera: Conjunctivae normal.   Cardiovascular:      Rate and Rhythm: Normal rate and regular rhythm. Heart sounds: Normal heart sounds. Pulmonary:      Effort: Pulmonary effort is normal. No respiratory distress. Breath sounds: Normal breath sounds. No wheezing. Abdominal:      General: There is no distension. Palpations: Abdomen is soft. Tenderness: There is no abdominal tenderness. Musculoskeletal:      Cervical back: Neck supple. Skin:     General: Skin is warm and dry. Findings: No rash. Neurological:      Mental Status: She is alert and oriented to person, place, and time. Psychiatric:         Mood and Affect: Mood normal.         Behavior: Behavior normal.         Assessment:       Diagnosis Orders   1. Mixed hyperlipidemia  ALT    AST    Basic Metabolic Panel    Lipid Panel   2. Vitamin D deficiency  Vitamin D 25 Hydroxy   3. B12 deficiency  Vitamin B12   4. Morbid obesity with BMI of 40.0-44.9, adult ([facility] [address]             Plan:      Orders Placed This Encounter   Procedures    ALT     Standing Status:   Future     Standing Expiration Date:   3/21/2023    AST     Standing Status:   Future     Standing Expiration Date:   3/21/2023    Basic Metabolic Panel     Fasting     Standing Status:   Future     Standing Expiration Date:   3/21/2023    Lipid Panel     Standing Status:   Future     Standing Expiration Date:   3/21/2023     Order Specific Question:   Is Patient Fasting?/# of Hours     Answer:    Fast 8-10 hours    Vitamin B12     Standing Status:   Future     Standing Expiration Date:   3/21/2023    Vitamin D 25 Hydroxy     Standing Status:   Future     Standing Expiration Date:   3/21/2023         Continue routine medications  Follow-up in 4 months

## 2022-03-25 ENCOUNTER — HOSPITAL ENCOUNTER (OUTPATIENT)
Age: 42
Discharge: HOME | End: 2022-03-25
Payer: COMMERCIAL

## 2022-03-25 DIAGNOSIS — E55.9 VITAMIN D DEFICIENCY: ICD-10-CM

## 2022-03-25 DIAGNOSIS — E53.8 B12 DEFICIENCY: ICD-10-CM

## 2022-03-25 DIAGNOSIS — E78.2 MIXED HYPERLIPIDEMIA: ICD-10-CM

## 2022-03-25 LAB
ALT SERPL-CCNC: 16 U/L (ref 5–33)
ANION GAP SERPL CALCULATED.3IONS-SCNC: 11 MMOL/L (ref 9–17)
AST SERPL-CCNC: 15 U/L
BUN BLDV-MCNC: 14 MG/DL (ref 6–20)
CALCIUM SERPL-MCNC: 9.1 MG/DL (ref 8.6–10.4)
CHLORIDE BLD-SCNC: 108 MMOL/L (ref 98–107)
CHOLESTEROL/HDL RATIO: 3.9
CHOLESTEROL: 171 MG/DL
CO2: 21 MMOL/L (ref 20–31)
CREAT SERPL-MCNC: 0.86 MG/DL (ref 0.5–0.9)
GFR AFRICAN AMERICAN: >60 ML/MIN
GFR NON-AFRICAN AMERICAN: >60 ML/MIN
GFR SERPL CREATININE-BSD FRML MDRD: ABNORMAL ML/MIN/{1.73_M2}
GLUCOSE BLD-MCNC: 82 MG/DL (ref 70–99)
HDLC SERPL-MCNC: 44 MG/DL
LDL CHOLESTEROL: 115 MG/DL (ref 0–130)
POTASSIUM SERPL-SCNC: 4.1 MMOL/L (ref 3.7–5.3)
SODIUM BLD-SCNC: 140 MMOL/L (ref 135–144)
TRIGL SERPL-MCNC: 58 MG/DL
VITAMIN B-12: 660 PG/ML (ref 232–1245)
VITAMIN D 25-HYDROXY: 39.1 NG/ML

## 2022-03-25 PROCEDURE — 80061 LIPID PANEL: CPT

## 2022-03-25 PROCEDURE — 82607 VITAMIN B-12: CPT

## 2022-03-25 PROCEDURE — 82306 VITAMIN D 25 HYDROXY: CPT

## 2022-03-25 PROCEDURE — 84450 TRANSFERASE (AST) (SGOT): CPT

## 2022-03-25 PROCEDURE — 36415 COLL VENOUS BLD VENIPUNCTURE: CPT

## 2022-03-25 PROCEDURE — 84460 ALANINE AMINO (ALT) (SGPT): CPT

## 2022-03-25 PROCEDURE — 80048 BASIC METABOLIC PNL TOTAL CA: CPT

## 2022-06-14 RX ORDER — TOPIRAMATE 100 MG/1
TABLET, FILM COATED ORAL
Qty: 90 TABLET | Refills: 0 | Status: SHIPPED | OUTPATIENT
Start: 2022-06-14

## 2022-06-14 NOTE — TELEPHONE ENCOUNTER
**Please send for Dr. Pita Villagran**      Pharmacy requesting refill of topirimate 100 mg. Medication active on med list yes      Date of last Rx: 9/2/2021 with 5 refills          verified by SB, RMA      Date of last appointment 10/14/2021    Next Visit Date:  None, message left to schedule follow up appointment.

## 2022-06-16 DIAGNOSIS — S83.241A TEAR OF MEDIAL MENISCUS OF RIGHT KNEE, CURRENT, UNSPECIFIED TEAR TYPE, INITIAL ENCOUNTER: ICD-10-CM

## 2022-06-16 RX ORDER — MELOXICAM 15 MG/1
15 TABLET ORAL DAILY
Qty: 30 TABLET | Refills: 0 | Status: SHIPPED | OUTPATIENT
Start: 2022-06-16 | End: 2022-07-20 | Stop reason: SDUPTHER

## 2022-06-16 NOTE — TELEPHONE ENCOUNTER
I will give her a final 30-day supply of meloxicam she will need to get refills from her PCP if she needs long-term use.   That way they can watch her kidney function and for ulcers

## 2022-07-13 ENCOUNTER — HOSPITAL ENCOUNTER (OUTPATIENT)
Dept: GENERAL RADIOLOGY | Age: 42
Discharge: HOME | End: 2022-07-15
Payer: COMMERCIAL

## 2022-07-13 ENCOUNTER — HOSPITAL ENCOUNTER (OUTPATIENT)
Age: 42
Discharge: HOME | End: 2022-07-15
Payer: COMMERCIAL

## 2022-07-13 DIAGNOSIS — N20.0 KIDNEY STONE: ICD-10-CM

## 2022-07-13 PROCEDURE — 74018 RADEX ABDOMEN 1 VIEW: CPT

## 2022-07-14 ENCOUNTER — OFFICE VISIT (OUTPATIENT)
Dept: UROLOGY | Age: 42
End: 2022-07-14
Payer: COMMERCIAL

## 2022-07-14 VITALS
BODY MASS INDEX: 40.48 KG/M2 | SYSTOLIC BLOOD PRESSURE: 100 MMHG | OXYGEN SATURATION: 90 % | HEART RATE: 63 BPM | HEIGHT: 65 IN | WEIGHT: 243 LBS | TEMPERATURE: 97 F | DIASTOLIC BLOOD PRESSURE: 80 MMHG

## 2022-07-14 DIAGNOSIS — N20.0 KIDNEY STONES: ICD-10-CM

## 2022-07-14 DIAGNOSIS — R10.9 FLANK PAIN: Primary | ICD-10-CM

## 2022-07-14 PROCEDURE — G8417 CALC BMI ABV UP PARAM F/U: HCPCS | Performed by: UROLOGY

## 2022-07-14 PROCEDURE — G8427 DOCREV CUR MEDS BY ELIG CLIN: HCPCS | Performed by: UROLOGY

## 2022-07-14 PROCEDURE — 99214 OFFICE O/P EST MOD 30 MIN: CPT | Performed by: UROLOGY

## 2022-07-14 PROCEDURE — 1036F TOBACCO NON-USER: CPT | Performed by: UROLOGY

## 2022-07-14 RX ORDER — MIDODRINE HYDROCHLORIDE 5 MG/1
5 TABLET ORAL 3 TIMES DAILY
COMMUNITY
Start: 2022-07-06

## 2022-07-14 RX ORDER — AZELASTINE HYDROCHLORIDE 137 UG/1
1-2 SPRAY, METERED NASAL 2 TIMES DAILY
COMMUNITY
Start: 2022-06-07

## 2022-07-14 RX ORDER — METOPROLOL SUCCINATE 25 MG/1
12.5 TABLET, EXTENDED RELEASE ORAL DAILY
COMMUNITY
Start: 2022-07-06 | End: 2022-07-25

## 2022-07-14 ASSESSMENT — ENCOUNTER SYMPTOMS
NAUSEA: 0
DIARRHEA: 0
VOMITING: 0
SORE THROAT: 0
WHEEZING: 0
SHORTNESS OF BREATH: 0
COUGH: 0

## 2022-07-14 NOTE — PROGRESS NOTES
[address] [address]  Dept: 92 [address] [facility] [facility] Urology Office Note - Established    Patient:  Catherine Carcamo  YOB: 1980  Date: 7/14/2022    The patient is a 39 y.o. female whopresents today for evaluation of the following problems:   Chief Complaint   Patient presents with    6 Month Follow-Up       HPI  Here for right flank pain, this is new. Worsening. No dysuria,no hematuria, no fevers, kub reviewed and neg    Summary of old records: N/A    Additional History: N/A    Procedures Today: N/A    Urinalysis today:  No results found for this visit on 07/14/22. Imaging Reviewed during this Office Visit: none  (results were independently reviewed by physician and radiology report verified)    AUA Symptom Score (7/14/2022):                                Last BUN and creatinine:  Lab Results   Component Value Date    BUN 14 03/25/2022     Lab Results   Component Value Date    CREATININE 0.86 03/25/2022       Additional Lab/Culture results: none    PAST MEDICAL, FAMILY AND SOCIAL HISTORY UPDATE:  Past Medical History:   Diagnosis Date    Acute medial meniscus tear of right knee     Allergies     dust, cockroaches, cigarettes, spider bites   Dr. Mary Ann Finnegan [facility]    Arthritis     right knee    Kidney stone 6/8/2016    Kidney stones     Liver mass     Migraine     POTS (postural orthostatic tachycardia syndrome)     Dr. Da Carmichael. V last visit 6/2021    Seizures ([address]     started 3/04/2021 Dr. Daniella Carmen. last visit 9/02/2021    Wellness examination 07/14/2021    Darwin Vargas CNP with Dr. Robert Ashby     Past Surgical History:   Procedure Laterality Date    APPENDECTOMY      CHOLECYSTECTOMY      CYSTOSCOPY  06/068/2016    lt ureteroscopy with holmium laser lithotripsy and lt ureteral stent    ENDOMETRIAL ABLATION      HYSTERECTOMY      partial    KNEE ARTHROSCOPY Right 10/19/2021    KNEE ARTHROSCOPY, PARTIAL MEDIAL MENISECTOMY, PARTIAL LATERAL RELEASE    KNEE ARTHROSCOPY Right 10/19/2021    KNEE ARTHROSCOPY, PARTIAL MEDIAL MENISECTOMY, PARTIAL LATERAL RELEASE  (3080 TABLE, SUPINE) performed by Jaya Sarmiento DO at [address] [place] [address] Drive LIVER BIOPSY       Family History   Problem Relation Age of Onset    Thyroid Disease Mother     Uterine Cancer Mother      Outpatient Medications Marked as Taking for the 7/14/22 encounter (Office Visit) with Moncho Rogel MD   Medication Sig Dispense Refill    Azelastine HCl 137 MCG/SPRAY SOLN USE 1 TO 2 DOSES IN EACH NOSTRIL TWICE DAILY NEED TO USE REGULARLY FOR BENEFIT      metoprolol succinate (TOPROL XL) 25 MG extended release tablet       midodrine (PROAMATINE) 5 MG tablet       meloxicam (MOBIC) 15 MG tablet Take 1 tablet by mouth daily 30 tablet 0    topiramate (TOPAMAX) 100 MG tablet Take 1 1/2 po bid 90 tablet 0    cyanocobalamin ([facility] VITAMIN B12) 1000 MCG tablet Take 1 tablet by mouth daily 90 tablet 0    ondansetron (ZOFRAN ODT) 4 MG disintegrating tablet Take 1 tablet by mouth every 8 hours as needed for Nausea or Vomiting 20 tablet 0    vitamin D (ERGOCALCIFEROL) 1.25 MG (94609 UT) CAPS capsule Take one cap po twice weekly for 3 months, then go back to once weekly 24 capsule 0    BIOTIN 5000 PO Take by mouth      Fluticasone Propionate (FLONASE ALLERGY RELIEF NA) by Nasal route      cetirizine (ZYRTEC) 10 MG tablet Take 10 mg by mouth daily         (All medications reviewed and updated by provider sincelast office visit or hospitalization)   Other  Social History     Tobacco Use   Smoking Status Never Smoker   Smokeless Tobacco Never Used      (If patient a smoker, smoking cessation counseling offered)     Social History     Substance and Sexual Activity   Alcohol Use No       REVIEW OF SYSTEMS:  Review of Systems      Physical Exam:      Vitals: 07/14/22 1025   BP: 100/80   Pulse: 63   Temp: 97 °F (36.1 °C)   SpO2: 90%     Body mass index is 40.44 kg/m². Patient is a 39 y.o. female in noacute distress and alert and oriented to person, place and time. Physical Exam  Constitutional: Patient in no acute distress. Neuro: Alert andoriented to person, place and time. Psych: Mood normal, affect normal  Skin: No rash noted  HEENT: Head: Normocephalic and atraumatic  Conjunctivae and EOM are normal. Pupils are equal, round  Nose: Normal  Right External Ear: Normal; Left External Ear: Normal  Mouth: Mucosa Moist  Neck: Supple  Lungs: Respiratory effort is normal  Cardiovascular: Warm & Pink  Abdomen: Soft, non-tender, non-distended with no CVA,  No flank tenderness,  Or hepatosplenomegaly   Lymphatics: No palpable lymphadenopathy. and Plan      1. Flank pain    2. Kidney stones           Plan:      Return in about 3 weeks (around 8/4/2022) for Follow up. Prescriptions Ordered:  No orders of the defined types were placed in this encounter. Orders Placed:  Orders Placed This Encounter   Procedures    CT ABDOMEN PELVIS WO CONTRAST Additional Contrast? None     Standing Status:   Future     Standing Expiration Date:   7/14/2023     Order Specific Question:   Additional Contrast?     Answer:   None            Pramod Miller MD    Agree with the ROS entered by the MA.

## 2022-07-14 NOTE — PROGRESS NOTES
Review of Systems   Constitutional: Negative for chills, fatigue and fever. HENT: Negative for congestion and sore throat. Respiratory: Negative for cough, shortness of breath and wheezing. Cardiovascular: Negative for chest pain and palpitations. Gastrointestinal: Negative for diarrhea, nausea and vomiting. Genitourinary: Positive for flank pain. Negative for difficulty urinating, dysuria, frequency, hematuria, pelvic pain and urgency.

## 2022-07-20 ENCOUNTER — OFFICE VISIT (OUTPATIENT)
Dept: FAMILY MEDICINE CLINIC | Age: 42
End: 2022-07-20
Payer: COMMERCIAL

## 2022-07-20 VITALS
HEART RATE: 68 BPM | DIASTOLIC BLOOD PRESSURE: 74 MMHG | TEMPERATURE: 97.6 F | BODY MASS INDEX: 42.2 KG/M2 | WEIGHT: 253.6 LBS | SYSTOLIC BLOOD PRESSURE: 104 MMHG | RESPIRATION RATE: 12 BRPM

## 2022-07-20 DIAGNOSIS — G43.009 MIGRAINE WITHOUT AURA AND WITHOUT STATUS MIGRAINOSUS, NOT INTRACTABLE: Primary | ICD-10-CM

## 2022-07-20 PROCEDURE — G8427 DOCREV CUR MEDS BY ELIG CLIN: HCPCS | Performed by: NURSE PRACTITIONER

## 2022-07-20 PROCEDURE — G8417 CALC BMI ABV UP PARAM F/U: HCPCS | Performed by: NURSE PRACTITIONER

## 2022-07-20 PROCEDURE — 1036F TOBACCO NON-USER: CPT | Performed by: NURSE PRACTITIONER

## 2022-07-20 PROCEDURE — 99214 OFFICE O/P EST MOD 30 MIN: CPT | Performed by: NURSE PRACTITIONER

## 2022-07-20 RX ORDER — SUMATRIPTAN AND NAPROXEN SODIUM 85; 500 MG/1; MG/1
TABLET, FILM COATED ORAL
Qty: 9 TABLET | Refills: 1 | Status: SHIPPED | OUTPATIENT
Start: 2022-07-20 | End: 2022-07-25 | Stop reason: ALTCHOICE

## 2022-07-20 RX ORDER — MELOXICAM 15 MG/1
15 TABLET ORAL DAILY
Qty: 30 TABLET | Refills: 0 | Status: SHIPPED | OUTPATIENT
Start: 2022-07-20 | End: 2022-07-26

## 2022-07-20 ASSESSMENT — ENCOUNTER SYMPTOMS
ABDOMINAL PAIN: 0
PHOTOPHOBIA: 1
WHEEZING: 0
VOMITING: 0
NAUSEA: 1
SHORTNESS OF BREATH: 0

## 2022-07-20 NOTE — PROGRESS NOTES
Subjective:      Patient ID: Anette Jordan is a 39 y.o. female. Visit Information    Have you changed or started any medications since your last visit including any over-the-counter medicines, vitamins, or herbal medicines? no   Are you having any side effects from any of your medications? -  yes - hair loss with Topirmate  Have you stopped taking any of your medications? Is so, why? -  no    Have you seen any other physician or provider since your last visit? No  Have you had any other diagnostic tests since your last visit? Yes - Records Obtained  Have you been seen in the emergency room and/or had an admission to a hospital since we last saw you? No  Have you had your routine dental cleaning in the past 6 months? no    Have you activated your [employer] account? If not, what are your barriers? Yes     Patient Care Team:  Jose Enrique Sood MD as PCP - General (Family Medicine)  Jose Enrique Sood MD as PCP - [facility]    Medical History Review  Past Medical, Family, and Social History reviewed and does contribute to the patient presenting condition    Health Maintenance   Topic Date Due    Varicella vaccine (1 of 2 - 2-dose childhood series) Never done    HIV screen  Never done    Hepatitis C screen  Never done    DTaP/Tdap/Td vaccine (1 - Tdap) Never done    COVID-19 Vaccine (3 - Booster for Moderna series) 09/24/2021    Flu vaccine (1) 09/01/2022    Depression Screen  03/21/2023    Lipids  03/25/2027    Hepatitis A vaccine  Aged Out    Hepatitis B vaccine  Aged Out    Hib vaccine  Aged Out    Meningococcal (ACWY) vaccine  Aged Out    Pneumococcal 0-64 years Vaccine  Aged Out       HPI    39year old female presents with management of worsening migraine headache with medication adjustment. Has had pain in left temporal area or 2 days throbbing pounding constant. Also has photophobia and phonophobia associated with headache. Pt had similar headache in the past which didn't last this long. Currently is on topomax and maxalt managed by Dr. Leonides Gama and states they don't work. Had MRI/A brain which was unremarkable. Hx of seizures     Review of Systems   Constitutional:  Negative for chills and fever. Eyes:  Positive for photophobia (phonophobia). Negative for visual disturbance. Respiratory:  Negative for shortness of breath and wheezing. Cardiovascular:  Negative for chest pain and leg swelling. Gastrointestinal:  Positive for nausea. Negative for abdominal pain and vomiting. Neurological:  Positive for headaches. Negative for dizziness, weakness and numbness. Psychiatric/Behavioral:  Negative for agitation and behavioral problems. Objective:   Physical Exam  Vitals and nursing note reviewed. Constitutional:       General: She is not in acute distress. Appearance: Normal appearance. She is obese. HENT:      Nose: Nose normal.   Eyes:      Conjunctiva/sclera: Conjunctivae normal.   Cardiovascular:      Rate and Rhythm: Normal rate and regular rhythm. Heart sounds: Normal heart sounds. Pulmonary:      Effort: Pulmonary effort is normal. No respiratory distress. Breath sounds: Normal breath sounds. Abdominal:      Palpations: Abdomen is soft. Tenderness: There is no abdominal tenderness. Musculoskeletal:         General: Normal range of motion. Cervical back: Neck supple. Lymphadenopathy:      Cervical: No cervical adenopathy. Skin:     General: Skin is warm and dry. Neurological:      Mental Status: She is alert and oriented to person, place, and time. Cranial Nerves: No cranial nerve deficit. Psychiatric:         Mood and Affect: Mood normal.         Behavior: Behavior normal.       Assessment:     1.  Migraine without aura and without status migrainosus, not intractable            Plan:     BP Readings from Last 3 Encounters:   07/20/22 104/74   07/14/22 100/80   03/21/22 122/80     /74 (Site: Left Upper Arm, Position: Sitting, Cuff Size: Large Adult)   Pulse 68   Temp 97.6 °F (36.4 °C) (Infrared)   Resp 12   Wt 253 lb 9.6 oz (115 kg)   BMI 42.20 kg/m²   Lab Results   Component Value Date    WBC 8.5 12/27/2021    HGB 14.4 12/27/2021    HCT 43.5 12/27/2021     12/27/2021    CHOL 171 03/25/2022    TRIG 58 03/25/2022    HDL 44 03/25/2022    ALT 16 03/25/2022    AST 15 03/25/2022     03/25/2022    K 4.1 03/25/2022     (H) 03/25/2022    CREATININE 0.86 03/25/2022    BUN 14 03/25/2022    CO2 21 03/25/2022    TSH 1.39 06/14/2021    INR 1.0 07/24/2015    LABA1C 5.3 10/28/2019     Lab Results   Component Value Date    CALCIUM 9.1 03/25/2022     Lab Results   Component Value Date    LDLCHOLESTEROL 115 03/25/2022         1. Migraine without aura and without status migrainosus, not intractable  - cont topamax and start Treximet   - SUMAtriptan-Naproxen Sodium (TREXIMET)  MG TABS tablet; Take one tab po at onset of migraine, may repeat once in 2 hours. Maximum 2 tabs in 24 hours. Dispense: 9 tablet; Refill: 1  - advised to keep headache log and avoid triggers. - refer pt to a new neurologist per pt's request       Requested Prescriptions     Signed Prescriptions Disp Refills    meloxicam (MOBIC) 15 MG tablet 30 tablet 0     Sig: Take 1 tablet by mouth in the morning. (Avoid taking mobid with treximet together). SUMAtriptan-Naproxen Sodium (TREXIMET)  MG TABS tablet 9 tablet 1     Sig: Take one tab po at onset of migraine, may repeat once in 2 hours. Maximum 2 tabs in 24 hours. Medications Discontinued During This Encounter   Medication Reason    ondansetron (ZOFRAN ODT) 4 MG disintegrating tablet Therapy completed    meloxicam (MOBIC) 15 MG tablet REORDER     Discussed use, benefit, and side effects of prescribed medications. Barriers to medication compliance addressed. All patient questions answered. Pt voiced understanding. No follow-ups on file.             Jeff Ross, APRN - CNP

## 2022-07-21 ENCOUNTER — HOSPITAL ENCOUNTER (INPATIENT)
Age: 42
LOS: 1 days | Discharge: HOME | DRG: 054 | End: 2022-07-22
Attending: STUDENT IN AN ORGANIZED HEALTH CARE EDUCATION/TRAINING PROGRAM | Admitting: FAMILY MEDICINE
Payer: COMMERCIAL

## 2022-07-21 ENCOUNTER — APPOINTMENT (OUTPATIENT)
Dept: GENERAL RADIOLOGY | Age: 42
DRG: 054 | End: 2022-07-21
Payer: COMMERCIAL

## 2022-07-21 DIAGNOSIS — R00.1 SYMPTOMATIC BRADYCARDIA: Primary | ICD-10-CM

## 2022-07-21 DIAGNOSIS — R56.9 SEIZURES (HCC): Primary | ICD-10-CM

## 2022-07-21 DIAGNOSIS — G43.101 CLASSICAL MIGRAINE WITH STATUS MIGRAINOSUS: ICD-10-CM

## 2022-07-21 PROBLEM — G43.111 MIGRAINE WITH AURA, INTRACTABLE, WITH STATUS MIGRAINOSUS: Status: ACTIVE | Noted: 2022-07-21

## 2022-07-21 LAB
ABSOLUTE EOS #: 0.3 K/UL (ref 0–0.4)
ABSOLUTE LYMPH #: 2.4 K/UL (ref 1–4.8)
ABSOLUTE MONO #: 0.6 K/UL (ref 0.1–1.3)
ANION GAP SERPL CALCULATED.3IONS-SCNC: 9 MMOL/L (ref 9–17)
BASOPHILS # BLD: 1 % (ref 0–2)
BASOPHILS ABSOLUTE: 0 K/UL (ref 0–0.2)
BUN BLDV-MCNC: 15 MG/DL (ref 6–20)
CALCIUM SERPL-MCNC: 9.1 MG/DL (ref 8.6–10.4)
CHLORIDE BLD-SCNC: 109 MMOL/L (ref 98–107)
CO2: 23 MMOL/L (ref 20–31)
CREAT SERPL-MCNC: 0.93 MG/DL (ref 0.5–0.9)
EKG ATRIAL RATE: 46 BPM
EKG P AXIS: 43 DEGREES
EKG P-R INTERVAL: 178 MS
EKG Q-T INTERVAL: 518 MS
EKG QRS DURATION: 80 MS
EKG QTC CALCULATION (BAZETT): 453 MS
EKG R AXIS: 56 DEGREES
EKG T AXIS: 63 DEGREES
EKG VENTRICULAR RATE: 46 BPM
EOSINOPHILS RELATIVE PERCENT: 4 % (ref 0–4)
GFR AFRICAN AMERICAN: >60 ML/MIN
GFR NON-AFRICAN AMERICAN: >60 ML/MIN
GFR SERPL CREATININE-BSD FRML MDRD: ABNORMAL ML/MIN/{1.73_M2}
GLUCOSE BLD-MCNC: 92 MG/DL (ref 70–99)
HCT VFR BLD CALC: 39.8 % (ref 36–46)
HEMOGLOBIN: 13.3 G/DL (ref 12–16)
LYMPHOCYTES # BLD: 32 % (ref 24–44)
MAGNESIUM: 1.9 MG/DL (ref 1.6–2.6)
MCH RBC QN AUTO: 28.5 PG (ref 26–34)
MCHC RBC AUTO-ENTMCNC: 33.3 G/DL (ref 31–37)
MCV RBC AUTO: 85.7 FL (ref 80–100)
MONOCYTES # BLD: 9 % (ref 1–7)
PDW BLD-RTO: 13.6 % (ref 11.5–14.9)
PLATELET # BLD: 247 K/UL (ref 150–450)
PMV BLD AUTO: 7.4 FL (ref 6–12)
POTASSIUM SERPL-SCNC: 3.6 MMOL/L (ref 3.7–5.3)
RBC # BLD: 4.65 M/UL (ref 4–5.2)
SEG NEUTROPHILS: 54 % (ref 36–66)
SEGMENTED NEUTROPHILS ABSOLUTE COUNT: 4.1 K/UL (ref 1.3–9.1)
SODIUM BLD-SCNC: 141 MMOL/L (ref 135–144)
THYROXINE, FREE: 0.89 NG/DL (ref 0.93–1.7)
TROPONIN, HIGH SENSITIVITY: <6 NG/L (ref 0–14)
TSH SERPL DL<=0.05 MIU/L-ACNC: 3.57 UIU/ML (ref 0.3–5)
WBC # BLD: 7.5 K/UL (ref 3.5–11)

## 2022-07-21 PROCEDURE — 99253 IP/OBS CNSLTJ NEW/EST LOW 45: CPT | Performed by: PSYCHIATRY & NEUROLOGY

## 2022-07-21 PROCEDURE — 84439 ASSAY OF FREE THYROXINE: CPT

## 2022-07-21 PROCEDURE — 6370000000 HC RX 637 (ALT 250 FOR IP): Performed by: NURSE PRACTITIONER

## 2022-07-21 PROCEDURE — 1200000000 HC SEMI PRIVATE

## 2022-07-21 PROCEDURE — 80048 BASIC METABOLIC PNL TOTAL CA: CPT

## 2022-07-21 PROCEDURE — 84480 ASSAY TRIIODOTHYRONINE (T3): CPT

## 2022-07-21 PROCEDURE — 85025 COMPLETE CBC W/AUTO DIFF WBC: CPT

## 2022-07-21 PROCEDURE — 6370000000 HC RX 637 (ALT 250 FOR IP): Performed by: FAMILY MEDICINE

## 2022-07-21 PROCEDURE — 84484 ASSAY OF TROPONIN QUANT: CPT

## 2022-07-21 PROCEDURE — 6360000002 HC RX W HCPCS: Performed by: STUDENT IN AN ORGANIZED HEALTH CARE EDUCATION/TRAINING PROGRAM

## 2022-07-21 PROCEDURE — 2580000003 HC RX 258: Performed by: FAMILY MEDICINE

## 2022-07-21 PROCEDURE — 84443 ASSAY THYROID STIM HORMONE: CPT

## 2022-07-21 PROCEDURE — 2580000003 HC RX 258: Performed by: STUDENT IN AN ORGANIZED HEALTH CARE EDUCATION/TRAINING PROGRAM

## 2022-07-21 PROCEDURE — 2580000003 HC RX 258: Performed by: PSYCHIATRY & NEUROLOGY

## 2022-07-21 PROCEDURE — 93005 ELECTROCARDIOGRAM TRACING: CPT | Performed by: STUDENT IN AN ORGANIZED HEALTH CARE EDUCATION/TRAINING PROGRAM

## 2022-07-21 PROCEDURE — 96361 HYDRATE IV INFUSION ADD-ON: CPT

## 2022-07-21 PROCEDURE — 36415 COLL VENOUS BLD VENIPUNCTURE: CPT

## 2022-07-21 PROCEDURE — 96374 THER/PROPH/DIAG INJ IV PUSH: CPT

## 2022-07-21 PROCEDURE — 99285 EMERGENCY DEPT VISIT HI MDM: CPT

## 2022-07-21 PROCEDURE — 84436 ASSAY OF TOTAL THYROXINE: CPT

## 2022-07-21 PROCEDURE — 2500000003 HC RX 250 WO HCPCS: Performed by: PSYCHIATRY & NEUROLOGY

## 2022-07-21 PROCEDURE — 6360000002 HC RX W HCPCS: Performed by: FAMILY MEDICINE

## 2022-07-21 PROCEDURE — 2500000003 HC RX 250 WO HCPCS: Performed by: INTERNAL MEDICINE

## 2022-07-21 PROCEDURE — 96375 TX/PRO/DX INJ NEW DRUG ADDON: CPT

## 2022-07-21 PROCEDURE — 83735 ASSAY OF MAGNESIUM: CPT

## 2022-07-21 PROCEDURE — 71045 X-RAY EXAM CHEST 1 VIEW: CPT

## 2022-07-21 RX ORDER — SODIUM CHLORIDE 0.9 % (FLUSH) 0.9 %
5-40 SYRINGE (ML) INJECTION PRN
Status: DISCONTINUED | OUTPATIENT
Start: 2022-07-21 | End: 2022-07-22 | Stop reason: HOSPADM

## 2022-07-21 RX ORDER — ACETAMINOPHEN 325 MG/1
650 TABLET ORAL EVERY 4 HOURS PRN
Status: DISCONTINUED | OUTPATIENT
Start: 2022-07-21 | End: 2022-07-22 | Stop reason: HOSPADM

## 2022-07-21 RX ORDER — LANOLIN ALCOHOL/MO/W.PET/CERES
1000 CREAM (GRAM) TOPICAL NIGHTLY
Status: DISCONTINUED | OUTPATIENT
Start: 2022-07-22 | End: 2022-07-22 | Stop reason: HOSPADM

## 2022-07-21 RX ORDER — LORAZEPAM 2 MG/ML
0.5 INJECTION INTRAMUSCULAR ONCE
Status: COMPLETED | OUTPATIENT
Start: 2022-07-21 | End: 2022-07-21

## 2022-07-21 RX ORDER — SODIUM CHLORIDE 9 MG/ML
INJECTION, SOLUTION INTRAVENOUS CONTINUOUS
Status: DISCONTINUED | OUTPATIENT
Start: 2022-07-21 | End: 2022-07-22 | Stop reason: HOSPADM

## 2022-07-21 RX ORDER — MIDODRINE HYDROCHLORIDE 5 MG/1
5 TABLET ORAL
Status: DISCONTINUED | OUTPATIENT
Start: 2022-07-22 | End: 2022-07-22 | Stop reason: HOSPADM

## 2022-07-21 RX ORDER — ATROPINE SULFATE 0.1 MG/ML
INJECTION INTRAVENOUS
Status: DISPENSED
Start: 2022-07-21 | End: 2022-07-21

## 2022-07-21 RX ORDER — ATROPINE SULFATE 0.1 MG/ML
0.5 INJECTION INTRAVENOUS ONCE
Status: COMPLETED | OUTPATIENT
Start: 2022-07-21 | End: 2022-07-21

## 2022-07-21 RX ORDER — MIDODRINE HYDROCHLORIDE 5 MG/1
5 TABLET ORAL
Status: DISCONTINUED | OUTPATIENT
Start: 2022-07-21 | End: 2022-07-21

## 2022-07-21 RX ORDER — TOPIRAMATE 100 MG/1
100 TABLET, FILM COATED ORAL 2 TIMES DAILY
Status: DISCONTINUED | OUTPATIENT
Start: 2022-07-21 | End: 2022-07-22 | Stop reason: HOSPADM

## 2022-07-21 RX ORDER — MELOXICAM 15 MG/1
15 TABLET ORAL NIGHTLY
Status: DISCONTINUED | OUTPATIENT
Start: 2022-07-22 | End: 2022-07-22 | Stop reason: HOSPADM

## 2022-07-21 RX ORDER — FLUTICASONE PROPIONATE 50 MCG
1 SPRAY, SUSPENSION (ML) NASAL DAILY
Status: DISCONTINUED | OUTPATIENT
Start: 2022-07-21 | End: 2022-07-22 | Stop reason: HOSPADM

## 2022-07-21 RX ORDER — LANOLIN ALCOHOL/MO/W.PET/CERES
1000 CREAM (GRAM) TOPICAL DAILY
Status: DISCONTINUED | OUTPATIENT
Start: 2022-07-21 | End: 2022-07-21

## 2022-07-21 RX ORDER — ENOXAPARIN SODIUM 100 MG/ML
30 INJECTION SUBCUTANEOUS 2 TIMES DAILY
Status: DISCONTINUED | OUTPATIENT
Start: 2022-07-21 | End: 2022-07-22 | Stop reason: HOSPADM

## 2022-07-21 RX ORDER — AZELASTINE 1 MG/ML
1 SPRAY, METERED NASAL 2 TIMES DAILY
Status: DISCONTINUED | OUTPATIENT
Start: 2022-07-21 | End: 2022-07-22

## 2022-07-21 RX ORDER — ONDANSETRON 4 MG/1
4 TABLET, ORALLY DISINTEGRATING ORAL EVERY 8 HOURS PRN
Status: DISCONTINUED | OUTPATIENT
Start: 2022-07-21 | End: 2022-07-22 | Stop reason: HOSPADM

## 2022-07-21 RX ORDER — CETIRIZINE HYDROCHLORIDE 10 MG/1
10 TABLET ORAL 2 TIMES DAILY
Status: DISCONTINUED | OUTPATIENT
Start: 2022-07-21 | End: 2022-07-22 | Stop reason: HOSPADM

## 2022-07-21 RX ORDER — SODIUM CHLORIDE 0.9 % (FLUSH) 0.9 %
5-40 SYRINGE (ML) INJECTION EVERY 12 HOURS SCHEDULED
Status: DISCONTINUED | OUTPATIENT
Start: 2022-07-21 | End: 2022-07-22 | Stop reason: HOSPADM

## 2022-07-21 RX ORDER — METOCLOPRAMIDE HYDROCHLORIDE 5 MG/ML
10 INJECTION INTRAMUSCULAR; INTRAVENOUS EVERY 8 HOURS PRN
Status: DISCONTINUED | OUTPATIENT
Start: 2022-07-21 | End: 2022-07-21

## 2022-07-21 RX ORDER — 0.9 % SODIUM CHLORIDE 0.9 %
1000 INTRAVENOUS SOLUTION INTRAVENOUS ONCE
Status: COMPLETED | OUTPATIENT
Start: 2022-07-21 | End: 2022-07-21

## 2022-07-21 RX ORDER — SODIUM CHLORIDE 9 MG/ML
INJECTION, SOLUTION INTRAVENOUS PRN
Status: DISCONTINUED | OUTPATIENT
Start: 2022-07-21 | End: 2022-07-22 | Stop reason: HOSPADM

## 2022-07-21 RX ORDER — MAGNESIUM SULFATE 1 G/100ML
1000 INJECTION INTRAVENOUS ONCE
Status: COMPLETED | OUTPATIENT
Start: 2022-07-21 | End: 2022-07-21

## 2022-07-21 RX ORDER — ONDANSETRON 2 MG/ML
4 INJECTION INTRAMUSCULAR; INTRAVENOUS ONCE
Status: COMPLETED | OUTPATIENT
Start: 2022-07-21 | End: 2022-07-21

## 2022-07-21 RX ORDER — DIPHENHYDRAMINE HYDROCHLORIDE 50 MG/ML
25 INJECTION INTRAMUSCULAR; INTRAVENOUS ONCE
Status: COMPLETED | OUTPATIENT
Start: 2022-07-21 | End: 2022-07-21

## 2022-07-21 RX ORDER — BUTALBITAL, ACETAMINOPHEN AND CAFFEINE 300; 40; 50 MG/1; MG/1; MG/1
1 CAPSULE ORAL EVERY 6 HOURS PRN
Status: DISCONTINUED | OUTPATIENT
Start: 2022-07-21 | End: 2022-07-22 | Stop reason: HOSPADM

## 2022-07-21 RX ORDER — TOPIRAMATE 100 MG/1
100 TABLET, FILM COATED ORAL DAILY
Status: DISCONTINUED | OUTPATIENT
Start: 2022-07-21 | End: 2022-07-21

## 2022-07-21 RX ORDER — 0.9 % SODIUM CHLORIDE 0.9 %
1000 INTRAVENOUS SOLUTION INTRAVENOUS ONCE
Status: DISCONTINUED | OUTPATIENT
Start: 2022-07-21 | End: 2022-07-21

## 2022-07-21 RX ORDER — ONDANSETRON 2 MG/ML
4 INJECTION INTRAMUSCULAR; INTRAVENOUS EVERY 6 HOURS PRN
Status: DISCONTINUED | OUTPATIENT
Start: 2022-07-21 | End: 2022-07-22 | Stop reason: HOSPADM

## 2022-07-21 RX ORDER — KETOROLAC TROMETHAMINE 30 MG/ML
30 INJECTION, SOLUTION INTRAMUSCULAR; INTRAVENOUS ONCE
Status: COMPLETED | OUTPATIENT
Start: 2022-07-21 | End: 2022-07-21

## 2022-07-21 RX ORDER — DEXAMETHASONE SODIUM PHOSPHATE 10 MG/ML
10 INJECTION, SOLUTION INTRAMUSCULAR; INTRAVENOUS ONCE
Status: COMPLETED | OUTPATIENT
Start: 2022-07-21 | End: 2022-07-21

## 2022-07-21 RX ORDER — DIPHENHYDRAMINE HCL 25 MG
25 TABLET ORAL EVERY 8 HOURS PRN
Status: DISCONTINUED | OUTPATIENT
Start: 2022-07-21 | End: 2022-07-21

## 2022-07-21 RX ORDER — MELOXICAM 15 MG/1
15 TABLET ORAL DAILY
Status: DISCONTINUED | OUTPATIENT
Start: 2022-07-21 | End: 2022-07-21

## 2022-07-21 RX ORDER — CETIRIZINE HYDROCHLORIDE 10 MG/1
10 TABLET ORAL DAILY
Status: DISCONTINUED | OUTPATIENT
Start: 2022-07-21 | End: 2022-07-21

## 2022-07-21 RX ADMIN — MIDODRINE HYDROCHLORIDE 5 MG: 5 TABLET ORAL at 09:28

## 2022-07-21 RX ADMIN — ATROPINE SULFATE 0.5 MG: 0.1 INJECTION PARENTERAL at 04:45

## 2022-07-21 RX ADMIN — CETIRIZINE HYDROCHLORIDE 10 MG: 10 TABLET, FILM COATED ORAL at 14:00

## 2022-07-21 RX ADMIN — KETOROLAC TROMETHAMINE 30 MG: 30 INJECTION, SOLUTION INTRAMUSCULAR; INTRAVENOUS at 04:41

## 2022-07-21 RX ADMIN — VALPROATE SODIUM 1000 MG: 100 INJECTION, SOLUTION INTRAVENOUS at 16:54

## 2022-07-21 RX ADMIN — TOPIRAMATE 100 MG: 100 TABLET, FILM COATED ORAL at 09:28

## 2022-07-21 RX ADMIN — TOPIRAMATE 100 MG: 100 TABLET, FILM COATED ORAL at 23:16

## 2022-07-21 RX ADMIN — SODIUM CHLORIDE: 9 INJECTION, SOLUTION INTRAVENOUS at 23:16

## 2022-07-21 RX ADMIN — DIPHENHYDRAMINE HYDROCHLORIDE 25 MG: 50 INJECTION, SOLUTION INTRAMUSCULAR; INTRAVENOUS at 04:40

## 2022-07-21 RX ADMIN — ENOXAPARIN SODIUM 30 MG: 100 INJECTION SUBCUTANEOUS at 22:18

## 2022-07-21 RX ADMIN — GLUCAGON HYDROCHLORIDE 5 MG: 1 INJECTION, POWDER, FOR SOLUTION INTRAMUSCULAR; INTRAVENOUS; SUBCUTANEOUS at 23:55

## 2022-07-21 RX ADMIN — CETIRIZINE HYDROCHLORIDE 10 MG: 10 TABLET, FILM COATED ORAL at 23:16

## 2022-07-21 RX ADMIN — ENOXAPARIN SODIUM 30 MG: 100 INJECTION SUBCUTANEOUS at 07:54

## 2022-07-21 RX ADMIN — MIDODRINE HYDROCHLORIDE 5 MG: 5 TABLET ORAL at 13:00

## 2022-07-21 RX ADMIN — ONDANSETRON 4 MG: 2 INJECTION INTRAMUSCULAR; INTRAVENOUS at 04:49

## 2022-07-21 RX ADMIN — CYANOCOBALAMIN TAB 1000 MCG 1000 MCG: 1000 TAB at 09:28

## 2022-07-21 RX ADMIN — DEXAMETHASONE SODIUM PHOSPHATE 10 MG: 10 INJECTION, SOLUTION INTRAMUSCULAR; INTRAVENOUS at 06:11

## 2022-07-21 RX ADMIN — SODIUM CHLORIDE: 9 INJECTION, SOLUTION INTRAVENOUS at 09:28

## 2022-07-21 RX ADMIN — LORAZEPAM 0.5 MG: 2 INJECTION INTRAMUSCULAR; INTRAVENOUS at 05:52

## 2022-07-21 RX ADMIN — SODIUM CHLORIDE 1000 ML: 9 INJECTION, SOLUTION INTRAVENOUS at 04:38

## 2022-07-21 RX ADMIN — SODIUM CHLORIDE 1000 ML: 9 INJECTION, SOLUTION INTRAVENOUS at 05:45

## 2022-07-21 RX ADMIN — MAGNESIUM SULFATE HEPTAHYDRATE 1000 MG: 1 INJECTION, SOLUTION INTRAVENOUS at 06:14

## 2022-07-21 RX ADMIN — FLUTICASONE PROPIONATE 1 SPRAY: 50 SPRAY, METERED NASAL at 15:35

## 2022-07-21 ASSESSMENT — PAIN DESCRIPTION - FREQUENCY
FREQUENCY: CONTINUOUS

## 2022-07-21 ASSESSMENT — ENCOUNTER SYMPTOMS
ABDOMINAL PAIN: 0
SHORTNESS OF BREATH: 0
EYE REDNESS: 0
NAUSEA: 0
EYE DISCHARGE: 0
RHINORRHEA: 0
DIARRHEA: 0
SORE THROAT: 0
VOMITING: 0

## 2022-07-21 ASSESSMENT — PAIN DESCRIPTION - DESCRIPTORS
DESCRIPTORS: ACHING

## 2022-07-21 ASSESSMENT — PAIN SCALES - GENERAL
PAINLEVEL_OUTOF10: 7
PAINLEVEL_OUTOF10: 6
PAINLEVEL_OUTOF10: 10
PAINLEVEL_OUTOF10: 10
PAINLEVEL_OUTOF10: 8

## 2022-07-21 ASSESSMENT — PAIN DESCRIPTION - LOCATION
LOCATION: HEAD

## 2022-07-21 ASSESSMENT — PAIN DESCRIPTION - ORIENTATION
ORIENTATION: LEFT
ORIENTATION: LEFT

## 2022-07-21 ASSESSMENT — PAIN - FUNCTIONAL ASSESSMENT
PAIN_FUNCTIONAL_ASSESSMENT: 0-10
PAIN_FUNCTIONAL_ASSESSMENT: ACTIVITIES ARE NOT PREVENTED

## 2022-07-21 ASSESSMENT — PAIN DESCRIPTION - ONSET: ONSET: GRADUAL

## 2022-07-21 ASSESSMENT — PAIN DESCRIPTION - PAIN TYPE: TYPE: ACUTE PAIN

## 2022-07-21 NOTE — ED NOTES
Admission Dx: Migraine    Pts Chief Complaints on Arrival: Headache    ADL's - Self-care    Pending Diagnostics:  n/a    Residence PTA: single story home    Special Considerations/Circumstances:  n/a    Vitals: Current vital signs:  /76   Pulse 55   Temp 97.9 °F (36.6 °C) (Oral)   Resp 16   Ht 5' 5\" (1.651 m)   Wt 253 lb (114.8 kg)   SpO2 95%   BMI 42.10 kg/m²                MEWS Score: 2471 Lissette Moreno RN  07/21/22 8550

## 2022-07-21 NOTE — ED NOTES
Mode of arrival (squad #, walk in, police, etc) : walk-in    Chief complaint(s): migraine    Arrival Note (brief scenario, treatment PTA, etc). : Pt arrives to ED c/o migraine headache x 3 days. Pt has hx of seizures and is concerned that it may trigger one. Has not had a seizure. C= \"Have you ever felt that you should Cut down on your drinking? \"  No  A= \"Have people Annoyed you by criticizing your drinking? \"  No  G= \"Have you ever felt bad or Guilty about your drinking? \"  No  E= \"Have you ever had a drink as an Eye-opener first thing in the morning to steady your nerves or to help a hangover? \"  No      Deferred []      Reason for deferring: N/A    *If yes to two or more: probable alcohol abuse. Marcie Peterson RN  07/21/22 4221

## 2022-07-21 NOTE — ED PROVIDER NOTES
EMERGENCY DEPARTMENT ENCOUNTER    Pt Name: Arianne Escoto  MRN: [id number]  [place] 1980  Date of evaluation: 7/21/22  CHIEF COMPLAINT       Chief Complaint   Patient presents with    Migraine     x 3 days     HISTORY OF PRESENT ILLNESS   70-year-old presents with migraine    States she gets migraine headaches this feels exactly the same    A few days. Aching. Throbbing. Diffuse. Severe. Gradual onset. No trauma. No fevers chills neck pain or stiffness          REVIEW OF SYSTEMS     Review of Systems   Constitutional:  Negative for chills and fever. HENT:  Negative for rhinorrhea and sore throat. Eyes:  Negative for discharge and redness. Respiratory:  Negative for shortness of breath. Cardiovascular:  Negative for chest pain. Gastrointestinal:  Negative for abdominal pain, diarrhea, nausea and vomiting. Genitourinary:  Negative for dysuria, frequency and urgency. Musculoskeletal:  Negative for arthralgias and myalgias. Skin:  Negative for rash. Neurological:  Positive for headaches. Negative for weakness and numbness. Psychiatric/Behavioral:  Negative for suicidal ideas. All other systems reviewed and are negative.   PASTMEDICAL HISTORY     Past Medical History:   Diagnosis Date    Acute medial meniscus tear of right knee     Allergies     dust, cockroaches, cigarettes, spider bites   Dr. Osman Hightower [facility] Allergist    Arthritis     right knee    Kidney stone 6/8/2016    Kidney stones     Liver mass     Migraine     POTS (postural orthostatic tachycardia syndrome)     Dr. Begum Mom. V last visit 6/2021    Seizures ([address]     started 3/04/2021 Dr. Mike Stanford. last visit 9/02/2021    Wellness examination 07/14/2021    Smith Duran CNP with Dr. Paul Calvo     Past Problem List  Patient Active Problem List   Diagnosis Code    Kidney stone N20.0    Vaginitis N76.0    Tinea corporis B35.4    Menorrhagia N92.0    Dysmenorrhea N94.6    Encounter for sterilization Z30.2    Episode of unresponsiveness R41.89    POTS (postural orthostatic tachycardia syndrome) I49.8    Complex tear of medial meniscus of right knee S83.231A    Mixed hyperlipidemia E78.2    B12 deficiency E53.8    Vitamin D deficiency E55.9    Seizure disorder ([facility]) G40.909    Migraine without aura and without status migrainosus, not intractable G43.009    Morbid obesity with BMI of 40.0-44.9, adult ([facility]) E66.01, Z68.41    Migraine with aura, intractable, with status migrainosus G43. 111     SURGICAL HISTORY       Past Surgical History:   Procedure Laterality Date    APPENDECTOMY      CHOLECYSTECTOMY      CYSTOSCOPY  06/068/2016    lt ureteroscopy with holmium laser lithotripsy and lt ureteral stent    ENDOMETRIAL ABLATION      HYSTERECTOMY (CERVIX STATUS UNKNOWN)      partial    KNEE ARTHROSCOPY Right 10/19/2021    KNEE ARTHROSCOPY, PARTIAL MEDIAL MENISECTOMY, PARTIAL LATERAL RELEASE    KNEE ARTHROSCOPY Right 10/19/2021    KNEE ARTHROSCOPY, PARTIAL MEDIAL MENISECTOMY, PARTIAL LATERAL RELEASE  (3080 TABLE, SUPINE) performed by Boyd Pallas, DO at [address] [facility]       Previous Medications    AZELASTINE  MCG/SPRAY SOLN    USE 1 TO 2 DOSES IN EACH NOSTRIL TWICE DAILY NEED TO USE REGULARLY FOR BENEFIT    BIOTIN 5000 PO    Take by mouth    CETIRIZINE (ZYRTEC) 10 MG TABLET    Take 10 mg by mouth daily     CYANOCOBALAMIN ([employer] VITAMIN B12) 1000 MCG TABLET    Take 1 tablet by mouth daily    FLUTICASONE PROPIONATE (FLONASE ALLERGY RELIEF NA)    by Nasal route    MELOXICAM (MOBIC) 15 MG TABLET    Take 1 tablet by mouth in the morning. (Avoid taking mobid with treximet together).     METOPROLOL SUCCINATE (TOPROL XL) 25 MG EXTENDED RELEASE TABLET        MIDODRINE (PROAMATINE) 5 MG TABLET        RIZATRIPTAN (MAXALT) 10 MG TABLET    Take 1 tablet by mouth once as needed for Migraine May repeat in 2 hours if needed    SUMATRIPTAN-NAPROXEN SODIUM (TREXIMET)  MG TABS TABLET    Take one tab po at onset of migraine, may repeat once in 2 hours. Maximum 2 tabs in 24 hours. TOPIRAMATE (TOPAMAX) 100 MG TABLET    Take 1 1/2 po bid    VITAMIN D (ERGOCALCIFEROL) 1.25 MG (97501 UT) CAPS CAPSULE    Take one cap po twice weekly for 3 months, then go back to once weekly     ALLERGIES     is allergic to other. FAMILY HISTORY     She indicated that her mother is alive. SOCIAL HISTORY       Social History     Tobacco Use    Smoking status: Never    Smokeless tobacco: Never   Vaping Use    Vaping Use: Never used   Substance Use Topics    Alcohol use: No    Drug use: No     PHYSICAL EXAM     INITIAL VITALS: /77   Pulse 55   Temp 97.9 °F (36.6 °C) (Oral)   Resp 16   Ht 5' 5\" (1.651 m)   Wt 253 lb (114.8 kg)   SpO2 96%   BMI 42.10 kg/m²    Physical Exam  Vitals and nursing note reviewed. Constitutional:       Appearance: Normal appearance. HENT:      Head: Normocephalic and atraumatic. Nose: Nose normal.      Mouth/Throat:      Mouth: Mucous membranes are moist.   Eyes:      Conjunctiva/sclera: Conjunctivae normal.      Pupils: Pupils are equal, round, and reactive to light. Cardiovascular:      Rate and Rhythm: Normal rate and regular rhythm. Pulses: Normal pulses. Heart sounds: Normal heart sounds. Pulmonary:      Effort: Pulmonary effort is normal.      Breath sounds: Normal breath sounds. Abdominal:      Palpations: Abdomen is soft. Tenderness: There is no abdominal tenderness. Musculoskeletal:         General: No swelling or deformity. Cervical back: Normal range of motion. Skin:     General: Skin is warm. Findings: No rash. Neurological:      General: No focal deficit present. Mental Status: She is alert and oriented to person, place, and time.       Comments: Cranial nerves II through XII intact, 5 out of 5 strength in upper and lower extremities, sensation intact throughout, normal finger-nose   Psychiatric:         Mood and Affect: Mood normal.       MEDICAL DECISION MAKIN-year-old presents with migraine headache    No red flag signs or symptoms normal neuro exam doubt intracranial bleed or mass    Labs and symptomatic therapy  ED Course as of 22 0606   Thu 2022   0435 CBC with Auto Differential(!):    WBC 7.5   RBC 4.65   Hemoglobin Quant 13.3   Hematocrit 39.8   MCV 85.7   MCH 28.5   MCHC 33.3   RDW 13.6   Platelet Count 240   MPV 7.4   Seg Neutrophils 54   Lymphocytes 32   Monocytes 9(!)   Eosinophils % 4   Basophils 1   Segs Absolute 4.10   Absolute Lymph # 2.40   Absolute Mono # 0.60   Absolute Eos # 0.30   Basophils Absolute 0.00  All within normal limits [ANGEL]   0457 BMP(!):    GLUCOSE, FASTING,GF 92   BUN,BUNPL 15   Creatinine 0.93(!)   CALCIUM, SERUM, 600083 9.1   Sodium 141   Potassium 3.6(!)   Chloride 109(!)   CO2 23   Anion Gap 9   GFR Non- >60   GFR  >60   GFR Comment       Unremarkable [ANGEL]   0527 Magnesium:    Magnesium 1.9  Normal [ANGEL]   0528 Troponin:    Troponin, High Sensitivity <6  Normal [ANGEL]   6275 Accepted by Dr. Joseph Awad for admission [ANGEL]      ED Course User Index  [ANGEL] John Valentine MD       CRITICAL CARE:       PROCEDURES:    EKG 12 Lead    Date/Time: 2022 4:55 AM  Performed by: John Valentine MD  Authorized by: John Valentine MD     ECG reviewed by ED Physician in the absence of a cardiologist: yes    Interpretation:     Interpretation: abnormal    Rate:     ECG rate:  46    ECG rate assessment: bradycardic    Rhythm:     Rhythm: sinus bradycardia    Ectopy:     Ectopy: none    QRS:     QRS axis:  Normal    QRS intervals:  Normal    QRS conduction: normal    ST segments:     ST segments:  Normal  T waves:     T waves: normal      DIAGNOSTIC RESULTS   EKG:All EKG's are interpreted by the Emergency Department Physician who either signs or Co-signs this chart in the absence of a cardiologist.        RADIOLOGY:All plain film, CT, MRI, and formal ultrasound images (except ED bedside ultrasound) are read by the radiologist, see reports below, unless otherwisenoted in MDM or here. XR CHEST PORTABLE    (Results Pending)     LABS: All lab results were reviewed by myself, and all abnormals are listed below.   Labs Reviewed   CBC WITH AUTO DIFFERENTIAL - Abnormal; Notable for the following components:       Result Value    Monocytes 9 (*)     All other components within normal limits   BASIC METABOLIC PANEL - Abnormal; Notable for the following components:    Creatinine 0.93 (*)     Potassium 3.6 (*)     Chloride 109 (*)     All other components within normal limits   T4, FREE - Abnormal; Notable for the following components:    Thyroxine, Free 0.89 (*)     All other components within normal limits   MAGNESIUM   TROPONIN   TSH       EMERGENCY DEPARTMENTCOURSE:     Patient seen and evaluated for migraine    Having persistent migraine despite medication here    Status migrainosus    Will admit for this    Also noted to be bradycardic with lightheadedness    This resolved after 1 dose of atropine but will admit for telemetry monitoring        Vitals:    Vitals:    07/21/22 0445 07/21/22 0503 07/21/22 0530 07/21/22 0539   BP: 136/72 112/70 115/80 120/77   Pulse: (!) 43 (!) 46 62 55   Resp: 16 16 16 16   Temp:       TempSrc:       SpO2: 99% 97% 94% 96%   Weight:       Height:           The patient was given the following medications while in the emergency department:  Orders Placed This Encounter   Medications    0.9 % sodium chloride bolus    DISCONTD: 0.9 % sodium chloride bolus    ketorolac (TORADOL) injection 30 mg    diphenhydrAMINE (BENADRYL) injection 25 mg    ondansetron (ZOFRAN) injection 4 mg    atropine 1 MG/10ML injection     KIM MARSH: cabinet override    atropine injection 0.5 mg    0.9 % sodium chloride bolus    dexamethasone (PF) (DECADRON) injection 10 mg    magnesium sulfate 1000 mg in dextrose 5% 100 mL IVPB    LORazepam (ATIVAN) injection 0.5 mg    sodium chloride flush 0.9 % injection 5-40 mL    sodium chloride flush 0.9 % injection 5-40 mL    0.9 % sodium chloride infusion    enoxaparin Sodium (LOVENOX) injection 30 mg     Order Specific Question:   Indication of Use     Answer:   Prophylaxis-DVT/PE    acetaminophen (TYLENOL) tablet 650 mg    OR Linked Order Group     ondansetron (ZOFRAN-ODT) disintegrating tablet 4 mg     ondansetron (ZOFRAN) injection 4 mg     CONSULTS:  IP CONSULT TO INTERNAL MEDICINE    FINAL IMPRESSION      1. Symptomatic bradycardia    2. Classical migraine with status migrainosus          DISPOSITION/PLAN   DISPOSITION Admitted 07/21/2022 06:05:06 AM      PATIENT REFERRED TO:  No follow-up provider specified. DISCHARGE MEDICATIONS:  New Prescriptions    No medications on file     The care is provided during an unprecedented national emergency due to the novel coronavirus, COVID 19.   MD Morales Rocha MD  07/21/22 0740

## 2022-07-21 NOTE — H&P
Hospital Medicine  History and Physical                                                                                                                                                 Full Code    Patient:  Vick Hutchison  MRN: [id number]                                                                                                                                                                     CHIEF COMPLAINT:  migraine    History Obtained From:  patient  PCP: Chai Retana MD    HISTORY OF PRESENT ILLNESS:   The patient is a 39 y.o. female who presents with h/o of migraine for last few days, pt says this present episode started few days back.  , pt also states with hyst her symptom did get better, pt has been having pain lt temple, pt has been topamax,     Past Medical History:        Diagnosis Date    Acute medial meniscus tear of right knee     Allergies     dust, cockroaches, cigarettes, spider bites   Dr. Lashawn Brito Allergist    Arthritis     right knee    Kidney stone 6/8/2016    Kidney stones     Liver mass     Migraine     POTS (postural orthostatic tachycardia syndrome)     Dr. Juan Antonio Schmidtc. V last visit 6/2021    Seizures ([address]     started 3/04/2021 Dr. Zuleyma Toney. last visit 9/02/2021    Wellness examination 07/14/2021    Jonathan Ibanez CNP with Dr. Jevon Denney       Past Surgical History:        Procedure Laterality Date    APPENDECTOMY      CHOLECYSTECTOMY      CYSTOSCOPY  06/068/2016    lt ureteroscopy with holmium laser lithotripsy and lt ureteral stent    ENDOMETRIAL ABLATION      HYSTERECTOMY (CERVIX STATUS UNKNOWN)      partial    KNEE ARTHROSCOPY Right 10/19/2021    KNEE ARTHROSCOPY, PARTIAL MEDIAL MENISECTOMY, PARTIAL LATERAL RELEASE    KNEE ARTHROSCOPY Right 10/19/2021    KNEE ARTHROSCOPY, PARTIAL MEDIAL MENISECTOMY, PARTIAL LATERAL RELEASE  (3080 TABLE, SUPINE) performed by Nathanael Meigs, DO at [address] [facility]         Medications Prior to Admission:    Prior to Admission medications    Medication Sig Start Date End Date Taking? Authorizing Provider   meloxicam (MOBIC) 15 MG tablet Take 1 tablet by mouth in the morning. (Avoid taking mobid with treximet together). 7/20/22   ANA Parks CNP   SUMAtriptan-Naproxen Sodium (TREXIMET)  MG TABS tablet Take one tab po at onset of migraine, may repeat once in 2 hours. Maximum 2 tabs in 24 hours.  7/20/22   ANA Parks CNP   Azelastine HCl 137 MCG/SPRAY SOLN USE 1 TO 2 DOSES IN EACH NOSTRIL TWICE DAILY NEED TO USE REGULARLY FOR BENEFIT 6/7/22   Historical Provider, MD   metoprolol succinate (TOPROL XL) 25 MG extended release tablet  7/6/22   Historical Provider, MD   midodrine (PROAMATINE) 5 MG tablet  7/6/22   Historical Provider, MD   topiramate (TOPAMAX) 100 MG tablet Take 1 1/2 po bid 6/14/22   Carlos Preciado MD   cyanocobalamin ([employer]S VITAMIN B12) 1000 MCG tablet Take 1 tablet by mouth daily 5/2/22   ANA Parks CNP   vitamin D (ERGOCALCIFEROL) 1.25 MG (30353 UT) CAPS capsule Take one cap po twice weekly for 3 months, then go back to once weekly 12/6/21   ANA Parks CNP   BIOTIN 5000 PO Take by mouth    Historical Provider, MD   Fluticasone Propionate (FLONASE ALLERGY RELIEF NA) by Nasal route    Historical Provider, MD   rizatriptan (MAXALT) 10 MG tablet Take 1 tablet by mouth once as needed for Migraine May repeat in 2 hours if needed 6/21/21 1/6/22  Kimberly Goode MD   cetirizine (ZYRTEC) 10 MG tablet Take 10 mg by mouth daily  3/4/21   Historical Provider, MD       Current meds  Scheduled Meds:   sodium chloride flush  5-40 mL IntraVENous 2 times per day    enoxaparin  30 mg SubCUTAneous BID    midodrine  5 mg Oral TID     vitamin B-12  1,000 mcg Oral Daily    meloxicam  15 mg Oral Daily    topiramate  100 mg Oral Daily    cetirizine  10 mg Oral Daily    fluticasone  1 spray Each Nostril Daily    valproate sodium (DEPACON) IVPB  1,000 mg IntraVENous Once    azelastine  1 spray Each Nostril BID     Continuous Infusions:   sodium chloride      sodium chloride 100 mL/hr at 07/21/22 0928     PRN Meds:.sodium chloride flush, sodium chloride, acetaminophen, ondansetron **OR** ondansetron, butalbital-APAP-caffeine    Allergies: Other    Social History:   TOBACCO:   reports that she has never smoked. She has never used smokeless tobacco.  ETOH:   reports no history of alcohol use. OCCUPATION:      Family History:       Problem Relation Age of Onset    Thyroid Disease Mother     Uterine Cancer Mother        REVIEW OF SYSTEMS:  Constitutional:  negative for  fevers, chills, sweats and weight loss  HEENT:  negative for  hearing loss, ear drainage, nasal congestion, snoring, hoarseness and voice change  Neck:  No swollen glands and No h/o goiter or thyroid disease  Cardiac:  negative for  chest pain, dyspnea, palpitations, orthopnea, PND, edema  Respiratory:  negative for  dry cough, cough with sputum, dyspnea, wheezing and hemoptysis  Gastrointestinal:  negative for nausea, vomiting, change in bowel habits, diarrhea, constipation, abdominal pain and hemtochezia  :  negative for frequency, dysuria, urinary incontinence, hesitancy, decreased stream and hematuria  Musculoskeletal:  negative for  myalgias, arthralgias, joint swelling and stiff joints  Neuro:  positive  for headaches, negative fordizziness, seizures, memory problems, visual disturbance, weakness and syncope      Physical Exam:    Vitals: /74   Pulse 76   Temp 97.8 °F (36.6 °C) (Oral)   Resp 16   Ht 5' 5\" (1.651 m)   Wt 253 lb (114.8 kg)   SpO2 99%   BMI 42.10 kg/m²   General appearance: alert, appears stated age and cooperative  Skin: Skin color, texture, turgor normal. No rashes or lesions  HEENT: Head: Normocephalic, no lesions, without obvious abnormality.   Eye: Normal external eye, conjunctiva, lids cornea, ROSIO  Neck: no adenopathy, no carotid bruit, no JVD, supple, symmetrical, trachea midline and thyroid not enlarged, symmetric, no tenderness/mass/nodules  Lungs: clear to auscultation bilaterally  Heart: regular rate and rhythm, S1, S2 normal, no murmur, click, rub or gallop  Abdomen: soft, non-tender; bowel sounds normal; no masses,  no organomegaly  Extremities: extremities normal, atraumatic, no cyanosis or edema  Neurologic: Mental status: Alert, oriented, thought content appropriate    CBC:   Recent Labs     07/21/22  0425   WBC 7.5   HGB 13.3        BMP:    Recent Labs     07/21/22  0425      K 3.6*   *   CO2 23   BUN 15   CREATININE 0.93*   GLUCOSE 92     Hepatic: No results for input(s): AST, ALT, ALB, BILITOT, ALKPHOS in the last 72 hours. Troponin: No results for input(s): TROPONINI in the last 72 hours. BNP: No results for input(s): BNP in the last 72 hours. Lipids: No results for input(s): CHOL, HDL in the last 72 hours. Invalid input(s): LDLCALCU  ABGs: No results found for: PHART, PO2ART, [id number]  INR: No results for input(s): INR in the last 72 hours. -----------------------------------------------------------------  PA/lat CXR: XR CHEST PORTABLE    Result Date: 7/21/2022  EXAMINATION: ONE XRAY VIEW OF THE CHEST 7/21/2022 5:39 am COMPARISON: 10/08/2021 HISTORY: ORDERING SYSTEM PROVIDED HISTORY: palpitations TECHNOLOGIST PROVIDED HISTORY: palpitations Reason for Exam: PT CO palpitations with migraine X 3 days. Low BP. FINDINGS: The lungs are hypoinflated. The heart and central vasculature will be exaggerated by the technique and hypoinflation. Current mild central vascular prominence could be artifactual versus a minimal congestion. No consolidation, pleural effusion, or pneumothorax. No lines or tubes in the chest.  No acute fractures are identified. Hypoinflation of the lungs will exaggerate the heart and vasculature. The mild central vascular prominence could be due to exaggeration or mild congestion/volume overload.  No consolidation, pleural effusion, or pneumothorax. Prophylaxis:   DVT with  [x] lovenox        [] heparin        [] Scd        [] none:     Assessment and Plan   Migraine/  H/o of renal stone  Pt started on depokote/neuro  on board    Patient Active Problem List   Diagnosis Code    Kidney stone N20.0    Vaginitis N76.0    Tinea corporis B35.4    Menorrhagia N92.0    Dysmenorrhea N94.6    Encounter for sterilization Z30.2    Episode of unresponsiveness R41.89    POTS (postural orthostatic tachycardia syndrome) I49.8    Complex tear of medial meniscus of right knee S83.231A    Mixed hyperlipidemia E78.2    B12 deficiency E53.8    Vitamin D deficiency E55.9    Seizure disorder ([facility] G40.909    Migraine without aura and without status migrainosus, not intractable G43.009    Morbid obesity with BMI of 40.0-44.9, adult ([facility]) E66.01, Z68.41    Migraine with aura, intractable, with status migrainosus G43. 111       Anticipated Disposition upon discharge: [] Home                                                                         [] Home with Home Health                                                                         [] [facility]                                                                         [] [address]          Patient is admitted as inpatient status because of co-morbidities listed above, severity of signs and symptoms as outlined, requirement for current medical therapies and most importantly because of direct risk to patient if care not provided in a hospital setting.     Andria Stovall MD, MD  Admitting Hospitalist

## 2022-07-21 NOTE — PROGRESS NOTES
Dr. María Elena Fox notified that pts. Med list updated and home meds ready to be re started. Orders placed.

## 2022-07-21 NOTE — PROGRESS NOTES
Writer paged Dr. Reyna Rocha to address pt. Concerns. Pt. States she feels cardiology did not address her bradycardia and would like to be seen by Dr. Chitra Ruiz. Pt. Also upset feeling like nothing has been done for her and she is just being \"pumped full of meds\" Writer attempted to console pt. And explained that the phycians were trying to find things that would help her migraines and could re address if nothing is providing adequate relief. Pt. Then states she is upset that not all her home medications have been re ordered. Writer asked pt. What meds she is concerned about and writer explained that some of these meds have not been re ordered due to pts. Low heart rate. Pt. Encouraged to call out with any further needs.

## 2022-07-21 NOTE — PROGRESS NOTES
Writer paged Dr. Usha Cervantes because pt. Stating she has had no relief from migraine. Orders for fiorcet placed.

## 2022-07-21 NOTE — CARE COORDINATION
CASE MANAGEMENT NOTE:    Admission Date:  7/21/2022 Lorna Almodovar is a 39 y.o.  female    Admitted for : Symptomatic bradycardia [R00.1]  Migraine with aura, intractable, with status migrainosus [G43.111]  Classical migraine with status migrainosus [G43.101]    Met with:  Patient    PCP:  Dr. Christine Dorsey:  Ana Crump      Is patient alert and oriented at time of discussion:  Yes    Current Residence/ Living Arrangements:  independently at home  with daughter           Current Services PTA:  No    Does patient go to outpatient dialysis: No  If yes, location and chair time: n/a    Is patient agreeable to VNS: No    Freedom of choice provided:  Yes    List of [address] provided: No    VNS chosen:  NA    DME:  none    Home Oxygen: No    Nebulizer: No    CPAP/BIPAP: No    Supplier: N/A    Potential Assistance Needed: No    SNF needed: No    Freedom of choice and list provided: NA    Pharmacy:  [facility] in [place]       Is patient currently receiving oral anticoagulation therapy? No    Is the Patient an LIGIA SUBRAMANIAN [facility] with Readmission Risk Score greater than 14%? No  If yes, pt needs a follow up appointment made within 7 days. Family Members/Caregivers that pt would like involved in their care:    Yes    If yes, list name here:  [address]    Transportation Provider:  Patient and Family             Discharge Plan:  home without needs.                  Electronically signed by: Monica Souza RN on 7/21/2022 at 12:27 PM

## 2022-07-21 NOTE — CONSULTS
Date:   7/21/2022  Patient name: Shiloh Gaviria  Date of admission:  7/21/2022  3:18 AM  MRN:   [id number]  YOB: 1980  PCP: Mar Godoy MD    Reason for Admission: Symptomatic bradycardia [R00.1]  Migraine with aura, intractable, with status migrainosus [G43.111]  Classical migraine with status migrainosus [G43.101]    Cardiology consult : Bradycardia       Referring physician: Dr Noe Molina    Impression    Sinus bradycardia patient is on midodrine and also has low free T4  Preop POTS  H/o migraine headache  History of seizure disorder  Obesity BMI 42  Renal stone    Past surgical history appendectomy, cholecystectomy, endometrial ablation, hysterectomy, knee surgery, liver biopsy and cystoscopy        History of present illness    39years old female with a past medical history of postural orthostatic tachycardia syndrome, obesity, migraine headache, seizure disorder got admitted with a severe migraine headache. Since admission she is ECG monitor has shown episodes of bradycardia heart rate below 40. Has been on midodrine 5 mg 3 times a day and also on metoprolol.   No pauses      Chest x-ray on admission showed hypoinflation of the lungs mild central vascular prominence no consolidation or pleural effusion or pneumothorax  ECG 7/21/2022 showed sinus bradycardia rate 46, normal OK, QRS and QT interval  Lab work 7/21/2021  Sodium 141, potassium 3.6, BUN 15, creatinine 0.93, magnesium 1.9, glucose 92  High-sensitivity troponin less than 6  TSH 3.57, free T4 0.89  Hemoglobin 13.3, WBC 7.5, platelets 076    Medications:   Scheduled Meds:   atropine        sodium chloride flush  5-40 mL IntraVENous 2 times per day    enoxaparin  30 mg SubCUTAneous BID    midodrine  5 mg Oral TID     vitamin B-12  1,000 mcg Oral Daily    meloxicam  15 mg Oral Daily    topiramate  100 mg Oral Daily     Continuous Infusions:   sodium chloride      sodium chloride 100 mL/hr at 07/21/22 0928     CBC:   Recent Labs 07/21/22  0425   WBC 7.5   HGB 13.3        BMP:    Recent Labs     07/21/22  0425      K 3.6*   *   CO2 23   BUN 15   CREATININE 0.93*   GLUCOSE 92     Hepatic: No results for input(s): AST, ALT, ALB, BILITOT, ALKPHOS in the last 72 hours. Troponin: No results for input(s): TROPONINI in the last 72 hours. BNP: No results for input(s): BNP in the last 72 hours. Lipids: No results for input(s): CHOL, HDL in the last 72 hours. Invalid input(s): LDLCALCU  INR: No results for input(s): INR in the last 72 hours. Objective:   Vitals: /75   Pulse (!) 47   Temp 97.9 °F (36.6 °C)   Resp 16   Ht 5' 5\" (1.651 m)   Wt 253 lb (114.8 kg)   SpO2 99%   BMI 42.10 kg/m²   General appearance: alert and cooperative with exam  HEENT: Head: Normal, normocephalic, atraumatic.   Neck: no carotid bruit, no JVD, and supple, symmetrical, trachea midline  Lungs: diminished breath sounds bibasilar  Heart: regular rate and rhythm  Abdomen:  Obese bowel sounds present  Extremities: Homans sign is negative, no sign of DVT  Neurologic: Mental status: Alert, oriented, thought content appropriate        Assessment / Acute Cardiac Problems:   Sinus bradycardia heart rate sometimes below 40  No pauses  History of postural orthostatic tachycardia syndrome  Patient admitted with severe headache  Borderline abnormal free T4    Patient Active Problem List:     Kidney stone     Vaginitis     Tinea corporis     Menorrhagia     Dysmenorrhea     Encounter for sterilization     Episode of unresponsiveness     POTS (postural orthostatic tachycardia syndrome)     Complex tear of medial meniscus of right knee     Mixed hyperlipidemia     B12 deficiency     Vitamin D deficiency     Seizure disorder (HCC)     Migraine without aura and without status migrainosus, not intractable     Morbid obesity with BMI of 40.0-44.9, adult (HCC)     Migraine with aura, intractable, with status migrainosus      Plan of Treatment: Medications reviewed  Continue with current medication  Continue with ECG monitoring  Repeat thyroid function test if free T4 is low patient may need low-dose levothyroxine      Electronically signed by Tamar Lagos MD on 7/21/2022 at 11:14 AM

## 2022-07-21 NOTE — ED NOTES
Dr. Nancy Gomes informed patient's monitor shows sinus bradycardia rate 37 to 42  and patient complaining of feeling lightheaded and Atropine ordered. Thyroid labs added.    Satinder Guerrier RN  07/21/22 [place], RN  07/21/22 4730

## 2022-07-21 NOTE — CONSULTS
Dr. Alphonso Tafoya Allergist    Arthritis     right knee    Kidney stone 6/8/2016    Kidney stones     Liver mass     Migraine     POTS (postural orthostatic tachycardia syndrome)     Dr. Brooks Randle V last visit 6/2021    Seizures ([address]     started 3/04/2021 Dr. Pramod Olvera. last visit 9/02/2021    Wellness examination 07/14/2021    Rohan Clarke CNP with Dr. Reginaldo Hill        Past Surgical History:     Past Surgical History:   Procedure Laterality Date    APPENDECTOMY      CHOLECYSTECTOMY      CYSTOSCOPY  06/068/2016    lt ureteroscopy with holmium laser lithotripsy and lt ureteral stent    ENDOMETRIAL ABLATION      HYSTERECTOMY (CERVIX STATUS UNKNOWN)      partial    KNEE ARTHROSCOPY Right 10/19/2021    KNEE ARTHROSCOPY, PARTIAL MEDIAL MENISECTOMY, PARTIAL LATERAL RELEASE    KNEE ARTHROSCOPY Right 10/19/2021    KNEE ARTHROSCOPY, PARTIAL MEDIAL MENISECTOMY, PARTIAL LATERAL RELEASE  (3080 TABLE, SUPINE) performed by Abraham Boyle DO at [address] [facility]          Medications Prior to Admission:     Prior to Admission medications    Medication Sig Start Date End Date Taking? Authorizing Provider   meloxicam (MOBIC) 15 MG tablet Take 1 tablet by mouth in the morning. (Avoid taking mobid with treximet together). 7/20/22   ANA Muniz - CNP   SUMAtriptan-Naproxen Sodium (TREXIMET)  MG TABS tablet Take one tab po at onset of migraine, may repeat once in 2 hours. Maximum 2 tabs in 24 hours.  7/20/22   ANA Muniz - CNP   Azelastine HCl 137 MCG/SPRAY SOLN USE 1 TO 2 DOSES IN EACH NOSTRIL TWICE DAILY NEED TO USE REGULARLY FOR BENEFIT 6/7/22   Historical Provider, MD   metoprolol succinate (TOPROL XL) 25 MG extended release tablet  7/6/22   Historical Provider, MD   midodrine (PROAMATINE) 5 MG tablet  7/6/22   Historical Provider, MD   topiramate (TOPAMAX) 100 MG tablet Take 1 1/2 po bid 6/14/22   Brittany Monroe MD   cyanocobalamin ([employer] VITAMIN B12) 1000 MCG tablet Take 1 tablet by mouth daily 5/2/22   ANA Hollis CNP   vitamin D (ERGOCALCIFEROL) 1.25 MG (59941 UT) CAPS capsule Take one cap po twice weekly for 3 months, then go back to once weekly 12/6/21   ANA Hollis CNP   BIOTIN 5000 PO Take by mouth    Historical Provider, MD   Fluticasone Propionate (FLONASE ALLERGY RELIEF NA) by Nasal route    Historical Provider, MD   rizatriptan (MAXALT) 10 MG tablet Take 1 tablet by mouth once as needed for Migraine May repeat in 2 hours if needed 6/21/21 1/6/22  Lanny De Los Santos MD   cetirizine (ZYRTEC) 10 MG tablet Take 10 mg by mouth daily  3/4/21   Historical Provider, MD        Allergies: Other    Social History:     Tobacco:    reports that she has never smoked. She has never used smokeless tobacco.  Alcohol:      reports no history of alcohol use. Drug Use:  reports no history of drug use. Family History:     Family History   Problem Relation Age of Onset    Thyroid Disease Mother     Uterine Cancer Mother        Review of Systems:       Constitutional Negative for fever and chills   HEENT Negative for ear discharge, ear pain, nosebleed. Negative for photophobia, positive for headache positive for photophobia   Musculoskeletal Negative for joint pain, negative for myalgia   Respiratory Negative for cough, dyspnea. Negative for hemoptysis and sputum. Cardiovascular Negative for palpitations, chest pain. Negative for orthopnea, claudication. Patient has POTS which limits some of the treatment options. She does take midodrine and salt to try and maintain an adequate blood pressure. Typically she is 754-232 systolic over 70 diastolic   Gastrointestinal Negative for vomiting. Positive for mild nausea negative for abdominal pain, diarrhea, blood in stool   Genitourinary  Negative for dysuria, hematuria. Negative for suprapubic pain. Negative for bladder incontinence.     Skin Negative for rash or itching   Hematology Negative for ecchymosis, anemia   Psychiatric Negative for anxiety, depression. Negative for suicidal ideation, hallucinations         Physical Exam:   /74   Pulse 76   Temp 97.8 °F (36.6 °C) (Oral)   Resp 16   Ht 5' 5\" (1.651 m)   Wt 253 lb (114.8 kg)   SpO2 99%   BMI 42.10 kg/m²   Temp (24hrs), Av.9 °F (36.6 °C), Min:97.8 °F (36.6 °C), Max:97.9 °F (36.6 °C)    General examination:      General Appearance:  alert, appears to have mild to moderate distress due to headache limiting her movement   HEENT: Normocephalic, atraumatic  Neck: (-) nuchal rigidity  Lungs:  Respirations unlabored,  Cardiovascular: normal rate, regular rhythm  Abdomen: obese  Skin: No gross lesions, rashes, bruising or bleeding on exposed skin area  Extremities:  peripheral pulses palpable, no cyanosis, clubbing or edema  Psych: normal affect      Neurological examination:    Mental status   Alert and oriented x 3; following all commands; speech is fluent, no dysarthria, aphasia. Normal receptive expression repetition and naming. Insight and judgment appears intact. Memory is 3/3. Cranial nerves   II - visual fields intact to confrontation; pupils reactive. Pupil 4mm  III, IV, VI - extraocular muscles intact; no MAURO; no nystagmus; no ptosis   V - normal facial sensation                                                               VII - normal facial symmetry                                                             VIII - intact hearing                                                                             IX, X -normal phonation                                             XI -normal head movements                                                  XII - midline tongue   No visual obscurations     Motor function  Strength:  no focal weakness noted  Normal bulk and tone. No tremors                      Sensory function Intact to touch, vibration throughout     Cerebellar No tremors at this moment. Normal finger-nose. Normal tone   Reflex function 1/4 symmetric throughout . Downgoing plantar response bilaterally. (-)Dunbar's sign bilaterally    Gait                  Not tested due to headache       Diagnostics:      Laboratory Testing:  CBC:   Recent Labs     07/21/22 0425   WBC 7.5   HGB 13.3        BMP:    Recent Labs     07/21/22 0425      K 3.6*   *   CO2 23   BUN 15   CREATININE 0.93*   GLUCOSE 92         Lab Results   Component Value Date    CHOL 171 03/25/2022    LDLCHOLESTEROL 115 03/25/2022    HDL 44 03/25/2022    TRIG 58 03/25/2022    ALT 16 03/25/2022    AST 15 03/25/2022    TSH 3.57 07/21/2022    INR 1.0 07/24/2015    LABA1C 5.3 10/28/2019    [id number] 660 03/25/2022       No results found for: PHENYTOIN, PHENYTOIN, VALPROATE, CBMZ      Impression:      Migraine headache for 4 days. Left temporal lobe seizure focus identified by EEG according to patient  Patient would like to change from Topamax because she feels it is causing her hair to fall out. Patient has a kidney stone which is currently being evaluated and has had kidney stones before. Topamax may contribute to the formation of kidney stones. Patient has failed her usual abortive treatment with Maxalt. Other antiheadache medications have failed thus far for this particular episode. She currently has a headache once or twice per year. Plan:     Depacon 1000 mg IV x1 to see if this will terminate headache  Patient plans to see a new neurologist soon when she can get an appointment for purposes of changing from Topamax to another medication. I will follow this case with you. Thank you for this very interesting consultation.       Electronically signed by Vianca Nobles MD on 7/21/2022 at 3:41 PM      Vianca Nobles MD  [facility]  Neurology

## 2022-07-22 ENCOUNTER — APPOINTMENT (OUTPATIENT)
Dept: CT IMAGING | Age: 42
DRG: 054 | End: 2022-07-22
Payer: COMMERCIAL

## 2022-07-22 VITALS
HEIGHT: 65 IN | RESPIRATION RATE: 18 BRPM | BODY MASS INDEX: 42.15 KG/M2 | TEMPERATURE: 97.7 F | DIASTOLIC BLOOD PRESSURE: 79 MMHG | SYSTOLIC BLOOD PRESSURE: 118 MMHG | WEIGHT: 253 LBS | OXYGEN SATURATION: 98 % | HEART RATE: 54 BPM

## 2022-07-22 LAB
T3 TOTAL: 53 NG/DL (ref 60–181)
T4 TOTAL: 5.5 UG/DL (ref 4.5–10.9)
TSH SERPL DL<=0.05 MIU/L-ACNC: 0.7 UIU/ML (ref 0.3–5)

## 2022-07-22 PROCEDURE — 6360000002 HC RX W HCPCS: Performed by: FAMILY MEDICINE

## 2022-07-22 PROCEDURE — 74176 CT ABD & PELVIS W/O CONTRAST: CPT

## 2022-07-22 PROCEDURE — 6370000000 HC RX 637 (ALT 250 FOR IP): Performed by: NURSE PRACTITIONER

## 2022-07-22 PROCEDURE — 6370000000 HC RX 637 (ALT 250 FOR IP): Performed by: FAMILY MEDICINE

## 2022-07-22 RX ORDER — BUTALBITAL, ACETAMINOPHEN AND CAFFEINE 300; 40; 50 MG/1; MG/1; MG/1
1 CAPSULE ORAL EVERY 6 HOURS PRN
Qty: 9 CAPSULE | Refills: 0 | Status: SHIPPED | OUTPATIENT
Start: 2022-07-22 | End: 2022-08-04

## 2022-07-22 RX ADMIN — ENOXAPARIN SODIUM 30 MG: 100 INJECTION SUBCUTANEOUS at 08:53

## 2022-07-22 RX ADMIN — TOPIRAMATE 100 MG: 100 TABLET, FILM COATED ORAL at 10:46

## 2022-07-22 RX ADMIN — FLUTICASONE PROPIONATE 1 SPRAY: 50 SPRAY, METERED NASAL at 12:45

## 2022-07-22 RX ADMIN — MIDODRINE HYDROCHLORIDE 5 MG: 5 TABLET ORAL at 11:25

## 2022-07-22 RX ADMIN — MIDODRINE HYDROCHLORIDE 5 MG: 5 TABLET ORAL at 06:19

## 2022-07-22 RX ADMIN — CETIRIZINE HYDROCHLORIDE 10 MG: 10 TABLET, FILM COATED ORAL at 08:52

## 2022-07-22 RX ADMIN — ONDANSETRON 4 MG: 2 INJECTION INTRAMUSCULAR; INTRAVENOUS at 00:01

## 2022-07-22 NOTE — PROGRESS NOTES
Pt is requesting to be discharged and speak with attending. Pt is stating that no one has done anything for her and that she would be better off at home. RN has addressed all of pt nursing needs and house supervisor has been in to talk to the pt. RN explained that Pt cardiologist will be consulted to see her and that urology has ordered a CT for the AM.  Pt then stated that she does not feel safe here since she feels that the physicians have not addressed her needs. RN paged attending (Adi Delarosa). Mikki Thornton stated that pt will not be discharged tonight and will have to wait to see the attending in the morning.

## 2022-07-22 NOTE — CONSULTS
Urology Consultation    Patient:  Lisa Valenzuela  MRN: [id number]  YOB: 1980    CHIEF COMPLAINT:  kidney stones    HISTORY OF PRESENT ILLNESS:   The patient is a 39 y.o. female who presents with a migraine. She has a h/o kidney stones. She has been having some right flank pain. No fevers or chills. She has a known 2mm stone in the right kidney. No hematuria or dysuria. She has a CT ordered, which is due to be done today. Patient's old records, notes and chart reviewed and summarized above.     Past Medical History:    Past Medical History:   Diagnosis Date    Acute medial meniscus tear of right knee     Allergies     dust, cockroaches, cigarettes, spider bites   Dr. Myke Marino Allergist    Arthritis     right knee    Kidney stone 6/8/2016    Kidney stones     Liver mass     Migraine     POTS (postural orthostatic tachycardia syndrome)     Dr. Reina Cantu. V last visit 6/2021    Seizures ([address]     started 3/04/2021 Dr. Chance Valenzuela. last visit 9/02/2021    Wellness examination 07/14/2021    Bo Rendon CNP with Dr. Yoly Singletary       Past Surgical History:    Past Surgical History:   Procedure Laterality Date    APPENDECTOMY      CHOLECYSTECTOMY      CYSTOSCOPY  06/068/2016    lt ureteroscopy with holmium laser lithotripsy and lt ureteral stent    ENDOMETRIAL ABLATION      HYSTERECTOMY (CERVIX STATUS UNKNOWN)      partial    KNEE ARTHROSCOPY Right 10/19/2021    KNEE ARTHROSCOPY, PARTIAL MEDIAL MENISECTOMY, PARTIAL LATERAL RELEASE    KNEE ARTHROSCOPY Right 10/19/2021    KNEE ARTHROSCOPY, PARTIAL MEDIAL MENISECTOMY, PARTIAL LATERAL RELEASE  (3080 TABLE, SUPINE) performed by Darrin Beckwith DO at [address]       Previous  surgery:  [facility]    Medications:    Scheduled Meds:   sodium chloride flush  5-40 mL IntraVENous 2 times per day    enoxaparin  30 mg SubCUTAneous BID    fluticasone  1 spray Each Nostril Daily    meloxicam  15 mg Oral Nightly    topiramate  100 mg Oral BID    cetirizine  10 mg Oral BID    vitamin B-12  1,000 mcg Oral Nightly    midodrine  5 mg Oral 3 times per day     Continuous Infusions:   sodium chloride      sodium chloride 100 mL/hr at 07/21/22 2316     PRN Meds:.sodium chloride flush, sodium chloride, acetaminophen, ondansetron **OR** ondansetron, butalbital-APAP-caffeine    Allergies:   Other    Social History:    Social History     Socioeconomic History    Marital status:      Spouse name: Not on file    Number of children: Not on file    Years of education: Not on file    Highest education level: Not on file   Occupational History    Not on file   Tobacco Use    Smoking status: Never    Smokeless tobacco: Never   Vaping Use    Vaping Use: Never used   Substance and Sexual Activity    Alcohol use: No    Drug use: No    Sexual activity: Not on file   Other Topics Concern    Not on file   Social History Narrative    Not on file     Social Determinants of Health     Financial Resource Strain: Low Risk     Difficulty of Paying Living Expenses: Not hard at all   Food Insecurity: No Food Insecurity    Worried About Running Out of Food in the Last Year: Never true    Ran Out of Food in the Last Year: Never true   Transportation Needs: Not on file   Physical Activity: Not on file   Stress: Not on file   Social Connections: Not on file   Intimate Partner Violence: Not on file   Housing Stability: Not on file       Family History:    Family History   Problem Relation Age of Onset    Thyroid Disease Mother     Uterine Cancer Mother      Previous Urologic Family history: none  REVIEW OF SYSTEMS:  Constitutional: negative  Eyes: negative  Respiratory: negative  Cardiovascular: negative  Gastrointestinal: negative  Genitourinary: see HPI  Musculoskeletal: negative  Skin: negative   Neurological: headache  Hematological/Lymphatic: negative  Psychological: negative    Physical Exam:      This a 41 y.o. female   Patient Vitals for the past 24 hrs:   BP Temp Temp src Pulse Resp SpO2   07/22/22 0614 121/64 97.7 °F (36.5 °C) -- 55 17 99 %   07/21/22 2350 133/88 97.9 °F (36.6 °C) Oral 51 16 99 %   07/21/22 1931 -- -- -- (!) 35 -- --   07/21/22 1859 113/71 97.8 °F (36.6 °C) Oral 71 16 98 %   07/21/22 1220 115/74 97.8 °F (36.6 °C) Oral 76 16 99 %     Constitutional: Patient in no acute distress. Neuro: Alert and oriented to person, place and time. Psych: mood and affect normal  HEENT negative  Lungs: Respiratory effort is normal  Cardiovascular: Normal peripheral pulses  Abdomen: Soft, non-tender, non-distended with no CVA, flank pain or hepatosplenomegaly. No hernias. Kidneys normal.  Lymphatics: No palpable lymphadenopathy. Bladder non-tender and not distended. Pelvic exam: deferred  Rectal exam not indicated    LABS:   Recent Labs     07/21/22  0425   WBC 7.5   HGB 13.3   HCT 39.8   MCV 85.7        Recent Labs     07/21/22  0425      K 3.6*   *   CO2 23   BUN 15   CREATININE 0.93*       Additional Lab/culture results:    Urinalysis: No results for input(s): COLORU, PHUR, LABCAST, WBCUA, RBCUA, MUCUS, TRICHOMONAS, YEAST, BACTERIA, CLARITYU, SPECGRAV, LEUKOCYTESUR, UROBILINOGEN, BILIRUBINUR, BLOODU in the last 72 hours. Invalid input(s): NITRATE, GLUCOSEUKETONESUAMORPHOUS     -----------------------------------------------------------------  Imaging Results:  KUB last year reviewed, and negative. CT ordered, pending.      Assessment and Plan   Impression:    Patient Active Problem List   Diagnosis    Kidney stone    Vaginitis    Tinea corporis    Menorrhagia    Dysmenorrhea    Encounter for sterilization    Episode of unresponsiveness    POTS (postural orthostatic tachycardia syndrome)    Complex tear of medial meniscus of right knee    Mixed hyperlipidemia    B12 deficiency    Vitamin D deficiency    Seizure disorder (HCC)    Migraine without aura and without status migrainosus, not

## 2022-07-22 NOTE — DISCHARGE SUMMARY
Hospitalist Discharge Summary    Bowen Khadra  :  1980  MRN:  [id number]    Admit date:  2022  Discharge date:  22    Admitting Physician:  Jayla Mcrae MD    Discharge Diagnoses:   Patient Active Problem List   Diagnosis    Kidney stone    Vaginitis    Tinea corporis    Menorrhagia    Dysmenorrhea    Encounter for sterilization    Episode of unresponsiveness    POTS (postural orthostatic tachycardia syndrome)    Complex tear of medial meniscus of right knee    Mixed hyperlipidemia    B12 deficiency    Vitamin D deficiency    Seizure disorder (HCC)    Migraine without aura and without status migrainosus, not intractable    Morbid obesity with BMI of 40.0-44.9, adult (HCC)    Migraine with aura, intractable, with status migrainosus        Admission Condition:  fair      Discharged Condition:  fair    Hospital Course/Treatments   admitted with h/o of intractable headache, pt has h/o of migraine, pt was on topamax, pt did not want to take  it due to loss of hair, pt is under changing her neurology, pt was given 1 dose depokote which helped pt, pt also has kidney stones, pt was given fiorcet, pt wants to be d/c    Discharge Medications:         Medication List        START taking these medications      butalbital-APAP-caffeine -40 MG Caps per capsule  Take 1 capsule by mouth every 6 hours as needed for Headaches or Migraine     SUMAtriptan-Naproxen Sodium  MG Tabs tablet  Commonly known as: Treximet  Take one tab po at onset of migraine, may repeat once in 2 hours. Maximum 2 tabs in 24 hours. CHANGE how you take these medications      cyanocobalamin 1000 MCG tablet  Commonly known as: [employer] VITAMIN B12  Take 1 tablet by mouth daily  What changed: when to take this     meloxicam 15 MG tablet  Commonly known as: Mobic  Take 1 tablet by mouth in the morning. (Avoid taking mobid with treximet together).   What changed: when to take this     topiramate 100 MG tablet  Commonly known as: Topamax  Take 1 1/2 po bid  What changed:   how much to take  when to take this     vitamin D 1.25 MG (28042 UT) Caps capsule  Commonly known as: ERGOCALCIFEROL  Take one cap po twice weekly for 3 months, then go back to once weekly  What changed: additional instructions            CONTINUE taking these medications      Azelastine HCl 137 MCG/SPRAY Soln     BIOTIN 5000 PO     cetirizine 10 MG tablet  Commonly known as: ZYRTEC     FLONASE ALLERGY RELIEF NA     metoprolol succinate 25 MG extended release tablet  Commonly known as: TOPROL XL     midodrine 5 MG tablet  Commonly known as: PROAMATINE     rizatriptan 10 MG tablet  Commonly known as: Maxalt  Take 1 tablet by mouth once as needed for Migraine May repeat in 2 hours if needed               Where to Get Your Medications        You can get these medications from any pharmacy    Bring a paper prescription for each of these medications  butalbital-APAP-caffeine -40 MG Caps per capsule         Consults:  IP CONSULT TO INTERNAL MEDICINE  IP CONSULT TO CARDIOLOGY  IP CONSULT TO NEUROLOGY  IP CONSULT TO UROLOGY  IP CONSULT TO CARDIOLOGY    Significant Diagnostic Studies:  XR CHEST PORTABLE    Result Date: 7/21/2022  EXAMINATION: ONE XRAY VIEW OF THE CHEST 7/21/2022 5:39 am COMPARISON: 10/08/2021 HISTORY: ORDERING SYSTEM PROVIDED HISTORY: palpitations TECHNOLOGIST PROVIDED HISTORY: palpitations Reason for Exam: PT CO palpitations with migraine X 3 days. Low BP. FINDINGS: The lungs are hypoinflated. The heart and central vasculature will be exaggerated by the technique and hypoinflation. Current mild central vascular prominence could be artifactual versus a minimal congestion. No consolidation, pleural effusion, or pneumothorax. No lines or tubes in the chest.  No acute fractures are identified. Hypoinflation of the lungs will exaggerate the heart and vasculature.   The mild central vascular prominence could be due to exaggeration or mild congestion/volume overload. No consolidation, pleural effusion, or pneumothorax. Disposition:   home    Discharge Instructions: Activity: activity as tolerated  Diet:  regular diet    Follow up with Katie Deluca MD in 1 weeks.     Signed:  Preston Yao MD  7/22/2022, 10:36 AM    Time spent in discharge of this pt is more than 30 minutes in examination,evaluvation,  counseling and review of medication and discharge plan

## 2022-07-22 NOTE — PLAN OF CARE
Problem: Discharge Planning  Goal: Discharge to home or other facility with appropriate resources  7/22/2022 0322 by Ricardo Bonilla RN  Outcome: Progressing  7/22/2022 0322 by Ricardo Bonilla RN  Outcome: Progressing     Problem: Pain  Goal: Verbalizes/displays adequate comfort level or baseline comfort level  7/22/2022 0322 by Ricardo Bonilla RN  Outcome: Progressing  7/22/2022 0322 by Ricardo Bonilla RN  Outcome: Progressing     Problem: Safety - Adult  Goal: Free from fall injury  Outcome: Progressing

## 2022-07-22 NOTE — CARE COORDINATION
ONGOING DISCHARGE PLAN:    Patient is alert and oriented x4. Spoke with patient regarding discharge plan yesterday and patient confirmed that plan is home without needs. VNS has been declined. CT abd/pelvis ordered for today. Will continue to follow for additional discharge needs.     Electronically signed by Ana Michel RN on 7/22/2022 at 10:08 AM

## 2022-07-25 ENCOUNTER — OFFICE VISIT (OUTPATIENT)
Dept: FAMILY MEDICINE CLINIC | Age: 42
End: 2022-07-25
Payer: COMMERCIAL

## 2022-07-25 ENCOUNTER — HOSPITAL ENCOUNTER (OUTPATIENT)
Age: 42
Discharge: HOME | End: 2022-07-25
Payer: COMMERCIAL

## 2022-07-25 VITALS
RESPIRATION RATE: 14 BRPM | WEIGHT: 256.4 LBS | SYSTOLIC BLOOD PRESSURE: 108 MMHG | BODY MASS INDEX: 42.67 KG/M2 | HEART RATE: 68 BPM | TEMPERATURE: 97.8 F | DIASTOLIC BLOOD PRESSURE: 82 MMHG

## 2022-07-25 DIAGNOSIS — R00.1 BRADYCARDIA: ICD-10-CM

## 2022-07-25 DIAGNOSIS — E87.6 HYPOKALEMIA: ICD-10-CM

## 2022-07-25 DIAGNOSIS — G43.009 MIGRAINE WITHOUT AURA AND WITHOUT STATUS MIGRAINOSUS, NOT INTRACTABLE: Primary | ICD-10-CM

## 2022-07-25 DIAGNOSIS — Z09 HOSPITAL DISCHARGE FOLLOW-UP: ICD-10-CM

## 2022-07-25 LAB — POTASSIUM SERPL-SCNC: 3.8 MMOL/L (ref 3.7–5.3)

## 2022-07-25 PROCEDURE — 36415 COLL VENOUS BLD VENIPUNCTURE: CPT

## 2022-07-25 PROCEDURE — 1111F DSCHRG MED/CURRENT MED MERGE: CPT | Performed by: NURSE PRACTITIONER

## 2022-07-25 PROCEDURE — 99214 OFFICE O/P EST MOD 30 MIN: CPT | Performed by: NURSE PRACTITIONER

## 2022-07-25 PROCEDURE — 84132 ASSAY OF SERUM POTASSIUM: CPT

## 2022-07-25 RX ORDER — SUMATRIPTAN 50 MG/1
TABLET, FILM COATED ORAL
Qty: 9 TABLET | Refills: 1 | Status: SHIPPED | OUTPATIENT
Start: 2022-07-25

## 2022-07-25 RX ORDER — NAPROXEN 500 MG/1
500 TABLET ORAL 2 TIMES DAILY WITH MEALS
Qty: 30 TABLET | Refills: 0 | Status: SHIPPED | OUTPATIENT
Start: 2022-07-25 | End: 2022-07-26

## 2022-07-25 RX ORDER — CETIRIZINE HYDROCHLORIDE 10 MG/1
1 TABLET ORAL 2 TIMES DAILY
COMMUNITY
Start: 2022-07-09

## 2022-07-25 NOTE — PROGRESS NOTES
Post-Discharge Transitional Care  Follow Up      Lorna Almodovar   YOB: 1980    Date of Office Visit:  7/25/2022  Date of Hospital Admission: 7/21/22  Date of Hospital Discharge: 7/22/22  Risk of hospital readmission (high >=14%. Medium >=10%) :Readmission Risk Score: 6.7      Care management risk score Rising risk (score 2-5) and Complex Care (Scores >=6): No Risk Score On File     Non face to face  following discharge, date last encounter closed (first attempt may have been earlier): *No documented post hospital discharge outreach found in the last 14 days    Call initiated 2 business days of discharge: *No response recorded in the last 14 days    ASSESSMENT/PLAN:   Migraine without aura and without status migrainosus, not intractable  - cont topamax and start metrimet ( SUMAtriptan/ Naproxen ) 50 MG tablet; Take one tab po at onset of headache, may repeat once in 2 hours ( along with naproxen). Maximum 2 doses in 24 hours, Disp-9 tablet, R-1Normal    Bradycardia  - cont to monitor and follow up with cardiology as scheduled     Hypokalemia  -    cont to monitor and repeat  Potassium; Future    Medical Decision Making: moderate complexity  No follow-ups on file. Subjective:   HPI:  Follow up of Hospital problems/diagnosis(es):    39year old female presents with follow up s/p hospital discharge for symptomatic bradycardia, worsening migraine headache and hypokalemia with medication adjustment. Was admitted 4 days ago for uncontrolled migraine headache and noted to have heart rate at 35s. Blood tests were remarkable other than low potassium. CT abd were unremarkable. Pt was on metoprolol for POTS, which was discontinued at discharge. Heart rate is stable today. Pt states metrimet was not covered by insuranceis and she is scheduled to see a new neurologist.  States her migraine is slightly improved. Hx of seizures     Inpatient course: Discharge summary reviewed- see chart.     Interval history/Current status: stable     Patient Active Problem List   Diagnosis    Kidney stone    Vaginitis    Tinea corporis    Menorrhagia    Dysmenorrhea    Encounter for sterilization    Episode of unresponsiveness    POTS (postural orthostatic tachycardia syndrome)    Complex tear of medial meniscus of right knee    Mixed hyperlipidemia    B12 deficiency    Vitamin D deficiency    Seizure disorder (HCC)    Migraine without aura and without status migrainosus, not intractable    Morbid obesity with BMI of 40.0-44.9, adult ([facility] [address]    Migraine with aura, intractable, with status migrainosus    Bradycardia       Medications listed as ordered at the time of discharge from hospital     Medication List            Accurate as of July 25, 2022 11:59 PM. If you have any questions, ask your nurse or doctor. START taking these medications      naproxen 500 MG EC tablet  Commonly known as: EC NAPROSYN  Take 1 tablet by mouth in the morning and 1 tablet in the evening. Take with meals. Started by: ANA Romano CNP     SUMAtriptan 50 MG tablet  Commonly known as: IMITREX  Take one tab po at onset of headache, may repeat once in 2 hours ( along with naproxen). Maximum 2 doses in 24 hours  Started by: ANA Romano CNP            CONTINUE taking these medications      Azelastine HCl 137 MCG/SPRAY Soln     BIOTIN 5000 PO     butalbital-APAP-caffeine -40 MG Caps per capsule  Take 1 capsule by mouth every 6 hours as needed for Headaches or Migraine     cetirizine 10 MG tablet  Commonly known as: ZYRTEC     cyanocobalamin 1000 MCG tablet  Commonly known as: [employer] VITAMIN B12  Take 1 tablet by mouth daily     FLONASE ALLERGY RELIEF NA     meloxicam 15 MG tablet  Commonly known as: Mobic  Take 1 tablet by mouth in the morning. (Avoid taking mobid with treximet together). midodrine 5 MG tablet  Commonly known as: PROAMATINE     topiramate 100 MG tablet  Commonly known as:  Topamax  Take 1 1/2 po bid     vitamin D 1.25 MG (83455 UT) Caps capsule  Commonly known as: ERGOCALCIFEROL  Take one cap po twice weekly for 3 months, then go back to once weekly            STOP taking these medications      SUMAtriptan-Naproxen Sodium  MG Tabs tablet  Commonly known as: Treximet  Stopped by: ANA Carey CNP               Where to Get Your Medications        These medications were sent to [address] [place] [address], [address] [facility] [address]      Phone: [phone]   naproxen 500 MG EC tablet  SUMAtriptan 50 MG tablet           Medications marked \"taking\" at this time  Outpatient Medications Marked as Taking for the 7/25/22 encounter (Office Visit) with ANA Carey CNP   Medication Sig Dispense Refill    cetirizine (ZYRTEC) 10 MG tablet Take 1 tablet by mouth in the morning and at bedtime      SUMAtriptan (IMITREX) 50 MG tablet Take one tab po at onset of headache, may repeat once in 2 hours ( along with naproxen). Maximum 2 doses in 24 hours 9 tablet 1    [DISCONTINUED] naproxen (EC NAPROSYN) 500 MG EC tablet Take 1 tablet by mouth in the morning and 1 tablet in the evening. Take with meals. 30 tablet 0    [DISCONTINUED] meloxicam (MOBIC) 15 MG tablet Take 1 tablet by mouth in the morning. (Avoid taking mobid with treximet together). 30 tablet 0    Azelastine HCl 137 MCG/SPRAY SOLN 1-2 sprays 2 times daily      midodrine (PROAMATINE) 5 MG tablet 5 mg  in the morning and 5 mg at noon and 5 mg before bedtime. Pt takes at 0600, 1130, 1630.       topiramate (TOPAMAX) 100 MG tablet Take 1 1/2 po bid 90 tablet 0    cyanocobalamin ([employer] VITAMIN B12) 1000 MCG tablet Take 1 tablet by mouth daily 90 tablet 0    vitamin D (ERGOCALCIFEROL) 1.25 MG (90740 UT) CAPS capsule Take one cap po twice weekly for 3 months, then go back to once weekly 24 capsule 0    BIOTIN 5000 PO Take by mouth daily      Fluticasone Propionate (FLONASE ALLERGY RELIEF NA) 1 spray by

## 2022-07-26 ENCOUNTER — TELEPHONE (OUTPATIENT)
Dept: FAMILY MEDICINE CLINIC | Age: 42
End: 2022-07-26

## 2022-07-26 RX ORDER — NAPROXEN 500 MG/1
500 TABLET ORAL 2 TIMES DAILY WITH MEALS
Qty: 30 TABLET | Refills: 0 | Status: SHIPPED | OUTPATIENT
Start: 2022-07-26

## 2022-07-26 NOTE — TELEPHONE ENCOUNTER
Joselito Molina, We received a fax for clarification on Naproxen from [employer]. The Naproxen enteric coated that you sent is very expensive. Can you please resend for regular release for the patient. Thank you.

## 2022-07-31 PROBLEM — R00.1 BRADYCARDIA: Status: ACTIVE | Noted: 2022-07-31

## 2022-08-04 ENCOUNTER — OFFICE VISIT (OUTPATIENT)
Dept: UROLOGY | Age: 42
End: 2022-08-04
Payer: COMMERCIAL

## 2022-08-04 VITALS
RESPIRATION RATE: 17 BRPM | TEMPERATURE: 98.9 F | SYSTOLIC BLOOD PRESSURE: 126 MMHG | DIASTOLIC BLOOD PRESSURE: 86 MMHG | WEIGHT: 256 LBS | BODY MASS INDEX: 42.65 KG/M2 | HEIGHT: 65 IN | HEART RATE: 81 BPM

## 2022-08-04 DIAGNOSIS — R10.9 FLANK PAIN: ICD-10-CM

## 2022-08-04 DIAGNOSIS — N20.0 RENAL STONES: Primary | ICD-10-CM

## 2022-08-04 PROCEDURE — 1036F TOBACCO NON-USER: CPT | Performed by: UROLOGY

## 2022-08-04 PROCEDURE — G8427 DOCREV CUR MEDS BY ELIG CLIN: HCPCS | Performed by: UROLOGY

## 2022-08-04 PROCEDURE — 99213 OFFICE O/P EST LOW 20 MIN: CPT | Performed by: UROLOGY

## 2022-08-04 PROCEDURE — G8417 CALC BMI ABV UP PARAM F/U: HCPCS | Performed by: UROLOGY

## 2022-08-04 PROCEDURE — 1111F DSCHRG MED/CURRENT MED MERGE: CPT | Performed by: UROLOGY

## 2022-08-04 ASSESSMENT — ENCOUNTER SYMPTOMS
BACK PAIN: 0
EYE REDNESS: 0
WHEEZING: 0
CONSTIPATION: 0
COUGH: 0
EYE PAIN: 0
NAUSEA: 0
DIARRHEA: 0
SHORTNESS OF BREATH: 0
ABDOMINAL PAIN: 0
VOMITING: 0

## 2022-08-04 NOTE — PROGRESS NOTES
Review of Systems   Constitutional:  Negative for appetite change, chills and fever. Eyes:  Negative for pain, redness and visual disturbance. Respiratory:  Negative for cough, shortness of breath and wheezing. Cardiovascular:  Negative for chest pain and leg swelling. Gastrointestinal:  Negative for abdominal pain, constipation, diarrhea, nausea and vomiting. Genitourinary:  Negative for difficulty urinating, dysuria, flank pain, frequency, hematuria and urgency. Musculoskeletal:  Negative for back pain, joint swelling and myalgias. Skin:  Negative for rash and wound. Neurological:  Negative for dizziness, tremors and numbness. Hematological:  Does not bruise/bleed easily.    none

## 2022-08-04 NOTE — PROGRESS NOTES
[address] [address]  Dept: [address] [facility] Urology Office Note - Established    Patient:  Pearl Porter  YOB: 1980  Date: 8/4/2022    The patient is a 39 y.o. female whopresents today for evaluation of the following problems:   Chief Complaint   Patient presents with    Results     3 week w/ CT       HPI  Here for right flank pain, this is new. Worsening. No dysuria,no hematuria, no fevers, kub reviewed and neg  Ct obtained and neg. No hydro, no obs stone    Summary of old records: N/A    Additional History: N/A    Procedures Today: N/A    Urinalysis today:  No results found for this visit on 08/04/22. Imaging Reviewed during this Office Visit: none  (results were independently reviewed by physician and radiology report verified)    AUA Symptom Score (8/4/2022):   INCOMPLETE EMPTYING: How often have you had the sensation of not emptying your bladder?: Not at all  FREQUENCY: How often do you have to urinate less than every two hours?: Not at all  INTERMITTENCY: How often have you found you stopped and started again several times when you urinated?: Not at all  URGENCY: How often have you found it difficult to postpone urination?: Not at all  WEAK STREAM: How often have you had a weak urinary stream?: Not at all  STRAINING: How often have you had to strain to start  urination?: Not at all  NOCTURIA: How many times did you typically get up at night to uriniate?: NONE  TOTAL I-PSS SCORE[de-identified] 0       Last BUN and creatinine:  Lab Results   Component Value Date    BUN 15 07/21/2022     Lab Results   Component Value Date    CREATININE 0.93 (H) 07/21/2022       Additional Lab/Culture results: none    PAST MEDICAL, FAMILY AND SOCIAL HISTORY UPDATE:  Past Medical History:   Diagnosis Date    Acute medial meniscus tear of right knee     Allergies     dust, cockroaches, cigarettes, spider bites   Dr. Festus Palm Allergist    Arthritis     right knee    Kidney stone 6/8/2016    Kidney stones     Liver mass     Migraine     POTS (postural orthostatic tachycardia syndrome)     Dr. Brent Dowling. V last visit 6/2021    Seizures ([address]     started 3/04/2021 Dr. Chang Holloway. last visit 9/02/2021    Wellness examination 07/14/2021    Susan Cadet CNP with Dr. Debora Richey     Past Surgical History:   Procedure Laterality Date    APPENDECTOMY      CHOLECYSTECTOMY      CYSTOSCOPY  06/068/2016    lt ureteroscopy with holmium laser lithotripsy and lt ureteral stent    ENDOMETRIAL ABLATION      HYSTERECTOMY (CERVIX STATUS UNKNOWN)      partial    KNEE ARTHROSCOPY Right 10/19/2021    KNEE ARTHROSCOPY, PARTIAL MEDIAL MENISECTOMY, PARTIAL LATERAL RELEASE    KNEE ARTHROSCOPY Right 10/19/2021    KNEE ARTHROSCOPY, PARTIAL MEDIAL MENISECTOMY, PARTIAL LATERAL RELEASE  (3080 TABLE, SUPINE) performed by Court Tucker DO at [address] [facility]       Family History   Problem Relation Age of Onset    Thyroid Disease Mother     Uterine Cancer Mother      Outpatient Medications Marked as Taking for the 8/4/22 encounter (Office Visit) with Brielle Mathew MD   Medication Sig Dispense Refill    naproxen (NAPROSYN) 500 MG tablet Take 1 tablet by mouth in the morning and 1 tablet in the evening. Take with meals. 30 tablet 0    cetirizine (ZYRTEC) 10 MG tablet Take 1 tablet by mouth in the morning and at bedtime      SUMAtriptan (IMITREX) 50 MG tablet Take one tab po at onset of headache, may repeat once in 2 hours ( along with naproxen). Maximum 2 doses in 24 hours 9 tablet 1    Azelastine HCl 137 MCG/SPRAY SOLN 1-2 sprays 2 times daily      midodrine (PROAMATINE) 5 MG tablet 5 mg  in the morning and 5 mg at noon and 5 mg before bedtime. Pt takes at 0600, 1130, 1630.       topiramate (TOPAMAX) 100 MG tablet Take 1 1/2 po bid 90 tablet 0    cyanocobalamin ([employer] VITAMIN B12) 1000 MCG tablet Take 1 tablet by mouth daily 90 tablet 0    vitamin D (ERGOCALCIFEROL) 1.25 MG (17124 UT) CAPS capsule Take one cap po twice weekly for 3 months, then go back to once weekly 24 capsule 0    BIOTIN 5000 PO Take by mouth daily      Fluticasone Propionate (FLONASE ALLERGY RELIEF NA) 1 spray by Nasal route daily        (All medications reviewed and updated by provider sincelast office visit or hospitalization)   Other  Social History     Tobacco Use   Smoking Status Never   Smokeless Tobacco Never      (If patient a smoker, smoking cessation counseling offered)     Social History     Substance and Sexual Activity   Alcohol Use No       REVIEW OF SYSTEMS:  Review of Systems      Physical Exam:      Vitals:    08/04/22 1145   BP: 126/86   Pulse: 81   Resp: 17   Temp: 98.9 °F (37.2 °C)     Body mass index is 42.6 kg/m². Patient is a 39 y.o. female in noacute distress and alert and oriented to person, place and time. Physical Exam  Constitutional: Patient in no acute distress. Neuro: Alert andoriented to person, place and time. Psych: Mood normal, affect normal  Skin: No rash noted  HEENT: Head: Normocephalic and atraumatic  Conjunctivae and EOM are normal. Pupils are equal, round  Nose: Normal  Right External Ear: Normal; Left External Ear: Normal  Mouth: Mucosa Moist  Neck: Supple  Lungs: Respiratory effort is normal  Cardiovascular: Warm & Pink  Abdomen: Soft, non-tender, non-distended with no \      and Plan      1. Renal stones    2. Flank pain           Plan:      Return in about 6 months (around 2/4/2023) for Follow up. Prescriptions Ordered:  No orders of the defined types were placed in this encounter. Orders Placed:  Orders Placed This Encounter   Procedures    US RENAL LIMITED     This procedure can be scheduled via [employer]. Access your [employer] account by visiting [email or web address].      Standing Status:   Future     Standing Expiration Date:   7/30/2023            Wilmer Smith

## 2022-08-22 ENCOUNTER — TELEPHONE (OUTPATIENT)
Dept: FAMILY MEDICINE CLINIC | Age: 42
End: 2022-08-22

## 2022-08-22 RX ORDER — MELOXICAM 15 MG/1
15 TABLET ORAL DAILY
Qty: 30 TABLET | Refills: 0 | Status: SHIPPED | OUTPATIENT
Start: 2022-08-22 | End: 2022-09-23 | Stop reason: SDUPTHER

## 2022-08-22 NOTE — TELEPHONE ENCOUNTER
Confirmed with the patient that she is not to be taking Naprosyn when she takes Mobic. She states she only takes Naprosyn when she gets migraine headaches.

## 2022-08-22 NOTE — TELEPHONE ENCOUNTER
----- Message from Nita Huston sent at 8/22/2022  1:53 PM EDT -----  Subject: Refill Request    QUESTIONS  Name of Medication? Other - Meloxicam 15mg tablet  Patient-reported dosage and instructions? daily  How many days do you have left? 0  Preferred Pharmacy? [facility]  Pharmacy phone number (if available)? [phone]  ---------------------------------------------------------------------------  --------------  CALL BACK INFO  What is the best way for the office to contact you? OK to leave message on   voicemail  Preferred Call Back Phone Number? [phone]  ---------------------------------------------------------------------------  --------------  SCRIPT ANSWERS  Relationship to Patient?  Self

## 2022-09-23 RX ORDER — MELOXICAM 15 MG/1
15 TABLET ORAL DAILY
Qty: 30 TABLET | Refills: 0 | Status: SHIPPED | OUTPATIENT
Start: 2022-09-23

## 2022-11-10 DIAGNOSIS — G43.009 MIGRAINE WITHOUT AURA AND WITHOUT STATUS MIGRAINOSUS, NOT INTRACTABLE: ICD-10-CM

## 2022-11-10 DIAGNOSIS — E55.9 VITAMIN D DEFICIENCY: ICD-10-CM

## 2022-11-14 RX ORDER — MELOXICAM 15 MG/1
15 TABLET ORAL DAILY
Qty: 30 TABLET | Refills: 0 | Status: SHIPPED | OUTPATIENT
Start: 2022-11-14

## 2022-11-14 RX ORDER — SUMATRIPTAN 50 MG/1
TABLET, FILM COATED ORAL
Qty: 9 TABLET | Refills: 1 | Status: SHIPPED | OUTPATIENT
Start: 2022-11-14

## 2022-11-14 RX ORDER — ERGOCALCIFEROL 1.25 MG/1
CAPSULE ORAL
Qty: 24 CAPSULE | Refills: 0 | Status: SHIPPED | OUTPATIENT
Start: 2022-11-14

## 2022-11-15 RX ORDER — MELOXICAM 15 MG/1
TABLET ORAL
Qty: 30 TABLET | Refills: 0 | OUTPATIENT
Start: 2022-11-15

## 2022-11-16 ENCOUNTER — OFFICE VISIT (OUTPATIENT)
Dept: ORTHOPEDIC SURGERY | Age: 42
End: 2022-11-16

## 2022-11-16 VITALS — BODY MASS INDEX: 42.65 KG/M2 | WEIGHT: 256 LBS | HEIGHT: 65 IN

## 2022-11-16 DIAGNOSIS — Z98.890 S/P MEDIAL MENISCECTOMY OF RIGHT KNEE: Primary | ICD-10-CM
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
## 2022-11-29 RX ORDER — BUPIVACAINE HYDROCHLORIDE 2.5 MG/ML
2 INJECTION, SOLUTION INFILTRATION; PERINEURAL ONCE
Status: COMPLETED | OUTPATIENT
Start: 2022-11-29 | End: 2022-11-29

## 2022-11-29 RX ORDER — METHYLPREDNISOLONE ACETATE 80 MG/ML
80 INJECTION, SUSPENSION INTRA-ARTICULAR; INTRALESIONAL; INTRAMUSCULAR; SOFT TISSUE ONCE
Status: COMPLETED | OUTPATIENT
Start: 2022-11-29 | End: 2022-11-29

## 2022-11-29 RX ADMIN — BUPIVACAINE HYDROCHLORIDE 5 MG: 2.5 INJECTION, SOLUTION INFILTRATION; PERINEURAL at 14:13

## 2022-11-29 RX ADMIN — METHYLPREDNISOLONE ACETATE 80 MG: 80 INJECTION, SUSPENSION INTRA-ARTICULAR; INTRALESIONAL; INTRAMUSCULAR; SOFT TISSUE at 14:12

## 2022-11-29 RX ADMIN — METHYLPREDNISOLONE ACETATE 80 MG: 80 INJECTION, SUSPENSION INTRA-ARTICULAR; INTRALESIONAL; INTRAMUSCULAR; SOFT TISSUE at 14:11

## 2022-11-30 ENCOUNTER — HOSPITAL ENCOUNTER (OUTPATIENT)
Dept: PHYSICAL THERAPY | Age: 42
Setting detail: THERAPIES SERIES
Discharge: HOME | End: 2022-11-30
Payer: COMMERCIAL

## 2022-11-30 PROCEDURE — 97162 PT EVAL MOD COMPLEX 30 MIN: CPT

## 2022-11-30 PROCEDURE — 97110 THERAPEUTIC EXERCISES: CPT

## 2022-11-30 NOTE — PROGRESS NOTES
[facility]  [facility]. [address]         Phone: [phone]       Fax: [phone]     Physical Therapy Lower Extremity Evaluation     Date:  2021  Patient: Cabrera Boland             : 1980                  MRN: [id number]  Physician: Prasad Rodríguez De Sante: [facility] 30 visits/year  Medical Diagnosis: ADD (R) medial meniscus tear S83.241A ; s/p medial meniscectomy Z98.890            Rehab Codes: (R) knee pain M25.561  Onset date: surgery 10/19/21   ; referral 22             Next Dr's appt.: PRN  Visit Count:                            Cancel/No Show: 0/0     Subjective: Patient presents to therapy with complaints of medial and lateral joint line pain in the (R) knee as well as complaints of posterior knee grinding per the patient and swelling. Patient had medial menisectomy on 10/19/21 with partial improvement of symptoms, but stated that Arta Schilder has had a lot of swelling and pain even since then\". Patient saw ortho for scheduled follow up on 22- therapy was recommended at that time. Patient also had cortisone injection done at that time without improvement of symptoms per the patient- \"all it did was make me really sore for 4-5 days\"  Currently notes increased complaints of lateral knee pain with being on feet for whole day, with any attempts at kneeling and squatting- currently makes sure she uses a soft surface to kneel on. Has also been taking meloxicam for inflammation. Noted previous history of patella \"bone removal\" surgery due to osteo defect per the patient when she was 13. Also medical history of POTS.   CC: complaints of (R) anterior knee pain post surgically  HPI: surgery 10/19/21     PMHx: [] Unremarkable      [] Diabetes   [] HTN                     [] Pacemaker             [] MI/Heart Problems         [] Cancer      [] Arthritis     [x] Other:POTS              [x] Refer to full medical chart  In Epic        Comorbidities:   [x] Obesity [] Dialysis  [] Other:   [] Asthma/COPD [] Dementia [] Other:   [] Stroke [] Sleep apnea [] Other:   [] Vascular disease [] Rheumatic disease [] Other:   See Epic for comorbidities     Tests: [x] X-Ray:  se below     [] MRI:                     [] Other:    1/27/22  Findings: 4 views of the right knee in a skeletally mature patient showing    no acute osseous abnormalities. No evidence of acute fractures or    dislocations. Mild medial compartment joint space narrowing with    subchondral sclerosis.      Medications: [x] Refer to full medical record       [] None         [] Other:  Allergies:      [x] Refer to full medical record        [] None         [] Other:     Function:  Hand Dominance  [] Right  [] Left  Working:  [] Normal Duty  [] Light Duty  [] Off D/T Condition  [] Retired    [] Not Employed    []  Disability  [] Other:           Return to work:      Job/ADL Description: no presently working     Previously (I) with all functional activity, currently limited as listed below  ADL/IADL Previous level of function Current level of function Who currently assists the patient with task   Bathing  [] Independent  [] Assist [] Independent  [] Assist    Dress/grooming [] Independent  [] Assist [] Independent  [] Assist    Transfer/mobility [] Independent  [] Assist [] Independent  [] Assist    Feeding [] Independent  [] Assist [] Independent  [] Assist     Toileting [] Independent  [] Assist [] Independent  [] Assist     Driving [] Independent  [] Assist [] Independent  [] Assist    Housekeeping [] Independent  [] Assist [x] Independent  [] Assist Unable squatting/kneeling ADLs   Grocery shop/meal prep [] Independent  [] Assist [x] Independent  [] Assist Limited prolonged ambulation      Gait Prior level of function Current level of function     [] Independent  [] Assist [] Independent  [] Assist   Device: [] Independent [] Independent     [] Straight [employer] [] Quad cane [] Straight Cane [] Quad cane     [] Standard walker [] Rolling walker   [] 4 wheeled walker [] Standard walker [] Rolling walker   [] 4 wheeled walker     [] Wheelchair [] Wheelchair      Pain:  [x] Yes  [] No Location: (R) knee, particularly anterior knee, medial joint line, lateral joint line     Pain Rating: (0-10 scale) 6/10  Pain altered Tx:  [] Yes  [] No  Action:  Symptoms:  [] Improving   [] Worsening           [] Same  Better:  [] AM    [] PM    [] Sit    [] Rise/Sit    []Stand    [] Walk    [] Lying    [] Other:  Worse: [] AM    [] PM    [] Sit    [] Rise/Sit    []Stand    [] Walk    [] Lying    [] Bend                             [] Valsalva    [] Other:  Sleep: [] OK    [] Disturbed     Objective:  ROM:  0-115  (L) 5 degrees to 125 degrees     (I) SLR (R)    Hip flexion, abduction, ER, knee flexion, extension 4/5 (R) self limited pain, (L) 4+/5     Girth testing not completed today    Squats to 65 degrees. Stairs- pain with 8\" stairs.            OBSERVATION No Deficit Deficit Not Tested Comments   Posture           Forward Head []  []  []      Rounded Shoulders []  []  []      Kyphosis []  []  []      Lordosis []  []  []      Lateral Shift []  []  []      Scoliosis []  []  []      Iliac Crest []  []  []      PSIS []  []  []      ASIS []  []  []      Genu Valgus []  []  []      Genu Varus []  []  []      Genu Recurvatum []  []  []      Pronation []  []  []      Supination []  []  []      Leg Length Discrp []  []  []      Slumped Sitting []  []  []      Palpation []  []  []     Sensation []  []  []      Edema []  []  []      Neurological []  []  []      Patellar Mobility []  []  []      Patellar Orientation []  []  []      Gait []  []  []  Analysis:             FUNCTIONAL TESTS PAIN NO PAIN COMMENTS   Step Test 4 []  []      6 [x]  []      8 [x]  []      Squat []  []            Comments:     Assessment:  Patient with limited ROM, strength, functional tolerance of stairs; kneeling/squatting, s/p surgical intervention. 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 [x] Patient would continue to benefit from skilled physical therapy services in order to: improve mobility, improve strength, improve functional tolerance of ambulation, prolonged standing, and stairs. Problems:    [x] ? Pain:                    [x] ? ROM:                  [x] ? Strength:                        [x] ? Function:                                     STG: (to be met in 8 treatments)  ? Pain:0-1/10 in (R) knee to help with general function  ? ROM: 0-120 degrees to allow better ROM for stairs, squatting/ADLs  ? Strength: 4+/5 strength testing  ? Function: Able to squat to 80 degrees to allow better tolerance of housework/cleaning; able to ascend and descend 8\" stairs with minimal to no difficulty, able to WB/ambulate up to 30 minutes with minimal to no soreness. Independent with Home Exercise Programs  LTG: (to be met in 12 treatments)   ? Pain:0-1/10 in (R) knee to help with general function  ? ROM: 0-125 degrees to allow better ROM for stairs, squatting/ADLs  ? Strength: 4+/5 strength testing  ? Function: Able to squat to 90 degrees to allow better tolerance of housework/cleaning; able to ascend and descend 8\" stairs with minimal to no difficulty, able to WB/ambulate up to one hour with minimal to no soreness.   Independent with Home Exercise Programs    Patient goals: no pain        Functional Assessment Used: optimal 8/3 for ambulation, stairs, ambulation long distances 41% LIMITED  Current Status: Score  Goal Status: Score     Evaluation Complexity:  History (Personal factors, comorbidities) [] 0 [x] 1-2 [] 3+   Exam (limitations, restrictions) [] 1-2 [x] 3 [] 4+   Clinical presentation (progression) [] Stable [x] Evolving  [] Unstable   Decision Making [] Low [x] Moderate [] High     [] Low Complexity [x] Moderate Complexity [] High Complexity         Rehab Potential:  [] Good  [x] Fair  [] Poor          Suggested Professional Referral:  [x] No  [] Yes:  Barriers to Goal Achievement[de-identified]  [x] No  [] Yes:  Domestic Concerns:  [x] No  [] Yes:     Pt. Education:  [x] Plans/Goals, Risks/Benefits discussed  [x] Home exercise program    Method of Education: [x] Verbal  [] Demo  [x] Written  Comprehension of Education:  [x] Verbalizes understanding. [] Demonstrates understanding. [] Needs Review. [] Demonstrates/verbalizes understanding of HEP/Ed previously given. Treatment Plan:  [x] Therapeutic Exercise   48122            [] Iontophoresis: 4 mg/mL Dexamethasone Sodium Phosphate  mAmin  95867   [] Therapeutic Activity  49845 [x] Vasopneumatic cold with compression  50820              [] Gait Training             32480 [] Ultrasound      54774   [x] Neuromuscular Re-education  39511 [] Electrical Stimulation Unattended  58211   [x] Manual Therapy  48181 [] Electrical Stimulation Attended  59488   [x] Instruction in HEP    [] Lumbar/Cervical Traction  09705   [] Aquatic Therapy        17160 [] Cold/hotpack              [] Massage   60719      [] Dry Needling, 1 or 2 muscles  63472   [] Biofeedback, first 15 minutes   90365  [] Biofeedback, additional 15 minutes   87197 [] Dry Needling, 3 or more muscles  33958         []  Medication allergies reviewed for use of              Dexamethasone Sodium Phosphate 4mg/ml               with iontophoresis treatments. Pt is not allergic.      Frequency:  2-3 x/week for 12 visits     Todays Treatment:  Modalities:   Precautions:  Exercises:  Exercise Reps/ Time Weight/ Level Comments   Heel slide 15       Supine IT band stretch 3 x 20\"       Supine calf stretch 3 x 20\"       Supine hamstring stretch 3 x 20\"       other:     Specific Instructions for next treatment:     Treatment Charges: Mins Units   [x] Evaluation       []  Low       [x]  Moderate       [] High 20' 1   []  Modalities       []  Ther Exercise 25 2   []  Manual Therapy       []  Ther Activities       []  Aquatics       []  Neuromuscular       []  Gait Training       []  Dry Needling           1-2 muscles       []  Dry Needling           3 or more          muscles       [] Vasocompression     []  Other 45 3         TOTAL TREATMENT TIME: 48     Time in:1015   Time Out:1110     Electronically signed by: Jaylan Keith PT           Physician Signature:________________________________Date:__________________  By signing above or cosigning this note, I have reviewed this plan of care and certify a need for medically necessary rehabilitation services.       *PLEASE SIGN ABOVE AND FAX BACK ALL PAGES*

## 2022-12-09 ENCOUNTER — HOSPITAL ENCOUNTER (OUTPATIENT)
Dept: PHYSICAL THERAPY | Age: 42
Setting detail: THERAPIES SERIES
Discharge: HOME | End: 2022-12-09

## 2022-12-09 RX ORDER — MELOXICAM 15 MG/1
TABLET ORAL
Qty: 30 TABLET | Refills: 0 | Status: SHIPPED | OUTPATIENT
Start: 2022-12-09

## 2022-12-09 NOTE — FLOWSHEET NOTE
[address] [facility]   [address]   Phone: [phone]   Fax: [phone]    Physical Therapy Daily Treatment Note    Date:  2021  Patient Name:  Santi Nguyen    :  1980  MRN: [id number]  Physician: Mela Pan MD                          Insurance: [employer] 30 visits/year  Medical Diagnosis: (R) medial meniscus tear S83.241A               Rehab Codes: (R) knee pain M25.561  Onset date: surgery 10/19/21                Next Dr's appt.: N/A  Visit Count: 13                          Cancel/No Show: 0/0    Subjective:  Pt still noting complaints of joint line pain and complaints of instability of the knee. Pain intermittently with gait in the joint line, but more worried about intermittent instability during gait and with stairs.     Pain:  [] Yes  [x] No Location: Right knee   Pain Rating: (0-10 scale) 0/10  Pain altered Tx:  [x] No  [] Yes  Action:    Comments:      Objective:  Modalities: VASO- 15' knee sleeve, low compression, 40 degrees (Not Today)  Precautions: History of Seizures; Grand mal   Exercises:   Exercise Reps/ Time Weight/ Level Comments Completed today   Nustep  5' L3  x          Heel slide 15        Flexion over edge of table 15 x 5\"        Supine calf stretch 3 x 30\"        Supine hamstring stretch 3 x 15\"        Sidelying hip AB x15 reps      LAQ  2x10 reps      SLR 2x10 reps R only     Clamshells 2x10  R only            Standing        gastroc stretch on SB  3x30\"   x   HS stretch on step  3x30\"ea   x   Heel raises  20x    x   3 way hip  10xea Red  bilat  x   Step ups  x15 8\"  x   Side steps  x15 8\"  x   Frw Lunges  20x    x   Side lunges  20x   x   Squats  2x10    60 degree flexion x   TKE 20x Red  x   Total Gym squats  20x   90 degrees flexion  x   Heel tap 10x 2\" Painful    Squat w/ weighted ball x20 reps #4  x          Other:     Specific Instructions for next treatment: Continue to focus on knee ROM and progress strengthening per Protocol. Assessment: [x] Progressing toward goals. Minimal ROM deficits, continued complaints of pain and instability. Difficulty with stairs, difficulty with squatting/kneeling ADLs. [] No change. [] Other:      [x] Patient would continue to benefit from skilled physical therapy services in order to: improve mobility, improve strength, improve functional tolerance of ambulation, prolonged standing, and stairs    STG: (to be met in 6 treatments)  ? Pain:0-1  ? ROM: 0-90 heel slide, 105 degrees over edge of table  ? Strength: able to tolerate strengthening over edge of table 4/5 for knee flexion, extension, hip testing  ? Function: Able to ambulate without (A) device with minimal to no antalgic gait, able to ascend/descend 6\" stairs  Independent with Home Exercise Programs  LTG: (to be met in 12 treatments)  ? Pain:0/10  ? ROM: 0-110 heel slide  ? Strength: able to tolerate strengthening over edge of table 4+/5 for knee flexion, extension, hip testing, (B) squats past 70 degrees  ? Function: Able to ambulate without (A) device with minimal to no antalgic gait, able to ascend/descend 8\" stairs  Independent with Home Exercise Program            Patient goals: no pain    Pt. Education:  [x] Yes  [] No  [x] Reviewed Prior HEP/Ed  Method of Education: [x] Verbal  [x] Demo  [] Written  Comprehension of Education:  [x] Verbalizes understanding. [x] Demonstrates understanding. [] Needs review. [] Demonstrates/verbalizes HEP/Ed previously given. Plan: [x] Continue per plan of care.    [] Other:      Treatment Charges: Mins Units   []  Modalities     [x]  Ther Exercise 35 2   []  Manual Therapy     []  Ther Activities     []  Aquatics     []  Neuromuscular 10 1   [] Vasocompression     [] Gait Training     [] Dry needling        [] 1 or 2 muscles        [] 3 or more muscles     []  Other     Total Treatment time 45 3     Time In:  1400          Time Out: 4604    Electronically signed by:  Charity Rhodes, PT

## 2023-01-10 DIAGNOSIS — G43.009 MIGRAINE WITHOUT AURA AND WITHOUT STATUS MIGRAINOSUS, NOT INTRACTABLE: ICD-10-CM

## 2023-01-16 RX ORDER — MELOXICAM 15 MG/1
TABLET ORAL
Qty: 30 TABLET | Refills: 0 | Status: SHIPPED | OUTPATIENT
Start: 2023-01-16

## 2023-01-16 RX ORDER — SUMATRIPTAN 50 MG/1
TABLET, FILM COATED ORAL
Qty: 9 TABLET | Refills: 0 | Status: SHIPPED | OUTPATIENT
Start: 2023-01-16

## 2023-01-16 NOTE — TELEPHONE ENCOUNTER
The patient is getting new insurance and is waiting to find out what new PCP is covered. She will need courtesy refills of Imitrex and Mobic in the meantime.

## 2023-01-23 ENCOUNTER — HOSPITAL ENCOUNTER (EMERGENCY)
Age: 43
Discharge: HOME | End: 2023-01-23
Attending: EMERGENCY MEDICINE
Payer: COMMERCIAL

## 2023-01-23 ENCOUNTER — APPOINTMENT (OUTPATIENT)
Dept: GENERAL RADIOLOGY | Age: 43
End: 2023-01-23
Payer: COMMERCIAL

## 2023-01-23 VITALS
DIASTOLIC BLOOD PRESSURE: 81 MMHG | RESPIRATION RATE: 16 BRPM | TEMPERATURE: 97.8 F | HEIGHT: 65 IN | BODY MASS INDEX: 41.65 KG/M2 | HEART RATE: 73 BPM | SYSTOLIC BLOOD PRESSURE: 145 MMHG | OXYGEN SATURATION: 99 % | WEIGHT: 250 LBS

## 2023-01-23 DIAGNOSIS — J40 BRONCHITIS: Primary | ICD-10-CM

## 2023-01-23 LAB
ANION GAP SERPL CALCULATED.3IONS-SCNC: 12 MMOL/L (ref 9–17)
BUN BLDV-MCNC: 13 MG/DL (ref 6–20)
CALCIUM SERPL-MCNC: 8.3 MG/DL (ref 8.6–10.4)
CHLORIDE BLD-SCNC: 108 MMOL/L (ref 98–107)
CO2: 18 MMOL/L (ref 20–31)
CREAT SERPL-MCNC: 1.11 MG/DL (ref 0.5–0.9)
GFR SERPL CREATININE-BSD FRML MDRD: >60 ML/MIN/1.73M2
GLUCOSE BLD-MCNC: 94 MG/DL (ref 70–99)
INFLUENZA A: NOT DETECTED
INFLUENZA B: NOT DETECTED
POTASSIUM SERPL-SCNC: 4 MMOL/L (ref 3.7–5.3)
PRO-BNP: 179 PG/ML
SARS-COV-2 RNA, RT PCR: NOT DETECTED
SODIUM BLD-SCNC: 138 MMOL/L (ref 135–144)
SOURCE: NORMAL
SPECIMEN DESCRIPTION: NORMAL
TROPONIN, HIGH SENSITIVITY: <6 NG/L (ref 0–14)

## 2023-01-23 PROCEDURE — 99285 EMERGENCY DEPT VISIT HI MDM: CPT

## 2023-01-23 PROCEDURE — 83880 ASSAY OF NATRIURETIC PEPTIDE: CPT

## 2023-01-23 PROCEDURE — 71045 X-RAY EXAM CHEST 1 VIEW: CPT

## 2023-01-23 PROCEDURE — 87636 SARSCOV2 & INF A&B AMP PRB: CPT

## 2023-01-23 PROCEDURE — 93005 ELECTROCARDIOGRAM TRACING: CPT | Performed by: STUDENT IN AN ORGANIZED HEALTH CARE EDUCATION/TRAINING PROGRAM

## 2023-01-23 PROCEDURE — 84484 ASSAY OF TROPONIN QUANT: CPT

## 2023-01-23 PROCEDURE — 36415 COLL VENOUS BLD VENIPUNCTURE: CPT

## 2023-01-23 PROCEDURE — 80048 BASIC METABOLIC PNL TOTAL CA: CPT

## 2023-01-23 RX ORDER — BENZONATATE 100 MG/1
100 CAPSULE ORAL 3 TIMES DAILY PRN
Qty: 21 CAPSULE | Refills: 0 | Status: SHIPPED | OUTPATIENT
Start: 2023-01-23 | End: 2023-01-30

## 2023-01-23 RX ORDER — ACETAMINOPHEN 500 MG
1000 TABLET ORAL EVERY 6 HOURS PRN
Qty: 56 TABLET | Refills: 0 | Status: SHIPPED | OUTPATIENT
Start: 2023-01-23 | End: 2023-01-30

## 2023-01-23 ASSESSMENT — LIFESTYLE VARIABLES
HOW OFTEN DO YOU HAVE A DRINK CONTAINING ALCOHOL: NEVER
HOW MANY STANDARD DRINKS CONTAINING ALCOHOL DO YOU HAVE ON A TYPICAL DAY: PATIENT DOES NOT DRINK

## 2023-01-23 ASSESSMENT — PAIN - FUNCTIONAL ASSESSMENT: PAIN_FUNCTIONAL_ASSESSMENT: 0-10

## 2023-01-23 ASSESSMENT — PAIN SCALES - GENERAL: PAINLEVEL_OUTOF10: 9

## 2023-01-23 ASSESSMENT — HEART SCORE: ECG: 0

## 2023-01-23 NOTE — ED PROVIDER NOTES
[address]  Emergency Department Encounter  EmergencyMedicine Resident     Pt Name:Rachel Bradford Scheuermann  MRN: [id number]  [place] 1980  Date of evaluation: 1/23/23  PCP:  Brock Diamond MD    This patient was evaluated in the Emergency Department for symptoms described in the history of present illness. CHIEF COMPLAINT       Chief Complaint   Patient presents with    Chest Pain    Shortness of Breath     Pt started having cold and flu symptoms since Saturday, now having significant SOB and CP       HISTORY OF PRESENT ILLNESS  (Location/Symptom, Timing/Onset, Context/Setting, Quality, Duration, Modifying Factors, Severity.)      Grayson Han is a 43 y.o. female who presents with chest heaviness, cough, fatigue, shortness of breath. Patient states that she has been experiencing the symptoms since Saturday. States that they are getting worse. States that she has having a chest heaviness. States that she has a history of multiple episodes of pneumonia in the past.  States that she feels like she is experiencing pneumonia again. Describes chest pain as a chest heaviness. Diffuse. Nonradiating. No nausea/vomiting. No back pain. No diaphoresis. Slight shortness of breath. Also endorsing fatigue and myalgias. Cough is nonproductive in nature. States that she did have 1 episode of hemoptysis. No leg swelling. Not on blood thinners. No history of blood clots. PAST MEDICAL / SURGICAL / SOCIAL / FAMILY HISTORY      has a past medical history of Acute medial meniscus tear of right knee, Allergies, Arthritis, Kidney stone, Kidney stones, Liver mass, Migraine, POTS (postural orthostatic tachycardia syndrome), Seizures ([address] and Wellness examination. has a past surgical history that includes Cholecystectomy; Appendectomy; Endometrial ablation; Cystocopy (06/068/2016); Hysterectomy; Knee arthroscopy (Right, 10/19/2021);  Knee arthroscopy (Right, 10/19/2021); and liver biopsy. Social History     Socioeconomic History    Marital status:      Spouse name: Not on file    Number of children: Not on file    Years of education: Not on file    Highest education level: Not on file   Occupational History    Not on file   Tobacco Use    Smoking status: Never    Smokeless tobacco: Never   Vaping Use    Vaping Use: Never used   Substance and Sexual Activity    Alcohol use: No    Drug use: No    Sexual activity: Not on file   Other Topics Concern    Not on file   Social History Narrative    Not on file     Social Determinants of Health     Financial Resource Strain: Low Risk     Difficulty of Paying Living Expenses: Not hard at all   Food Insecurity: No Food Insecurity    Worried About Running Out of Food in the Last Year: Never true    Ran Out of Food in the Last Year: Never true   Transportation Needs: Not on file   Physical Activity: Not on file   Stress: Not on file   Social Connections: Not on file   Intimate Partner Violence: Not on file   Housing Stability: Not on file       Family History   Problem Relation Age of Onset    Thyroid Disease Mother     Uterine Cancer Mother        Allergies: Other    Home Medications:  Prior to Admission medications    Medication Sig Start Date End Date Taking? Authorizing Provider   benzonatate (TESSALON) 100 MG capsule Take 1 capsule by mouth 3 times daily as needed for Cough 1/23/23 1/30/23 Yes Mane Ranellone, DO   acetaminophen (TYLENOL) 500 MG tablet Take 2 tablets by mouth every 6 hours as needed for Pain 1/23/23 1/30/23 Yes Mane Rickellone, DO   SUMAtriptan (IMITREX) 50 MG tablet TAKE ONE TABLET BY MOUTH AT ONSET OF HEADACHE. MAY REPEAT ONCE IN 2 HOURS (ALONG WITH NAPROXEN).  MAX 2 DOSES IN 24 HOURS. 1/16/23   David Mclean MD   meloxicam (MOBIC) 15 MG tablet TAKE 1 TABLET BY MOUTH ONCE DAILY (AVOID  TAKING  WITH  TREXIMET) 1/16/23   Dvaid Mclean MD   vitamin D (ERGOCALCIFEROL) 1.25 MG (00694 UT) CAPS capsule Take one cap po twice weekly for 3 months, then go back to once weekly 11/14/22   Mary Silva MD   cyanocobalamin ([employer] VITAMIN B12) 1000 MCG tablet Take 1 tablet by mouth daily 11/14/22   Mary Silva MD   naproxen (NAPROSYN) 500 MG tablet Take 1 tablet by mouth in the morning and 1 tablet in the evening. Take with meals. 7/26/22   ANA Fu - CNP   cetirizine (ZYRTEC) 10 MG tablet Take 1 tablet by mouth in the morning and at bedtime 7/9/22   Historical Provider, MD   Azelastine HCl 137 MCG/SPRAY SOLN 1-2 sprays 2 times daily 6/7/22   Historical Provider, MD   midodrine (PROAMATINE) 5 MG tablet 5 mg  in the morning and 5 mg at noon and 5 mg before bedtime. Pt takes at 0600, 1130, 1630. 7/6/22   Historical Provider, MD   topiramate (TOPAMAX) 100 MG tablet Take 1 1/2 po bid 6/14/22   Caitlyn Rodrigues MD   BIOTIN 5000 PO Take by mouth daily    Historical Provider, MD   Fluticasone Propionate (FLONASE ALLERGY RELIEF NA) 1 spray by Nasal route daily    Historical Provider, MD       REVIEW OF SYSTEMS    (2-9 systems for level 4, 10 or more for level 5)    Review of Systems   Constitutional:  Positive for fatigue. Negative for chills and fever. HENT:  Negative for congestion. Eyes:  Negative for visual disturbance. Respiratory:  Positive for cough, chest tightness and shortness of breath. Cardiovascular:  Positive for chest pain. Negative for palpitations and leg swelling. Gastrointestinal:  Negative for abdominal pain, constipation, diarrhea, nausea and vomiting. Genitourinary:  Negative for difficulty urinating, dysuria, vaginal bleeding and vaginal discharge. Musculoskeletal:  Negative for back pain. Skin:  Negative for wound. Neurological:  Negative for weakness, numbness and headaches.        PHYSICAL EXAM   (up to 7 for level 4, 8 or more for level 5)    INITIAL VITALS:   BP (!) 145/81   Pulse 73   Temp 97.8 °F (36.6 °C)   Resp 16   Ht 5' 5\" (1.651 m)   Wt 250 lb (113.4 kg)   SpO2 99% BMI 41.60 kg/m²   I have reviewed the triage vital signs. Const: Well nourished, well developed, appears stated age, no acute distress, nontoxic  Eyes: PERRL, no conjunctival injection  HENT: NCAT, Neck supple without meningismus   CV: RRR, Warm, well-perfused extremities. +2/4 radial pulses bilaterally. No leg swelling. RESP: CTAB, Unlabored respiratory effort  GI: soft, non-tender, non-distended, no masses  MSK: No gross deformities appreciated  Skin: Warm, dry. No rashes  Neuro: Alert, CNs II-XII grossly intact. Sensation and motor function of extremities grossly intact. Psych: Appropriate mood and affect. DIFFERENTIAL  DIAGNOSIS   DDX:  ACS/MI, pneumonia, pneumothorax, viral illness CHF, COPD, MSK, anxiety, GERD    Initial MDM:  42-year-old female presents with chest heaviness, cough, fatigue. States the symptoms have been ongoing for several days. States they are progressive. Endorsing 1 episode of hemoptysis. No leg swelling, no history of blood clots, not on blood thinners. Well score of 1, low risk for PE. Will get cardiac work-up. Will test for COVID and flu. Will treat symptoms. Will reevaluate.     PLAN (LABS / IMAGING / EKG):  Orders Placed This Encounter   Procedures    COVID-19 & Influenza Combo    XR CHEST PORTABLE    Basic Metabolic Panel    Brain Natriuretic Peptide    EKG 12 Lead       MEDICATIONS ORDERED:  Orders Placed This Encounter   Medications    benzonatate (TESSALON) 100 MG capsule     Sig: Take 1 capsule by mouth 3 times daily as needed for Cough     Dispense:  21 capsule     Refill:  0    acetaminophen (TYLENOL) 500 MG tablet     Sig: Take 2 tablets by mouth every 6 hours as needed for Pain     Dispense:  56 tablet     Refill:  0         DIAGNOSTIC RESULTS / EMERGENCY DEPARTMENT COURSE / MDM   LAB RESULTS:  Results for orders placed or performed during the hospital encounter of 01/23/23   COVID-19 & Influenza Combo    Specimen: Nasopharyngeal Swab   Result Value Ref Range    Specimen Description . NASOPHARYNGEAL SWAB     Source . NASOPHARYNGEAL SWAB     SARS-CoV-2 RNA, RT PCR Not Detected Not Detected    INFLUENZA A Not Detected Not Detected    INFLUENZA B Not Detected Not Detected   Basic Metabolic Panel   Result Value Ref Range    Glucose 94 70 - 99 mg/dL    BUN 13 6 - 20 mg/dL    Creatinine 1.11 (H) 0.50 - 0.90 mg/dL    Est, Glom Filt Rate >60 >60 mL/min/1.73m2    Calcium 8.3 (L) 8.6 - 10.4 mg/dL    Sodium 138 135 - 144 mmol/L    Potassium 4.0 3.7 - 5.3 mmol/L    Chloride 108 (H) 98 - 107 mmol/L    CO2 18 (L) 20 - 31 mmol/L    Anion Gap 12 9 - 17 mmol/L   Troponin   Result Value Ref Range    Troponin, High Sensitivity <6 0 - 14 ng/L   Brain Natriuretic Peptide   Result Value Ref Range    Pro- <300 pg/mL   EKG 12 Lead   Result Value Ref Range    Ventricular Rate 59 BPM    Atrial Rate 59 BPM    P-R Interval 186 ms    QRS Duration 80 ms    Q-T Interval 436 ms    QTc Calculation (Bazett) 431 ms    P Axis 54 degrees    R Axis 13 degrees    T Axis 31 degrees       Slightly elevated creatinine, not much higher than baseline, patient tolerating p.o., will likely come down with fluids. RADIOLOGY:  XR CHEST PORTABLE    Result Date: 1/23/2023  EXAMINATION: ONE XRAY VIEW OF THE CHEST 1/23/2023 2:54 pm COMPARISON: 07/21/2022 HISTORY: ORDERING SYSTEM PROVIDED HISTORY: chest heaviness, cough TECHNOLOGIST PROVIDED HISTORY: chest heaviness, cough Reason for Exam: Chest heaviness and cough since Saturday. FINDINGS: There is suboptimal inspiration. The cardiac size is normal. No acute infiltrates or pleural effusions are seen. Pulmonary vascularity appears normal. .  . No acute bony abnormalities.  The hilar structures are normal.     No acute cardiopulmonary disease      EKG  Rhythm: sinus bradycardia and sinus arrhythmia  Rate: 50-60  Axis: normal  Ectopy: none  Conduction: normal  ST Segments: no acute change  T Waves: no acute change  Q Waves: none    EKG  Impression: non-specific EKG     All EKG's are interpreted by the Emergency Department Physician who either signs or Co-signs this chart in the absence of a cardiologist.    MDM/EMERGENCY DEPARTMENT COURSE:  Upon reevaluation patient resting comfortably, no acute distress.  Encouraged patient to follow-up with primary care physician.  Given return precautions.  Given prescription for Tylenol and Tessalon Perles.  Patient discharged home.  Patient understands and agrees with the plan.    CRITICAL CARE:  Please see Attending Note    FINAL IMPRESSION      1. Bronchitis          DISPOSITION / PLAN     DISPOSITION Decision To Discharge 01/23/2023 07:02:51 PM    PATIENT REFERRED TO:  Maurice Dickens MD  [address] [facility] [address]  [place] [address]  [phone]    Schedule an appointment as soon as possible for a visit   As needed    [facility]  [address] [place] [address]  [phone]  Go to   If symptoms worsen    DISCHARGE MEDICATIONS:  Discharge Medication List as of 1/23/2023  7:05 PM        START taking these medications    Details   benzonatate (TESSALON) 100 MG capsule Take 1 capsule by mouth 3 times daily as needed for Cough, Disp-21 capsule, R-0Print      acetaminophen (TYLENOL) 500 MG tablet Take 2 tablets by mouth every 6 hours as needed for Pain, Disp-56 tablet, R-0Print             Mane Almaguer DO  Emergency Medicine Resident, PGY 2    (Please note that portions of this note were completed with a voice recognition program.  Efforts were made to edit the dictations but occasionally words are mis-transcribed.)       Mane Almaguer DO  Resident  01/24/23 0108

## 2023-01-24 LAB
EKG ATRIAL RATE: 59 BPM
EKG P AXIS: 54 DEGREES
EKG P-R INTERVAL: 186 MS
EKG Q-T INTERVAL: 436 MS
EKG QRS DURATION: 80 MS
EKG QTC CALCULATION (BAZETT): 431 MS
EKG R AXIS: 13 DEGREES
EKG T AXIS: 31 DEGREES
EKG VENTRICULAR RATE: 59 BPM

## 2023-01-24 PROCEDURE — 93010 ELECTROCARDIOGRAM REPORT: CPT | Performed by: INTERNAL MEDICINE

## 2023-01-24 ASSESSMENT — ENCOUNTER SYMPTOMS
CHEST TIGHTNESS: 1
DIARRHEA: 0
CONSTIPATION: 0
BACK PAIN: 0
NAUSEA: 0
ABDOMINAL PAIN: 0
VOMITING: 0
COUGH: 1
SHORTNESS OF BREATH: 1

## 2023-01-24 NOTE — ED PROVIDER NOTES
[address]     Pt Name: Melinda Randle  MRN: [id number]  [place] 1980  Date of evaluation: 1/23/23       Melinda Randle is a 43 y.o. female who presents with Chest Pain and Shortness of Breath (Pt started having cold and flu symptoms since Saturday, now having significant SOB and CP)      MDM:     Cough, cold, congestion symptoms over the past 3 days having some left-sided chest pain when she coughs. No other chest pain otherwise. History of recurrent pneumonias in the past, I think she might need an antibiotic. Suspect PE or acute MI or ACS or aortic dissection or pneumonia or sepsis. Chest x-ray negative suspect bronchitis. She is not a smoker. Do not think she needs antibiotics at this time. Suspect URI and viral bronchitis and chest wall pain and pleurisy. Discussed with patient anticipatory guidance, discharge instructions, follow up PCP 24 hours  HEART score 0    Vitals:   Vitals:    01/23/23 1732   BP: (!) 145/81   Pulse: 73   Resp: 16   Temp: 97.8 °F (36.6 °C)   SpO2: 99%   Weight: 250 lb (113.4 kg)   Height: 5' 5\" (1.651 m)         I personally saw and examined the patient. I have reviewed and agree with the resident's findings, including all diagnostic interpretations and treatment plan as written. I was present for the key portions of any procedures performed and the inclusive time noted for any critical care statement. The care is provided during an unprecedented national emergency due to the novel coronavirus, COVID 19.   Tomasa Hunt MD  Attending Emergency Physician            Tomasa Hunt MD  01/23/23 6523

## 2023-01-24 NOTE — DISCHARGE INSTRUCTIONS
You were evaluated in the emergency department for cough, chest heaviness, shortness of breath. Your flu and COVID tests were negative. Your chest x-ray did not reveal any pneumonia. Your cardiac enzymes were within normal limits. Your EKG did not show any acute abnormalities. Please follow-up with your primary care physician this week. Please return to the emergency department for any worsening symptoms including but not limited to worsening coughing, worsening shortness of breath, worsening chest pain, nausea/vomiting, change in vision or hearing, dizziness, inability to stand or walk, vomiting up blood, numbness/tingling, back pain, or any other questions or concerns.

## 2023-02-03 DIAGNOSIS — E55.9 VITAMIN D DEFICIENCY: ICD-10-CM

## 2023-02-03 RX ORDER — ERGOCALCIFEROL 1.25 MG/1
CAPSULE ORAL
Qty: 4 CAPSULE | Refills: 0 | Status: SHIPPED | OUTPATIENT
Start: 2023-02-03

## 2023-02-18 DIAGNOSIS — G43.009 MIGRAINE WITHOUT AURA AND WITHOUT STATUS MIGRAINOSUS, NOT INTRACTABLE: ICD-10-CM

## 2023-02-18 DIAGNOSIS — E55.9 VITAMIN D DEFICIENCY: ICD-10-CM

## 2023-02-20 ENCOUNTER — HOSPITAL ENCOUNTER (OUTPATIENT)
Dept: ULTRASOUND IMAGING | Age: 43
Discharge: HOME | End: 2023-02-22
Payer: COMMERCIAL

## 2023-02-20 ENCOUNTER — HOSPITAL ENCOUNTER (EMERGENCY)
Age: 43
Discharge: HOME | End: 2023-02-20
Attending: STUDENT IN AN ORGANIZED HEALTH CARE EDUCATION/TRAINING PROGRAM
Payer: COMMERCIAL

## 2023-02-20 ENCOUNTER — APPOINTMENT (OUTPATIENT)
Dept: GENERAL RADIOLOGY | Age: 43
End: 2023-02-20
Payer: COMMERCIAL

## 2023-02-20 VITALS
BODY MASS INDEX: 41.6 KG/M2 | WEIGHT: 250 LBS | DIASTOLIC BLOOD PRESSURE: 93 MMHG | OXYGEN SATURATION: 94 % | HEART RATE: 98 BPM | TEMPERATURE: 98.6 F | RESPIRATION RATE: 17 BRPM | SYSTOLIC BLOOD PRESSURE: 133 MMHG

## 2023-02-20 DIAGNOSIS — R05.9 COUGH, UNSPECIFIED TYPE: Primary | ICD-10-CM

## 2023-02-20 DIAGNOSIS — N20.0 RENAL STONES: ICD-10-CM

## 2023-02-20 DIAGNOSIS — B34.9 VIRAL ILLNESS: ICD-10-CM

## 2023-02-20 LAB
FLUAV RNA RESP QL NAA+PROBE: NOT DETECTED
FLUBV RNA RESP QL NAA+PROBE: NOT DETECTED
SARS-COV-2 RNA RESP QL NAA+PROBE: NOT DETECTED
SOURCE: NORMAL
SPECIMEN DESCRIPTION: NORMAL

## 2023-02-20 PROCEDURE — 71046 X-RAY EXAM CHEST 2 VIEWS: CPT

## 2023-02-20 PROCEDURE — 87636 SARSCOV2 & INF A&B AMP PRB: CPT

## 2023-02-20 PROCEDURE — 6370000000 HC RX 637 (ALT 250 FOR IP): Performed by: STUDENT IN AN ORGANIZED HEALTH CARE EDUCATION/TRAINING PROGRAM

## 2023-02-20 PROCEDURE — 76775 US EXAM ABDO BACK WALL LIM: CPT

## 2023-02-20 PROCEDURE — 99284 EMERGENCY DEPT VISIT MOD MDM: CPT

## 2023-02-20 RX ORDER — ACETAMINOPHEN 500 MG
1000 TABLET ORAL ONCE
Status: COMPLETED | OUTPATIENT
Start: 2023-02-20 | End: 2023-02-20

## 2023-02-20 RX ORDER — ERGOCALCIFEROL 1.25 MG/1
CAPSULE ORAL
Qty: 12 CAPSULE | Refills: 0 | Status: SHIPPED | OUTPATIENT
Start: 2023-02-20

## 2023-02-20 RX ORDER — GUAIFENESIN 600 MG/1
600 TABLET, EXTENDED RELEASE ORAL 2 TIMES DAILY
Qty: 30 TABLET | Refills: 0 | Status: SHIPPED | OUTPATIENT
Start: 2023-02-20 | End: 2023-03-07

## 2023-02-20 RX ORDER — SUMATRIPTAN 50 MG/1
TABLET, FILM COATED ORAL
Qty: 9 TABLET | Refills: 0 | Status: SHIPPED | OUTPATIENT
Start: 2023-02-20 | End: 2023-03-20

## 2023-02-20 RX ORDER — BENZONATATE 100 MG/1
100 CAPSULE ORAL ONCE
Status: COMPLETED | OUTPATIENT
Start: 2023-02-20 | End: 2023-02-20

## 2023-02-20 RX ORDER — MELOXICAM 15 MG/1
TABLET ORAL
Qty: 30 TABLET | Refills: 0 | Status: SHIPPED | OUTPATIENT
Start: 2023-02-20 | End: 2023-03-20

## 2023-02-20 RX ORDER — BENZONATATE 100 MG/1
100 CAPSULE ORAL 3 TIMES DAILY PRN
Qty: 30 CAPSULE | Refills: 0 | Status: SHIPPED | OUTPATIENT
Start: 2023-02-20 | End: 2023-02-27

## 2023-02-20 RX ADMIN — GUAIFENESIN AND DEXTROMETHORPHAN HYDROBROMIDE 1 TABLET: 600; 30 TABLET, EXTENDED RELEASE ORAL at 22:20

## 2023-02-20 RX ADMIN — BENZONATATE 100 MG: 100 CAPSULE ORAL at 22:14

## 2023-02-20 RX ADMIN — ACETAMINOPHEN 1000 MG: 500 TABLET ORAL at 22:13

## 2023-02-20 NOTE — Clinical Note
Biju Bass was seen and treated in our emergency department on 2/20/2023. She may return to work on 02/23/2023. If you have any questions or concerns, please don't hesitate to call.       Heide Moreno, DO

## 2023-02-21 ASSESSMENT — ENCOUNTER SYMPTOMS
COUGH: 1
VOMITING: 1
ABDOMINAL PAIN: 0
BACK PAIN: 0

## 2023-02-21 NOTE — ED PROVIDER NOTES
[address]     Pt Name: Kyung Johnson  MRN: [id number]  [place] 1980  Date of evaluation: 2/20/23       Kyung Johnson is a 43 y.o. female who presents with Cough, Shortness of Breath, and Emesis    Congestion and cough    Normal vital signs well appearing    Covid flu negative    MDM:     Likely viral uri with cough, discharge with symptomatic therapy and close pcp follow up    Vitals:   Vitals:    02/20/23 2157 02/20/23 2159 02/20/23 2214 02/20/23 2218   BP: (!) 147/99      Pulse: 99   99   Resp: 18      Temp:  98.7 °F (37.1 °C)     TempSrc:  Oral     SpO2: 94%      Weight:   250 lb (113.4 kg)          I personally saw and examined the patient. I have reviewed and agree with the resident's findings, including all diagnostic interpretations and treatment plan as written. I was present for the key portions of any procedures performed and the inclusive time noted for any critical care statement. The care is provided during an unprecedented national emergency due to the novel coronavirus, COVID 19.   Billy Mohamud MD  Attending Emergency Physician           Billy Mohamud MD  02/20/23 7695

## 2023-02-21 NOTE — ED NOTES
Discharge instructions reviewed with patient, noting all directions and education by provider. Patient verbalizes understanding of all information reviewed, gathered personal items, and transferred under own power off unit to lobby without incident.      Cuauhtemoc Garcia RN  09/95/79 [id number]

## 2023-02-21 NOTE — ED TRIAGE NOTES
Mode of arrival (squad #, walk in, police, etc) : walk in        Chief complaint(s): cough, SOB, emesis        Arrival Note (brief scenario, treatment PTA, etc). : pt states she recently had bronchitis and now has above symptoms. Pt states she coughs so much she vomits. Pt alert and oriented x4. No respiratory distress noted. C= \"Have you ever felt that you should Cut down on your drinking? \"  No  A= \"Have people Annoyed you by criticizing your drinking? \"  No  G= \"Have you ever felt bad or Guilty about your drinking? \"  No  E= \"Have you ever had a drink as an Eye-opener first thing in the morning to steady your nerves or to help a hangover? \"  No      Deferred []      Reason for deferring: N/A    *If yes to two or more: probable alcohol abuse. *

## 2023-02-21 NOTE — DISCHARGE INSTRUCTIONS
We evaluated you for your cough. Your COVID and flu test were negative. Your chest x-ray was normal.  We suspect this is due to a viral illness. Please use the cough medicine as prescribed. Please follow-up with primary care provider soon as possible. Please return the emergency room if you develop any worsening or concerning symptoms.

## 2023-02-21 NOTE — ED PROVIDER NOTES
[address]  Emergency Department Encounter  Emergency Medicine Resident     Pt Name:Stephanie Marcial  MRN: [id number]  [place] 1980  Date of evaluation: 2/20/23  PCP:  Ivelisse Mccallum MD  Note Started: 9:55 PM EST      CHIEF COMPLAINT       Chief Complaint   Patient presents with    Cough    Shortness of Breath    Emesis       HISTORY OF PRESENT ILLNESS  (Location/Symptom, Timing/Onset, Context/Setting, Quality, Duration, Modifying Factors, Severity.)      Jonathan Miller is a 43 y.o. female who presents with cough and congestion for the past 2 days. Patient states symptoms were mild yesterday however today the cough has been very forceful. States has been coughing steroid she has vomited afterwards. Denies any fevers or chills. Denies any sick contacts. States she has had some chest pain but only when she has been coughing. Patient states she recently got over bronchitis about 6 weeks ago. 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 PAST MEDICAL / SURGICAL / SOCIAL / FAMILY HISTORY      has a past medical history of Acute medial meniscus tear of right knee, Allergies, Arthritis, Kidney stone, Kidney stones, Liver mass, Migraine, POTS (postural orthostatic tachycardia syndrome), Seizures ([address] and Wellness examination. has a past surgical history that includes Cholecystectomy; Appendectomy; Endometrial ablation; Cystocopy (06/068/2016); Hysterectomy; Knee arthroscopy (Right, 10/19/2021); Knee arthroscopy (Right, 10/19/2021); and liver biopsy.       Social History     Socioeconomic History    Marital status:      Spouse name: Not on file    Number of children: Not on file    Years of education: Not on file    Highest education level: Not on file   Occupational History    Not on file   Tobacco Use    Smoking status: Never    Smokeless tobacco: Never Vaping Use    Vaping Use: Never used   Substance and Sexual Activity    Alcohol use: No    Drug use: No    Sexual activity: Not on file   Other Topics Concern    Not on file   Social History Narrative    Not on file     Social Determinants of Health     Financial Resource Strain: Low Risk     Difficulty of Paying Living Expenses: Not hard at all   Food Insecurity: No Food Insecurity    Worried About Running Out of Food in the Last Year: Never true    Ran Out of Food in the Last Year: Never true   Transportation Needs: Not on file   Physical Activity: Not on file   Stress: Not on file   Social Connections: Not on file   Intimate Partner Violence: Not on file   Housing Stability: Not on file       Family History   Problem Relation Age of Onset    Thyroid Disease Mother     Uterine Cancer Mother        Allergies: Other    Home Medications:  Prior to Admission medications    Medication Sig Start Date End Date Taking? Authorizing Provider   guaiFENesin (MUCINEX) 600 MG extended release tablet Take 1 tablet by mouth 2 times daily for 15 days 2/20/23 3/7/23 Yes Sharyle Glaser, DO   benzonatate (TESSALON PERLES) 100 MG capsule Take 1 capsule by mouth 3 times daily as needed for Cough 2/20/23 2/27/23 Yes Mamie Mcneill, DO   meloxicam (MOBIC) 15 MG tablet TAKE 1 TABLET BY MOUTH ONCE DAILY (AVOID  TAKING  TREXIMET) 2/20/23   Azra Vasquez MD   SUMAtriptan (IMITREX) 50 MG tablet TAKE ONE TABLET BY MOUTH AT ONSET OF A HEADACHE.  MAY REPEAT ONCE IN 2 HOURS (ALONG WITH NAPROXEN) MAX 2 DOSES IN 24 HOURS. 2/20/23   Azra Vasquez MD   vitamin D (ERGOCALCIFEROL) 1.25 MG (41200 UT) CAPS capsule TAKE ONE CAPSULE BY MOUTH ONCE WEEKLY 2/20/23   Azra Vasquez MD   cyanocobalamin 1000 MCG tablet Take 1 tablet by mouth once daily 2/9/23   Azra Vasquez MD   acetaminophen (TYLENOL) 500 MG tablet Take 2 tablets by mouth every 6 hours as needed for Pain 1/23/23 1/30/23  Mane Almaguer, DO   naproxen (NAPROSYN) 500 MG tablet Take 1 tablet by mouth in the morning and 1 tablet in the evening. Take with meals. 7/26/22   ANA Barrientos - CNP   cetirizine (ZYRTEC) 10 MG tablet Take 1 tablet by mouth in the morning and at bedtime 7/9/22   Historical Provider, MD   Azelastine HCl 137 MCG/SPRAY SOLN 1-2 sprays 2 times daily 6/7/22   Historical Provider, MD   midodrine (PROAMATINE) 5 MG tablet 5 mg  in the morning and 5 mg at noon and 5 mg before bedtime. Pt takes at 0600, 1130, 1630. 7/6/22   Historical Provider, MD   topiramate (TOPAMAX) 100 MG tablet Take 1 1/2 po bid 6/14/22   Halina Saint, MD   BIOTIN 5000 PO Take by mouth daily    Historical Provider, MD   Fluticasone Propionate (FLONASE ALLERGY RELIEF NA) 1 spray by Nasal route daily    Historical Provider, MD       REVIEW OF SYSTEMS       Review of Systems   Constitutional:  Negative for chills and fever. HENT:  Positive for congestion. Eyes:  Negative for visual disturbance. Respiratory:  Positive for cough. Cardiovascular:  Positive for chest pain. Gastrointestinal:  Positive for vomiting. Negative for abdominal pain. Musculoskeletal:  Negative for back pain and neck pain. Skin:  Negative for rash. Neurological:  Negative for headaches. Psychiatric/Behavioral:  Negative for confusion. PHYSICAL EXAM      INITIAL VITALS:   BP (!) 133/93   Pulse 98   Temp 98.6 °F (37 °C) (Oral)   Resp 17   Wt 250 lb (113.4 kg)   SpO2 94%   BMI 41.60 kg/m²     Physical Exam  Vitals reviewed. Constitutional:       General: She is not in acute distress. Appearance: Normal appearance. She is not ill-appearing. Comments: Frequently coughing throughout exam   HENT:      Head: Normocephalic and atraumatic. Right Ear: External ear normal.      Left Ear: External ear normal.      Nose: Nose normal.      Mouth/Throat:      Mouth: Mucous membranes are moist.   Eyes:      General:         Right eye: No discharge. Left eye: No discharge. Cardiovascular:      Rate and Rhythm: Normal rate and regular rhythm. Pulses: Normal pulses. Comments: Chest wall tender to palpation  Pulmonary:      Effort: Pulmonary effort is normal. No respiratory distress. Breath sounds: Normal breath sounds. Abdominal:      General: There is no distension. Palpations: Abdomen is soft. Tenderness: There is no abdominal tenderness. Musculoskeletal:      Cervical back: Normal range of motion. Comments: Moving all 4 extremities   Skin:     General: Skin is warm. Capillary Refill: Capillary refill takes less than 2 seconds. Neurological:      General: No focal deficit present. Mental Status: She is alert and oriented to person, place, and time. Cranial Nerves: No cranial nerve deficit. Psychiatric:         Mood and Affect: Mood normal.         DDX/DIAGNOSTIC RESULTS / EMERGENCY DEPARTMENT COURSE / MDM     Medical Decision Making  DDx: Viral illness, pneumonia    20-year-old female presents with cough and congestion for 1 day. Very forceful cough that is occasionally inducing vomiting. Having chest pain when she is coughing. Patient appears well initial evaluation, afebrile, stable vital signs. Lungs clear to auscultation bilaterally. Chest wall mildly tender to palpation. We will plan to swab for COVID and flu. Will obtain chest x-ray to further evaluate. Will provide symptomatic relief. Will reassess. Anticipate discharge. Amount and/or Complexity of Data Reviewed  Labs:  Decision-making details documented in ED Course. Radiology: ordered. Decision-making details documented in ED Course. Risk  OTC drugs. Prescription drug management.         EKG  None    All EKG's are interpreted by the Emergency Department Physician who either signs or Co-signs this chart in the absence of a cardiologist.    EMERGENCY DEPARTMENT COURSE:      ED Course as of 02/21/23 0016   Mon Feb 20, 2023   0500 SARS-CoV-2 RNA, RT PCR: Not Detected [AB]   0759 INFLUENZA A: Not Detected [AB]   6024 INFLUENZA B: Not Detected [AB]   2333 XR CHEST (2 VW)  IMPRESSION:  No acute process. [AB]   7043 Reevaluated patient. Updated on negative COVID and flu testing as well as negative chest x-ray. Patient states she still needed cough. Explained this is likely due to a viral illness. We will plan on discharge at this time with close follow-up with primary care provider. Given strict return precautions. Provided with work note. Patient agreed with discharge plan. [AB]      ED Course User Index  [AB] Leilani Huddleston DO       PROCEDURES:  None    CONSULTS:  None    CRITICAL CARE:  There was significant risk of life threatening deterioration of patient's condition requiring my direct management. Critical care time 0 minutes, excluding any documented procedures. FINAL IMPRESSION      1. Cough, unspecified type    2.  Viral illness          DISPOSITION / PLAN     DISPOSITION Decision To Discharge 02/20/2023 11:33:13 PM      PATIENT REFERRED TO:  [facility] ED  [facility] [address] [place] [address]  [phone]  Go to   If symptoms worsen    Ivelisse Mccallum MD  [address] [address] [address]  [address] [facility] [address]  [phone]    Schedule an appointment as soon as possible for a visit in 3 days      DISCHARGE MEDICATIONS:  Discharge Medication List as of 2/20/2023 11:34 PM        START taking these medications    Details   guaiFENesin (MUCINEX) 600 MG extended release tablet Take 1 tablet by mouth 2 times daily for 15 days, Disp-30 tablet, R-0Normal      benzonatate (TESSALON PERLES) 100 MG capsule Take 1 capsule by mouth 3 times daily as needed for Cough, Disp-30 capsule, R-0Normal             Marc De Jesus DO  Emergency Medicine Resident    (Please note that portions of thisnote were completed with a voice recognition program.  Efforts were made to edit the dictations but occasionally words are mis-transcribed.)        Leilani Huddleston DO  Resident  02/21/23 5593

## 2023-02-22 ENCOUNTER — HOSPITAL ENCOUNTER (EMERGENCY)
Age: 43
Discharge: HOME | End: 2023-02-22
Attending: EMERGENCY MEDICINE
Payer: COMMERCIAL

## 2023-02-22 ENCOUNTER — APPOINTMENT (OUTPATIENT)
Dept: GENERAL RADIOLOGY | Age: 43
End: 2023-02-22
Payer: COMMERCIAL

## 2023-02-22 VITALS
SYSTOLIC BLOOD PRESSURE: 115 MMHG | HEART RATE: 60 BPM | RESPIRATION RATE: 12 BRPM | BODY MASS INDEX: 41.65 KG/M2 | HEIGHT: 65 IN | TEMPERATURE: 100 F | WEIGHT: 250 LBS | OXYGEN SATURATION: 93 % | DIASTOLIC BLOOD PRESSURE: 75 MMHG

## 2023-02-22 DIAGNOSIS — J18.9 MULTIFOCAL PNEUMONIA: Primary | ICD-10-CM

## 2023-02-22 LAB
ABSOLUTE EOS #: 0.6 K/UL (ref 0–0.4)
ABSOLUTE LYMPH #: 1.1 K/UL (ref 1–4.8)
ABSOLUTE MONO #: 0.6 K/UL (ref 0.1–1.3)
ANION GAP SERPL CALCULATED.3IONS-SCNC: 10 MMOL/L (ref 9–17)
BASOPHILS # BLD: 1 % (ref 0–2)
BASOPHILS ABSOLUTE: 0 K/UL (ref 0–0.2)
BUN SERPL-MCNC: 9 MG/DL (ref 6–20)
CALCIUM SERPL-MCNC: 8.7 MG/DL (ref 8.6–10.4)
CHLORIDE SERPL-SCNC: 104 MMOL/L (ref 98–107)
CO2 SERPL-SCNC: 23 MMOL/L (ref 20–31)
CREAT SERPL-MCNC: 0.82 MG/DL (ref 0.5–0.9)
EOSINOPHILS RELATIVE PERCENT: 9 % (ref 0–4)
FLUAV RNA RESP QL NAA+PROBE: NOT DETECTED
FLUBV RNA RESP QL NAA+PROBE: NOT DETECTED
GFR SERPL CREATININE-BSD FRML MDRD: >60 ML/MIN/1.73M2
GLUCOSE SERPL-MCNC: 93 MG/DL (ref 70–99)
HCT VFR BLD AUTO: 43.1 % (ref 36–46)
HGB BLD-MCNC: 14 G/DL (ref 12–16)
LYMPHOCYTES # BLD: 18 % (ref 24–44)
MCH RBC QN AUTO: 28.2 PG (ref 26–34)
MCHC RBC AUTO-ENTMCNC: 32.5 G/DL (ref 31–37)
MCV RBC AUTO: 86.6 FL (ref 80–100)
MONOCYTES # BLD: 9 % (ref 1–7)
PDW BLD-RTO: 14 % (ref 11.5–14.9)
PLATELET # BLD AUTO: 243 K/UL (ref 150–450)
PMV BLD AUTO: 7.6 FL (ref 6–12)
POTASSIUM SERPL-SCNC: 4 MMOL/L (ref 3.7–5.3)
RBC # BLD: 4.98 M/UL (ref 4–5.2)
SARS-COV-2 RNA RESP QL NAA+PROBE: NOT DETECTED
SEG NEUTROPHILS: 63 % (ref 36–66)
SEGMENTED NEUTROPHILS ABSOLUTE COUNT: 4 K/UL (ref 1.3–9.1)
SODIUM SERPL-SCNC: 137 MMOL/L (ref 135–144)
SOURCE: NORMAL
SPECIMEN DESCRIPTION: NORMAL
TROPONIN I SERPL DL<=0.01 NG/ML-MCNC: <6 NG/L (ref 0–14)
WBC # BLD AUTO: 6.3 K/UL (ref 3.5–11)

## 2023-02-22 PROCEDURE — 6370000000 HC RX 637 (ALT 250 FOR IP): Performed by: STUDENT IN AN ORGANIZED HEALTH CARE EDUCATION/TRAINING PROGRAM

## 2023-02-22 PROCEDURE — 71046 X-RAY EXAM CHEST 2 VIEWS: CPT

## 2023-02-22 PROCEDURE — 36415 COLL VENOUS BLD VENIPUNCTURE: CPT

## 2023-02-22 PROCEDURE — 93005 ELECTROCARDIOGRAM TRACING: CPT | Performed by: STUDENT IN AN ORGANIZED HEALTH CARE EDUCATION/TRAINING PROGRAM

## 2023-02-22 PROCEDURE — 84484 ASSAY OF TROPONIN QUANT: CPT

## 2023-02-22 PROCEDURE — 80048 BASIC METABOLIC PNL TOTAL CA: CPT

## 2023-02-22 PROCEDURE — 87636 SARSCOV2 & INF A&B AMP PRB: CPT

## 2023-02-22 PROCEDURE — 6360000002 HC RX W HCPCS: Performed by: STUDENT IN AN ORGANIZED HEALTH CARE EDUCATION/TRAINING PROGRAM

## 2023-02-22 PROCEDURE — 96374 THER/PROPH/DIAG INJ IV PUSH: CPT

## 2023-02-22 PROCEDURE — 2580000003 HC RX 258: Performed by: STUDENT IN AN ORGANIZED HEALTH CARE EDUCATION/TRAINING PROGRAM

## 2023-02-22 PROCEDURE — 99285 EMERGENCY DEPT VISIT HI MDM: CPT

## 2023-02-22 PROCEDURE — 85025 COMPLETE CBC W/AUTO DIFF WBC: CPT

## 2023-02-22 RX ORDER — 0.9 % SODIUM CHLORIDE 0.9 %
1000 INTRAVENOUS SOLUTION INTRAVENOUS ONCE
Status: COMPLETED | OUTPATIENT
Start: 2023-02-22 | End: 2023-02-22

## 2023-02-22 RX ORDER — KETOROLAC TROMETHAMINE 30 MG/ML
30 INJECTION, SOLUTION INTRAMUSCULAR; INTRAVENOUS ONCE
Status: COMPLETED | OUTPATIENT
Start: 2023-02-22 | End: 2023-02-22

## 2023-02-22 RX ORDER — BENZONATATE 100 MG/1
100 CAPSULE ORAL ONCE
Status: COMPLETED | OUTPATIENT
Start: 2023-02-22 | End: 2023-02-22

## 2023-02-22 RX ORDER — AZITHROMYCIN 250 MG/1
250 TABLET, FILM COATED ORAL SEE ADMIN INSTRUCTIONS
Qty: 6 TABLET | Refills: 0 | Status: SHIPPED | OUTPATIENT
Start: 2023-02-22 | End: 2023-02-27

## 2023-02-22 RX ORDER — ACETAMINOPHEN 500 MG
1000 TABLET ORAL ONCE
Status: COMPLETED | OUTPATIENT
Start: 2023-02-22 | End: 2023-02-22

## 2023-02-22 RX ADMIN — SODIUM CHLORIDE 1000 ML: 9 INJECTION, SOLUTION INTRAVENOUS at 17:59

## 2023-02-22 RX ADMIN — ACETAMINOPHEN 1000 MG: 500 TABLET ORAL at 16:59

## 2023-02-22 RX ADMIN — KETOROLAC TROMETHAMINE 30 MG: 30 INJECTION, SOLUTION INTRAMUSCULAR; INTRAVENOUS at 17:57

## 2023-02-22 RX ADMIN — BENZONATATE 100 MG: 100 CAPSULE ORAL at 16:59

## 2023-02-22 ASSESSMENT — PAIN SCALES - GENERAL
PAINLEVEL_OUTOF10: 8
PAINLEVEL_OUTOF10: 8

## 2023-02-22 ASSESSMENT — ENCOUNTER SYMPTOMS
VOMITING: 0
SHORTNESS OF BREATH: 1
BACK PAIN: 0
COUGH: 1
ABDOMINAL PAIN: 0

## 2023-02-22 ASSESSMENT — PAIN DESCRIPTION - LOCATION
LOCATION: CHEST
LOCATION: CHEST

## 2023-02-22 ASSESSMENT — PAIN DESCRIPTION - DESCRIPTORS: DESCRIPTORS: STABBING

## 2023-02-22 NOTE — ED NOTES
Mode of arrival (squad #, walk in, police, etc) : walk in        Chief complaint(s): SOB, cough, fever        Arrival Note (brief scenario, treatment PTA, etc). : Pt states she was seen on Monday and has not had any improvement in symptoms. C= \"Have you ever felt that you should Cut down on your drinking? \"  No  A= \"Have people Annoyed you by criticizing your drinking? \"  No  G= \"Have you ever felt bad or Guilty about your drinking? \"  No  E= \"Have you ever had a drink as an Eye-opener first thing in the morning to steady your nerves or to help a hangover? \"  No      Deferred []      Reason for deferring: N/A    *If yes to two or more: probable alcohol abuse. *       Sheng Lock RN  02/22/23 7329

## 2023-02-22 NOTE — DISCHARGE INSTRUCTIONS
Your work-up here was concerning for possible pneumonia on the chest x-ray. We have started you on some antibiotics. Please complete the course. Please follow with your primary care provider soon as possible. Please return to the emergency room if you develop any worsening or concerning symptoms.

## 2023-02-22 NOTE — LETTER
[facility] [place] [facility]  [address] [facility]  [address] [place] [address]  Phone: [phone]               February 22, 2023    Patient: Denton Haddad   YOB: 1980   Date of Visit: 2/22/2023       To Whom It May Concern:    Josh Urbina was seen and treated in our emergency department on 2/22/2023. She may return to work on 02/27/2023.       Sincerely,             Signature:__________________________________

## 2023-02-22 NOTE — ED PROVIDER NOTES
[address]  Emergency Department Encounter  Emergency Medicine Resident     Pt Name:Stephanie Goode  MRN: [id number]  [place] 1980  Date of evaluation: 2/22/23  PCP:  No primary care provider on file. Note Started: 4:48 PM EST      CHIEF COMPLAINT       Chief Complaint   Patient presents with    Cough    Fever       HISTORY OF PRESENT ILLNESS  (Location/Symptom, Timing/Onset, Context/Setting, Quality, Duration, Modifying Factors, Severity.)      Ariel Golden is a 43 y.o. female who presents with cough, congestion, fevers. Patient was actually seen in the emergency department 2 days ago where she had negative COVID and flu swabs and a negative chest x-ray. Was discharged with symptomatic relief however feels like she is getting worse. Now having fevers. Did note a temperature of 102 earlier today, responded well to Tylenol, has not taken any Tylenol since this morning. Having generalized myalgias. States her daughter has a sinus infection however denies any other sick contacts. Now having left-sided chest pain as well as feeling short of breath. Also having left-sided jaw pain. No prior cardiac history. No asthma or COPD. Non-smoker. PAST MEDICAL / SURGICAL / SOCIAL / FAMILY HISTORY      has a past medical history of Acute medial meniscus tear of right knee, Allergies, Arthritis, Kidney stone, Kidney stones, Liver mass, Migraine, POTS (postural orthostatic tachycardia syndrome), Seizures ([address] and Wellness examination. has a past surgical history that includes Cholecystectomy; Appendectomy; Endometrial ablation; Cystocopy (06/068/2016); Hysterectomy; Knee arthroscopy (Right, 10/19/2021); Knee arthroscopy (Right, 10/19/2021); and liver biopsy.       Social History     Socioeconomic History    Marital status:      Spouse name: Not on file    Number of children: Not on file    Years of education: Not on file    Highest education level: Not on file   Occupational History    Not on file   Tobacco Use    Smoking status: Never    Smokeless tobacco: Never   Vaping Use    Vaping Use: Never used   Substance and Sexual Activity    Alcohol use: No    Drug use: No    Sexual activity: Not on file   Other Topics Concern    Not on file   Social History Narrative    Not on file     Social Determinants of Health     Financial Resource Strain: Low Risk     Difficulty of Paying Living Expenses: Not hard at all   Food Insecurity: No Food Insecurity    Worried About Running Out of Food in the Last Year: Never true    Ran Out of Food in the Last Year: Never true   Transportation Needs: Not on file   Physical Activity: Not on file   Stress: Not on file   Social Connections: Not on file   Intimate Partner Violence: Not on file   Housing Stability: Not on file       Family History   Problem Relation Age of Onset    Thyroid Disease Mother     Uterine Cancer Mother        Allergies:  Other    Home Medications:  Prior to Admission medications    Medication Sig Start Date End Date Taking? Authorizing Provider   azithromycin (ZITHROMAX) 250 MG tablet Take 1 tablet by mouth See Admin Instructions for 5 days 500mg on day 1 followed by 250mg on days 2 - 5 2/22/23 2/27/23 Yes Mamie Warren DO   meloxicam (MOBIC) 15 MG tablet TAKE 1 TABLET BY MOUTH ONCE DAILY (AVOID  TAKING  TREXIMET) 2/20/23   Maurice Dickens MD   SUMAtriptan (IMITREX) 50 MG tablet TAKE ONE TABLET BY MOUTH AT ONSET OF A HEADACHE. MAY REPEAT ONCE IN 2 HOURS (ALONG WITH NAPROXEN) MAX 2 DOSES IN 24 HOURS. 2/20/23   Maurice Dickens MD   vitamin D (ERGOCALCIFEROL) 1.25 MG (40771 UT) CAPS capsule TAKE ONE CAPSULE BY MOUTH ONCE WEEKLY 2/20/23   Maurice Dickens MD   guaiFENesin (MUCINEX) 600 MG extended release tablet Take 1 tablet by mouth 2 times daily for 15 days 2/20/23 3/7/23  Mamie Warren DO   benzonatate (TESSALON PERLES) 100 MG capsule Take 1 capsule by mouth 3 times daily as needed for Cough 2/20/23 2/27/23  Mamie CABEZAS  Kushal Pena, DO   cyanocobalamin 1000 MCG tablet Take 1 tablet by mouth once daily 2/9/23   Pamela Roberto MD   acetaminophen (TYLENOL) 500 MG tablet Take 2 tablets by mouth every 6 hours as needed for Pain 1/23/23 1/30/23  Mane Almaguer,    naproxen (NAPROSYN) 500 MG tablet Take 1 tablet by mouth in the morning and 1 tablet in the evening. Take with meals. 7/26/22   ANA Powers CNP   cetirizine (ZYRTEC) 10 MG tablet Take 1 tablet by mouth in the morning and at bedtime 7/9/22   Historical Provider, MD   Azelastine HCl 137 MCG/SPRAY SOLN 1-2 sprays 2 times daily 6/7/22   Historical Provider, MD   midodrine (PROAMATINE) 5 MG tablet 5 mg  in the morning and 5 mg at noon and 5 mg before bedtime. Pt takes at 0600, 1130, 1630. 7/6/22   Historical Provider, MD   topiramate (TOPAMAX) 100 MG tablet Take 1 1/2 po bid 6/14/22   Rashard Allen MD   BIOTIN 5000 PO Take by mouth daily    Historical Provider, MD   Fluticasone Propionate (FLONASE ALLERGY RELIEF NA) 1 spray by Nasal route daily    Historical Provider, MD       REVIEW OF SYSTEMS       Review of Systems   Constitutional:  Positive for fever. HENT:  Positive for congestion. Eyes:  Negative for visual disturbance. Respiratory:  Positive for cough and shortness of breath. Cardiovascular:  Positive for chest pain. Gastrointestinal:  Negative for abdominal pain and vomiting. Musculoskeletal:  Negative for back pain. Skin:  Negative for rash. Neurological:  Negative for headaches. Psychiatric/Behavioral:  Negative for confusion. PHYSICAL EXAM      INITIAL VITALS:   /76   Pulse 67   Temp 100 °F (37.8 °C)   Resp 18   Ht 5' 5\" (1.651 m)   Wt 250 lb (113.4 kg)   SpO2 93%   BMI 41.60 kg/m²     Physical Exam  Vitals reviewed. Constitutional:       General: She is not in acute distress. Appearance: Normal appearance. She is not ill-appearing. HENT:      Head: Normocephalic and atraumatic.       Right Ear: External ear normal. Left Ear: External ear normal.      Mouth/Throat:      Mouth: Mucous membranes are moist.   Eyes:      General:         Right eye: No discharge. Left eye: No discharge. Cardiovascular:      Rate and Rhythm: Normal rate and regular rhythm. Pulses: Normal pulses. Pulmonary:      Effort: Pulmonary effort is normal.      Breath sounds: Normal breath sounds. Comments: Frequently coughing throughout exam, mildly tachypneic, saturating well on room air  Abdominal:      General: There is no distension. Palpations: Abdomen is soft. Tenderness: There is no abdominal tenderness. Musculoskeletal:      Cervical back: Normal range of motion. Comments: Moving all 4 extremities   Skin:     General: Skin is warm. Capillary Refill: Capillary refill takes less than 2 seconds. Neurological:      General: No focal deficit present. Mental Status: She is alert and oriented to person, place, and time. Cranial Nerves: No cranial nerve deficit. Psychiatric:         Mood and Affect: Mood normal.         DDX/DIAGNOSTIC RESULTS / EMERGENCY DEPARTMENT COURSE / MDM     Medical Decision Making  DDx: Viral illness, pneumonia, pneumothorax, ACS, musculoskeletal pain    55-year-old female presents for cough, congestion, fevers. Seen here few days ago for similar symptoms, had negative viral swabs and negative x-ray at that time. Feels like she is worsening. Patient appears well on initial evaluation, borderline febrile, tachypneic, otherwise stable vital signs, saturating well on room air. We will plan to broaden work-up, obtain basic labs, EKG, repeat chest x-ray due to fevers. Provide fluids, symptomatic relief. Will reassess. Amount and/or Complexity of Data Reviewed  Labs: ordered. Decision-making details documented in ED Course. Radiology: ordered. Decision-making details documented in ED Course. ECG/medicine tests: ordered. Risk  OTC drugs.   Prescription drug management. EKG  EKG Interpretation    Interpreted by me    Rhythm: normal sinus   Rate: 72  Axis: normal  Ectopy: none  Conduction: normal  ST Segments: no acute change  T Waves: no acute change  Q Waves: none    Clinical Impression: no acute changes and normal EKG    All EKG's are interpreted by the Emergency Department Physician who either signs or Co-signs this chart in the absence of a cardiologist.    EMERGENCY DEPARTMENT COURSE:      ED Course as of 02/22/23 1854 Wed Feb 22, 2023   1746 WBC: 6.3 [AB]   1746 Hemoglobin Quant: 14.0 [AB]   1746 Troponin, High Sensitivity: <6 [AB]   1746 Creatinine: 0.82 [AB]   1803 XR CHEST (2 VW)  IMPRESSION:  Patchy bilateral airspace opacities, left worse than right, concerning for  multifocal pneumonia with atypical/viral pneumonia in the differential. [AB]   1811 Chest x-ray does appear worse from a few days ago. We will plan to reswab for COVID. If COVID is negative will start antibiotics [AB]   1851 SARS-CoV-2 RNA, RT PCR: Not Detected [AB]   9785 INFLUENZA A: Not Detected [AB]   9308 INFLUENZA B: Not Detected [AB]   6349 We will start patient on antibiotics for concern for pneumonia as repeat viral swabs are negative. Will discharge at this time. Advise close follow-up with primary care provider. Given strict return precautions. Patient agreed with plan. [AB]      ED Course User Index  [AB] Heidy Gavin DO       PROCEDURES:  None    CONSULTS:  None    CRITICAL CARE:  There was significant risk of life threatening deterioration of patient's condition requiring my direct management. Critical care time 0 minutes, excluding any documented procedures. FINAL IMPRESSION      1.  Multifocal pneumonia          DISPOSITION / PLAN     DISPOSITION Decision To Discharge 02/22/2023 06:51:40 PM      PATIENT REFERRED TO:  [facility] ED  [address] [address] [phone]  Go to   [facility] symptoms worsen    [facility]  [address] [facility] [address] [phone]  Schedule an appointment as soon as possible for a visit in 3 days      DISCHARGE MEDICATIONS:  New Prescriptions    AZITHROMYCIN (ZITHROMAX) 250 MG TABLET    Take 1 tablet by mouth See Admin Instructions for 5 days 500mg on day 1 followed by 250mg on days 2 - 4[address] [facility], DO  Emergency Medicine Resident    (Please note that portions of thisnote were completed with a voice recognition program.  Efforts were made to edit the dictations but occasionally words are mis-transcribed.)        Sharyle Glaser, DO  Resident  02/22/23 5577

## 2023-02-22 NOTE — ED PROVIDER NOTES
[address] ED  eMERGENCY dEPARTMENT eNCOUnter   Attending Attestation     Pt Name: Ariel Golden  MRN: [id number]  [place] 1980  Date of evaluation: 2/22/23       Ariel Golden is a 43 y.o. female who presents with Cough and Fever      History:   Patient is here complaining of fevers cough and shortness of breath with some chest pain. Patient was seen here few days ago for similar symptoms was diagnosed with URI and discharged home and states she is getting worse. Exam: Vitals:   Vitals:    02/22/23 1651   BP: (!) 152/112   Pulse: 88   Resp: 26   Temp: 100 °F (37.8 °C)   SpO2: 97%   Weight: 250 lb (113.4 kg)   Height: 5' 5\" (1.651 m)     Heart regular rate rhythm no murmurs. Lungs clear to auscultation bilaterally. I performed a history and physical examination of the patient and discussed management with the resident. I reviewed the residents note and agree with the documented findings and plan of care. Any areas of disagreement are noted on the chart. I was personally present for the key portions of any procedures. I have documented in the chart those procedures where I was not present during the key portions. I have personally reviewed all images and agree with the resident's interpretation. I have reviewed the emergency nurses triage note. I agree with the chief complaint, past medical history, past surgical history, allergies, medications, social and family history as documented unless otherwise noted below. Documentation of the HPI, Physical Exam and Medical Decision Making performed by medical students or scribes is based on my personal performance of the HPI, PE and MDM. I personally evaluated and examined the patient in conjunction with the APC and agree with the assessment, treatment plan, and disposition of the patient as recorded by the APC. Additional findings are as noted.     Liz Marshall MD  Attending Emergency  Physician             Pushpa Cox MD  02/22/23 2840

## 2023-02-23 ENCOUNTER — OFFICE VISIT (OUTPATIENT)
Dept: UROLOGY | Age: 43
End: 2023-02-23
Payer: COMMERCIAL

## 2023-02-23 VITALS
HEART RATE: 90 BPM | SYSTOLIC BLOOD PRESSURE: 134 MMHG | WEIGHT: 250 LBS | BODY MASS INDEX: 41.65 KG/M2 | DIASTOLIC BLOOD PRESSURE: 73 MMHG | HEIGHT: 65 IN

## 2023-02-23 DIAGNOSIS — Z87.442 HISTORY OF KIDNEY STONES: Primary | ICD-10-CM

## 2023-02-23 LAB
EKG ATRIAL RATE: 72 BPM
EKG P AXIS: 41 DEGREES
EKG P-R INTERVAL: 170 MS
EKG Q-T INTERVAL: 410 MS
EKG QRS DURATION: 78 MS
EKG QTC CALCULATION (BAZETT): 448 MS
EKG R AXIS: 8 DEGREES
EKG T AXIS: 24 DEGREES
EKG VENTRICULAR RATE: 72 BPM

## 2023-02-23 PROCEDURE — G8484 FLU IMMUNIZE NO ADMIN: HCPCS | Performed by: NURSE PRACTITIONER

## 2023-02-23 PROCEDURE — 93010 ELECTROCARDIOGRAM REPORT: CPT | Performed by: INTERNAL MEDICINE

## 2023-02-23 PROCEDURE — 1036F TOBACCO NON-USER: CPT | Performed by: NURSE PRACTITIONER

## 2023-02-23 PROCEDURE — 99213 OFFICE O/P EST LOW 20 MIN: CPT | Performed by: NURSE PRACTITIONER

## 2023-02-23 PROCEDURE — G8417 CALC BMI ABV UP PARAM F/U: HCPCS | Performed by: NURSE PRACTITIONER

## 2023-02-23 PROCEDURE — G8428 CUR MEDS NOT DOCUMENT: HCPCS | Performed by: NURSE PRACTITIONER

## 2023-02-23 ASSESSMENT — ENCOUNTER SYMPTOMS
WHEEZING: 0
BACK PAIN: 0
EYE PAIN: 0
SHORTNESS OF BREATH: 0
CONSTIPATION: 0
EYE REDNESS: 0
COUGH: 1
GASTROINTESTINAL NEGATIVE: 1
DIARRHEA: 0
ABDOMINAL PAIN: 0
EYES NEGATIVE: 1
NAUSEA: 0
VOMITING: 0

## 2023-02-23 NOTE — PROGRESS NOTES
Review of Systems   Constitutional: Negative. Negative for appetite change, chills and fever. Eyes: Negative. Negative for pain, redness and visual disturbance. Respiratory:  Positive for cough. Negative for shortness of breath and wheezing. Cardiovascular: Negative. Negative for chest pain and leg swelling. Gastrointestinal: Negative. Negative for abdominal pain, constipation, diarrhea, nausea and vomiting. Genitourinary:  Negative for difficulty urinating, dysuria, flank pain, frequency, hematuria and urgency. Musculoskeletal: Negative. Negative for back pain, joint swelling and myalgias. Skin: Negative. Negative for rash and wound. Neurological: Negative. Negative for dizziness, tremors and numbness. Hematological: Negative. Does not bruise/bleed easily.

## 2023-02-23 NOTE — PROGRESS NOTES
[address] [address]  Dept:  [facility] Urology Office Note - Established    Patient:  Oralia Snow  YOB: 1980  Date: 2/23/2023    The patient is a 43 y.o. female whopresents today for evaluation of the following problems:   Chief Complaint   Patient presents with    6 Month Follow-Up     6 month with US       HPI  Patient is presenting for routine follow-up kidney stones. She previously received treatment by Dr. Frederic Webber in 2016 for this issue. Patient had a CT abdomen and pelvis back in the summer which showed a punctate punctate nonobstructing stone in the right kidney. She had a recent renal ultrasound which was negative. Patient denies passing any stones over the last 6 months. She denies any issues with urinary tract infections over the last 6 months. She has no hematuria, dysuria, flank pain, fever or chills. She is otherwise voiding without any complaints and denies any other  concerns today. Summary of old records: N/A    Additional History: N/A    Procedures Today: N/A    Urinalysis today:  No results found for this visit on 02/23/23.     Imaging Reviewed during this Office Visit:   7/22/22 ct abd/pelvis without contrast  2/20/23 renal US      Last BUN and creatinine:  Lab Results   Component Value Date    BUN 9 02/22/2023     Lab Results   Component Value Date    CREATININE 0.82 02/22/2023       Additional Lab/Culture results: none    PAST MEDICAL, FAMILY AND SOCIAL HISTORY UPDATE:  Past Medical History:   Diagnosis Date    Acute medial meniscus tear of right knee     Allergies     dust, cockroaches, cigarettes, spider bites   Dr. Ochoa [facility]    Arthritis     right knee    Kidney stone 6/8/2016    Kidney stones     Liver mass     Migraine     POTS (postural orthostatic tachycardia syndrome)     Dr. Sybil Kuhn. V last visit 6/2021    Seizures ([address]     started 3/04/2021 Dr. Haleigh Galarza. last visit 9/02/2021    Wellness examination 07/14/2021    [place] Olivia CNP with Dr. Pancho Heart     Past Surgical History:   Procedure Laterality Date    APPENDECTOMY      CHOLECYSTECTOMY      CYSTOSCOPY  06/068/2016    lt ureteroscopy with holmium laser lithotripsy and lt ureteral stent    ENDOMETRIAL ABLATION      HYSTERECTOMY (CERVIX STATUS UNKNOWN)      partial    KNEE ARTHROSCOPY Right 10/19/2021    KNEE ARTHROSCOPY, PARTIAL MEDIAL MENISECTOMY, PARTIAL LATERAL RELEASE    KNEE ARTHROSCOPY Right 10/19/2021    KNEE ARTHROSCOPY, PARTIAL MEDIAL MENISECTOMY, PARTIAL LATERAL RELEASE  (3080 TABLE, SUPINE) performed by Dilma Reilly DO at [address] [facility]       Family History   Problem Relation Age of Onset    Thyroid Disease Mother     Uterine Cancer Mother      Outpatient Medications Marked as Taking for the 2/23/23 encounter (Office Visit) with ANA Wang CNP   Medication Sig Dispense Refill    azithromycin (ZITHROMAX) 250 MG tablet Take 1 tablet by mouth See Admin Instructions for 5 days 500mg on day 1 followed by 250mg on days 2 - 5 6 tablet 0    meloxicam (MOBIC) 15 MG tablet TAKE 1 TABLET BY MOUTH ONCE DAILY (AVOID  TAKING  TREXIMET) 30 tablet 0    SUMAtriptan (IMITREX) 50 MG tablet TAKE ONE TABLET BY MOUTH AT ONSET OF A HEADACHE.  MAY REPEAT ONCE IN 2 HOURS (ALONG WITH NAPROXEN) MAX 2 DOSES IN 24 HOURS. 9 tablet 0    vitamin D (ERGOCALCIFEROL) 1.25 MG (03340 UT) CAPS capsule TAKE ONE CAPSULE BY MOUTH ONCE WEEKLY 12 capsule 0    guaiFENesin (MUCINEX) 600 MG extended release tablet Take 1 tablet by mouth 2 times daily for 15 days 30 tablet 0    benzonatate (TESSALON PERLES) 100 MG capsule Take 1 capsule by mouth 3 times daily as needed for Cough 30 capsule 0    cyanocobalamin 1000 MCG tablet Take 1 tablet by mouth once daily 30 tablet 0    naproxen (NAPROSYN) 500 MG tablet Take 1 tablet by mouth in the morning and 1 tablet in the evening. Take with meals. 30 tablet 0    cetirizine (ZYRTEC) 10 MG tablet Take 1 tablet by mouth in the morning and at bedtime      Azelastine HCl 137 MCG/SPRAY SOLN 1-2 sprays 2 times daily      midodrine (PROAMATINE) 5 MG tablet 5 mg  in the morning and 5 mg at noon and 5 mg before bedtime. Pt takes at 0600, 1130, 1630. topiramate (TOPAMAX) 100 MG tablet Take 1 1/2 po bid 90 tablet 0    BIOTIN 5000 PO Take by mouth daily      Fluticasone Propionate (FLONASE ALLERGY RELIEF NA) 1 spray by Nasal route daily        (All medications reviewed and updated by provider sincelast office visit or hospitalization)   Other  Social History     Tobacco Use   Smoking Status Never   Smokeless Tobacco Never      (If patient a smoker, smoking cessation counseling offered)     Social History     Substance and Sexual Activity   Alcohol Use No       REVIEW OF SYSTEMS:  Review of Systems      Physical Exam:      Vitals:    02/23/23 1455   BP: 134/73   Pulse: 90     Body mass index is 41.6 kg/m². Patient is a 43 y.o. female in noacute distress and alert and oriented to person, place and time. Physical Exam  Constitutional: Patient in no acute distress. Neuro: Alert andoriented to person, place and time. Psych: Mood normal, affect normal  Skin: No rash noted  Lungs: Respiratory effort is normal  Cardiovascular: Warm & Pink  Abdomen: Soft, non-tender, non-distended with no CVA,  No flank tenderness  Bladder non-tender and not distended. Musculoskeletal: Normal gait and station      and Plan      1. History of kidney stones           Plan:   Kidney stones are chronic issue which is stable  Her recent renal ultrasound was negative. We will repeat KUB in 1 year. General recommendations for stone prevention reviewed. Patient will follow-up in 1 year or sooner if needed. Return in about 1 year (around 2/23/2024) for kidney stones with KUB.     Prescriptions Ordered:  No orders of the defined types were placed in this encounter.     Orders Placed:  Orders Placed This Encounter   Procedures    XR ABDOMEN (KUB) (SINGLE AP VIEW)     Standing Status:   Future     Standing Expiration Date:   8/23/2024            ANA Sin CNP    Reviewed and agree with the ROS entered by the MA.

## 2023-03-20 DIAGNOSIS — G43.009 MIGRAINE WITHOUT AURA AND WITHOUT STATUS MIGRAINOSUS, NOT INTRACTABLE: ICD-10-CM

## 2023-03-20 RX ORDER — MELOXICAM 15 MG/1
TABLET ORAL
Qty: 30 TABLET | Refills: 0 | Status: SHIPPED | OUTPATIENT
Start: 2023-03-20

## 2023-03-20 RX ORDER — SUMATRIPTAN 50 MG/1
TABLET, FILM COATED ORAL
Qty: 9 TABLET | Refills: 0 | Status: SHIPPED | OUTPATIENT
Start: 2023-03-20

## 2023-04-24 RX ORDER — MELOXICAM 15 MG/1
TABLET ORAL
Qty: 30 TABLET | Refills: 0 | Status: SHIPPED | OUTPATIENT
Start: 2023-04-24

## 2023-05-01 ENCOUNTER — HOSPITAL ENCOUNTER (OUTPATIENT)
Age: 43
Discharge: HOME | End: 2023-05-01
Payer: COMMERCIAL

## 2023-05-01 LAB
ANION GAP SERPL CALCULATED.3IONS-SCNC: 11 MMOL/L (ref 9–17)
BUN SERPL-MCNC: 15 MG/DL (ref 6–20)
CHLORIDE SERPL-SCNC: 101 MMOL/L (ref 98–107)
CO2 SERPL-SCNC: 24 MMOL/L (ref 20–31)
CREAT SERPL-MCNC: 0.77 MG/DL (ref 0.5–0.9)
GFR SERPL CREATININE-BSD FRML MDRD: >60 ML/MIN/1.73M2
POTASSIUM SERPL-SCNC: 4.3 MMOL/L (ref 3.7–5.3)
SODIUM SERPL-SCNC: 136 MMOL/L (ref 135–144)
TSH SERPL-ACNC: 2.07 UIU/ML (ref 0.3–5)

## 2023-05-01 PROCEDURE — 84520 ASSAY OF UREA NITROGEN: CPT

## 2023-05-01 PROCEDURE — 84443 ASSAY THYROID STIM HORMONE: CPT

## 2023-05-01 PROCEDURE — 80051 ELECTROLYTE PANEL: CPT

## 2023-05-01 PROCEDURE — 82565 ASSAY OF CREATININE: CPT

## 2023-05-01 PROCEDURE — 36415 COLL VENOUS BLD VENIPUNCTURE: CPT

## 2023-07-11 NOTE — TELEPHONE ENCOUNTER
1980   1.  RIGHT KNEE ARTHROSCOPY   2. RIGHT MEDIAL MENISECTOMY  Refill request pended PAST MEDICAL HISTORY:  No pertinent past medical history 3 = A little assistance

## 2023-09-12 NOTE — PROGRESS NOTES
[facility]  [facility]  [address] [facility] [address]  Dept: [phone]  Dept Fax: [phone]        Ambulatory Follow Up      Subjective:     Chief Complaint   Patient presents with    Follow-up     right knee pain        Boaz Lozada is a 36y.o. year old female who presents to our office today for routine followup regarding her   1. Chondromalacia patellae of right knee    . HPI  Is here for evaluation of her right knee pain. Pain is remain unchanged since her last visit. Pain is primarily anterior in her knee but also is noted medially and laterally along the joint line. Denies any catching or locking. He does note that her knee will give out occasionally. She has had corticosteroid injections in the past without relief. We attempted to get Synvisc injections however were unable to get insurance approval.  She has undergone physical therapy without relief. ROS:   Constitutional: Negative for fever and chills. Respiratory: Negative for cough, shortness of breath and wheezing. Cardiovascular: Negative for chest pain and palpitations. GI: Denies any nausea, vomiting, abdominal pain   Musculoskeletal: Positive for right knee pain      Objective :   Ht 5' 5\" (1.651 m)   Wt 256 lb (116.1 kg)   BMI 42.60 kg/m²     General: Boaz Lozada is a 36 y.o. female who is alert and oriented and sitting comfortably in our office. Neuro: alert. oriented  Eyes: Extra-ocular muscles intact  Mouth: Oral mucosa moist. No perioral lesions  Pulm: Respirations unlabored and regular. Skin: warm, well perfused  Psych:   Patient has good fund of knowledge and displays understanging of exam, diagnosis, and plan. MSK: Right knee: Range of motion 0 130 degrees with mildly painful arc of motion. Tenderness to palpation primarily anteriorly over the medial lateral patellar facet. Pain also over the medial plica. Mild medial and lateral joint line tenderness palpation. Magnesium drip stopped per ERP. Pt reported headache and intermittent changes to hearing. LR bolus running. Respirations equal and regular.    Pain with Reynold's however no palpable or audible clicking. Grade 2A Lachman. Walks with a mildly antalgic gait. Extremities warm and well-perfused. Still extremity with sensation grossly intact. Radiology:   No results found. Assessment:      1. Chondromalacia patellae of right knee       Plan: At this point the patient has had continued right knee pain despite nonoperative interventions to include injections, NSAIDs, physical therapy. We will plan for an MRI to evaluate the right knee and see the patient back afterwards to discuss further treatment options. Plan was discussed with the patient she is in agreement. Follow up:Return in about 2 weeks (around 9/23/2021) for MRI review. No orders of the defined types were placed in this encounter. Orders Placed This Encounter   Procedures    MRI KNEE RIGHT WO CONTRAST     Standing Status:   Future     Standing Expiration Date:   9/9/2022     Order Specific Question:   Reason for exam:     Answer:   chronic right knee pain       --------------------------------------------------------------  Michelle Ozuna DO, PGY-5  [facility]) Orthopedic Surgery   3:17 PM 09/09/21      Please excuse any typos/errors, as this note was created with the assistance of voice recognition software. While intending to generate a document that actually reflects the content of the visit, the document can still have some errors including those of syntax and sound-a-like substitutions which may escape proof reading. In such instances, actual meaning can be extrapolated by context.

## 2024-03-05 NOTE — TELEPHONE ENCOUNTER
Denial for Durolane is scanned into this patient's chart Dr. Bolton,     Call transferred to RN as pt c/o severe pain from the hips above.  She received first reclast infusion on 2/21/24 and still having the pain.  Self medicated with 3 extra strength tylenol with no relief.  She also tried advil and aleve providing no relief.  She has intermittent chills, sweating and can barely eat (last episode of these were yesterday.  Today, she went to work but ended up going home because she is in pain.  States couple of days ago she felt better but she used cannabis.  Today, she was in severe pain and took tramadol from a friend but does not know the strength on top of the 3 extra strength tylenol.  She is able to walk/ambulate because she has no pain below the hip region.  She is upset because the infusion center did not notify her that this is the common side effect but it's been two weeks now.      Pt is looking for short term pain relief so she can go to work and function.  Today is the very first time she has taken tramadol and it is giving her relief.

## 2024-12-25 ENCOUNTER — HOSPITAL ENCOUNTER (EMERGENCY)
Age: 44
Discharge: HOME | End: 2024-12-26
Attending: STUDENT IN AN ORGANIZED HEALTH CARE EDUCATION/TRAINING PROGRAM

## 2024-12-25 ENCOUNTER — APPOINTMENT (OUTPATIENT)
Dept: GENERAL RADIOLOGY | Age: 44
End: 2024-12-25

## 2024-12-25 DIAGNOSIS — R06.00 DYSPNEA, UNSPECIFIED TYPE: Primary | ICD-10-CM

## 2024-12-25 DIAGNOSIS — G90.A POTS (POSTURAL ORTHOSTATIC TACHYCARDIA SYNDROME): ICD-10-CM

## 2024-12-25 PROCEDURE — 94761 N-INVAS EAR/PLS OXIMETRY MLT: CPT

## 2024-12-25 PROCEDURE — 71046 X-RAY EXAM CHEST 2 VIEWS: CPT

## 2024-12-25 PROCEDURE — 87636 SARSCOV2 & INF A&B AMP PRB: CPT

## 2024-12-25 PROCEDURE — 6370000000 HC RX 637 (ALT 250 FOR IP): Performed by: STUDENT IN AN ORGANIZED HEALTH CARE EDUCATION/TRAINING PROGRAM

## 2024-12-25 PROCEDURE — 99284 EMERGENCY DEPT VISIT MOD MDM: CPT

## 2024-12-25 PROCEDURE — 94640 AIRWAY INHALATION TREATMENT: CPT

## 2024-12-25 RX ORDER — ALBUTEROL SULFATE 5 MG/ML
15 SOLUTION RESPIRATORY (INHALATION)
Status: DISCONTINUED | OUTPATIENT
Start: 2024-12-25 | End: 2024-12-26 | Stop reason: HOSPADM

## 2024-12-25 RX ORDER — ALBUTEROL SULFATE 0.83 MG/ML
2.5 SOLUTION RESPIRATORY (INHALATION)
Status: COMPLETED | OUTPATIENT
Start: 2024-12-25 | End: 2024-12-26

## 2024-12-25 RX ORDER — IPRATROPIUM BROMIDE AND ALBUTEROL SULFATE 2.5; .5 MG/3ML; MG/3ML
1 SOLUTION RESPIRATORY (INHALATION)
Status: COMPLETED | OUTPATIENT
Start: 2024-12-25 | End: 2024-12-26

## 2024-12-25 RX ADMIN — IPRATROPIUM BROMIDE AND ALBUTEROL SULFATE 1 DOSE: 2.5; .5 SOLUTION RESPIRATORY (INHALATION) at 23:40

## 2024-12-25 RX ADMIN — IPRATROPIUM BROMIDE AND ALBUTEROL SULFATE 1 DOSE: 2.5; .5 SOLUTION RESPIRATORY (INHALATION) at 23:50

## 2024-12-26 VITALS
HEART RATE: 85 BPM | WEIGHT: 260 LBS | BODY MASS INDEX: 43.27 KG/M2 | DIASTOLIC BLOOD PRESSURE: 84 MMHG | RESPIRATION RATE: 24 BRPM | OXYGEN SATURATION: 100 % | TEMPERATURE: 97.6 F | SYSTOLIC BLOOD PRESSURE: 110 MMHG

## 2024-12-26 PROCEDURE — 94640 AIRWAY INHALATION TREATMENT: CPT

## 2024-12-26 PROCEDURE — 6360000002 HC RX W HCPCS: Performed by: STUDENT IN AN ORGANIZED HEALTH CARE EDUCATION/TRAINING PROGRAM

## 2024-12-26 PROCEDURE — 6370000000 HC RX 637 (ALT 250 FOR IP): Performed by: STUDENT IN AN ORGANIZED HEALTH CARE EDUCATION/TRAINING PROGRAM

## 2024-12-26 RX ORDER — AZITHROMYCIN 250 MG/1
500 TABLET, FILM COATED ORAL ONCE
Status: COMPLETED | OUTPATIENT
Start: 2024-12-26 | End: 2024-12-26

## 2024-12-26 RX ORDER — AZITHROMYCIN 250 MG/1
250 TABLET, FILM COATED ORAL DAILY
Qty: 4 TABLET | Refills: 0 | Status: SHIPPED | OUTPATIENT
Start: 2024-12-26 | End: 2024-12-30

## 2024-12-26 RX ORDER — ALBUTEROL SULFATE 90 UG/1
2 INHALANT RESPIRATORY (INHALATION) 4 TIMES DAILY PRN
Qty: 18 G | Refills: 0 | Status: SHIPPED | OUTPATIENT
Start: 2024-12-26

## 2024-12-26 RX ORDER — DEXAMETHASONE SODIUM PHOSPHATE 10 MG/ML
10 INJECTION, SOLUTION INTRAMUSCULAR; INTRAVENOUS ONCE
Status: COMPLETED | OUTPATIENT
Start: 2024-12-26 | End: 2024-12-26

## 2024-12-26 RX ORDER — ALBUTEROL SULFATE 5 MG/ML
2.5 SOLUTION RESPIRATORY (INHALATION) EVERY 6 HOURS PRN
Qty: 1 EACH | Refills: 0 | Status: SHIPPED | OUTPATIENT
Start: 2024-12-26

## 2024-12-26 RX ORDER — FLUTICASONE PROPIONATE 110 UG/1
1 AEROSOL, METERED RESPIRATORY (INHALATION) 2 TIMES DAILY
Qty: 12 G | Refills: 0 | Status: SHIPPED | OUTPATIENT
Start: 2024-12-26

## 2024-12-26 RX ORDER — IBUPROFEN 600 MG/1
600 TABLET, FILM COATED ORAL ONCE
Status: COMPLETED | OUTPATIENT
Start: 2024-12-26 | End: 2024-12-26

## 2024-12-26 RX ORDER — ACETAMINOPHEN 500 MG
1000 TABLET ORAL ONCE
Status: DISCONTINUED | OUTPATIENT
Start: 2024-12-26 | End: 2024-12-26

## 2024-12-26 RX ADMIN — DEXAMETHASONE SODIUM PHOSPHATE 10 MG: 10 INJECTION INTRAMUSCULAR; INTRAVENOUS at 01:58

## 2024-12-26 RX ADMIN — AZITHROMYCIN DIHYDRATE 500 MG: 250 TABLET ORAL at 01:56

## 2024-12-26 RX ADMIN — IPRATROPIUM BROMIDE AND ALBUTEROL SULFATE 1 DOSE: 2.5; .5 SOLUTION RESPIRATORY (INHALATION) at 03:11

## 2024-12-26 RX ADMIN — IBUPROFEN 600 MG: 600 TABLET, FILM COATED ORAL at 01:56

## 2024-12-26 ASSESSMENT — PAIN DESCRIPTION - LOCATION: LOCATION: HEAD

## 2024-12-26 ASSESSMENT — ENCOUNTER SYMPTOMS
NAUSEA: 0
CHEST TIGHTNESS: 1
COUGH: 1
RHINORRHEA: 0
ABDOMINAL PAIN: 0
SHORTNESS OF BREATH: 1
VOMITING: 0

## 2024-12-26 ASSESSMENT — PAIN SCALES - GENERAL: PAINLEVEL_OUTOF10: 10

## 2024-12-26 ASSESSMENT — PAIN DESCRIPTION - DESCRIPTORS: DESCRIPTORS: ACHING;DISCOMFORT

## 2024-12-26 NOTE — ED PROVIDER NOTES
[facility]  Emergency Department Encounter  Emergency Medicine Physician     Pt Name: Stephanie Silva  MRN: [id number]  Birthdate 1980  Date of evaluation: 12/26/24  PCP:  No primary care provider on file.    CHIEF COMPLAINT       Chief Complaint   Patient presents with    Nasal Congestion    Shortness of Breath       HISTORY OF PRESENT ILLNESS  (Location/Symptom, Timing/Onset, Context/Setting, Quality, Duration, Modifying Factors, Severity.)    Stephanie Silva is a 44 y.o. female who presents with congestion and shortness of breath. Ongoing for 2 days. Endorses medical hx as below and POTS. States she often gets pneumonia a few times per year and states this feels like the beginnings of pneumonia. States she has no hx of asthma or COPD. Does not smoke or vape. Denies chest pain. Denies fevers but is having chills. Occasional rigors. Denies n/v/d. Endorses body aches. Endorses productive cough of green sputum.         PAST MEDICAL / SURGICAL / SOCIAL / FAMILY HISTORY    has a past medical history of Acute medial meniscus tear of right knee, Allergies, Arthritis, Kidney stone, Kidney stones, Liver mass, Migraine, POTS (postural orthostatic tachycardia syndrome), Seizures (HCC), and Wellness examination.     has a past surgical history that includes Cholecystectomy; Appendectomy; Endometrial ablation; Cystocopy (06/068/2016); Hysterectomy; Knee arthroscopy (Right, 10/19/2021); Knee arthroscopy (Right, 10/19/2021); and liver biopsy.    Social History     Socioeconomic History    Marital status:      Spouse name: Not on file    Number of children: Not on file    Years of education: Not on file    Highest education level: Not on file   Occupational History    Not on file   Tobacco Use    Smoking status: Never    Smokeless tobacco: Never   Vaping Use    Vaping status: Never Used   Substance and Sexual Activity    Alcohol use: No    Drug use: No    Sexual activity: Not on file   Other Topics

## 2024-12-26 NOTE — DISCHARGE INSTRUCTIONS
Please establish care with a family doctor  Please use either the albuterol inhaler or the albuterol nebulizer solution as prescribed along with the Atrovent inhaler  Please start using the Flovent inhaler which is the inhaled corticosteroid  Please take the azithromycin as prescribed  If you have any worsening of symptoms, return to the ER

## 2025-01-17 ENCOUNTER — HOSPITAL ENCOUNTER (EMERGENCY)
Age: 45
Discharge: HOME | End: 2025-01-17
Attending: EMERGENCY MEDICINE

## 2025-01-17 VITALS
RESPIRATION RATE: 20 BRPM | HEART RATE: 96 BPM | DIASTOLIC BLOOD PRESSURE: 74 MMHG | BODY MASS INDEX: 44.98 KG/M2 | OXYGEN SATURATION: 95 % | WEIGHT: 270 LBS | TEMPERATURE: 97.3 F | SYSTOLIC BLOOD PRESSURE: 134 MMHG | HEIGHT: 65 IN

## 2025-01-17 DIAGNOSIS — R11.2 NAUSEA VOMITING AND DIARRHEA: Primary | ICD-10-CM

## 2025-01-17 DIAGNOSIS — R19.7 NAUSEA VOMITING AND DIARRHEA: Primary | ICD-10-CM

## 2025-01-17 LAB
ABSOLUTE BANDS: 0.68 K/UL (ref 0–1)
ALBUMIN SERPL-MCNC: 3.8 G/DL (ref 3.5–5.2)
ALP SERPL-CCNC: 94 U/L (ref 35–104)
ALT SERPL-CCNC: 18 U/L (ref 10–35)
ANION GAP SERPL CALCULATED.3IONS-SCNC: 13 MMOL/L (ref 9–16)
AST SERPL-CCNC: 26 U/L (ref 10–35)
BANDS: 9 % (ref 0–10)
BASOPHILS # BLD: 0 K/UL (ref 0–0.2)
BASOPHILS NFR BLD: 0 % (ref 0–2)
BILIRUB SERPL-MCNC: 0.3 MG/DL (ref 0–1.2)
BUN SERPL-MCNC: 13 MG/DL (ref 6–20)
CALCIUM SERPL-MCNC: 8.3 MG/DL (ref 8.6–10.4)
CHLORIDE SERPL-SCNC: 107 MMOL/L (ref 98–107)
CO2 SERPL-SCNC: 21 MMOL/L (ref 20–31)
CREAT SERPL-MCNC: 0.8 MG/DL (ref 0.7–1.2)
EOSINOPHIL # BLD: 0 K/UL (ref 0–0.4)
EOSINOPHILS RELATIVE PERCENT: 0 % (ref 0–4)
ERYTHROCYTE [DISTWIDTH] IN BLOOD BY AUTOMATED COUNT: 14.2 % (ref 11.5–14.9)
GFR, ESTIMATED: >90 ML/MIN/1.73M2
GLUCOSE SERPL-MCNC: 110 MG/DL (ref 74–99)
HCG SERPL QL: NEGATIVE
HCT VFR BLD AUTO: 44.7 % (ref 36–46)
HGB BLD-MCNC: 14.9 G/DL (ref 12–16)
LYMPHOCYTES NFR BLD: 0.45 K/UL (ref 1–4.8)
LYMPHOCYTES RELATIVE PERCENT: 6 % (ref 24–44)
MCH RBC QN AUTO: 28.7 PG (ref 26–34)
MCHC RBC AUTO-ENTMCNC: 33.2 G/DL (ref 31–37)
MCV RBC AUTO: 86.3 FL (ref 80–100)
MONOCYTES NFR BLD: 0.23 K/UL (ref 0.1–1.3)
MONOCYTES NFR BLD: 3 % (ref 1–7)
MORPHOLOGY: NORMAL
NEUTROPHILS NFR BLD: 82 % (ref 36–66)
NEUTS SEG NFR BLD: 6.14 K/UL (ref 1.3–9.1)
PLATELET # BLD AUTO: 246 K/UL (ref 150–450)
PMV BLD AUTO: 7.5 FL (ref 6–12)
POTASSIUM SERPL-SCNC: 4 MMOL/L (ref 3.7–5.3)
PROT SERPL-MCNC: 6.9 G/DL (ref 6.6–8.7)
RBC # BLD AUTO: 5.18 M/UL (ref 4–5.2)
SODIUM SERPL-SCNC: 141 MMOL/L (ref 136–145)
WBC OTHER # BLD: 7.5 K/UL (ref 3.5–11)

## 2025-01-17 PROCEDURE — 85025 COMPLETE CBC W/AUTO DIFF WBC: CPT

## 2025-01-17 PROCEDURE — 2580000003 HC RX 258: Performed by: EMERGENCY MEDICINE

## 2025-01-17 PROCEDURE — 99284 EMERGENCY DEPT VISIT MOD MDM: CPT

## 2025-01-17 PROCEDURE — 80053 COMPREHEN METABOLIC PANEL: CPT

## 2025-01-17 PROCEDURE — 84703 CHORIONIC GONADOTROPIN ASSAY: CPT

## 2025-01-17 PROCEDURE — 6360000002 HC RX W HCPCS: Performed by: EMERGENCY MEDICINE

## 2025-01-17 PROCEDURE — 96374 THER/PROPH/DIAG INJ IV PUSH: CPT

## 2025-01-17 PROCEDURE — 36415 COLL VENOUS BLD VENIPUNCTURE: CPT

## 2025-01-17 RX ORDER — 0.9 % SODIUM CHLORIDE 0.9 %
1000 INTRAVENOUS SOLUTION INTRAVENOUS ONCE
Status: COMPLETED | OUTPATIENT
Start: 2025-01-17 | End: 2025-01-17

## 2025-01-17 RX ORDER — ONDANSETRON 2 MG/ML
8 INJECTION INTRAMUSCULAR; INTRAVENOUS ONCE
Status: COMPLETED | OUTPATIENT
Start: 2025-01-17 | End: 2025-01-17

## 2025-01-17 RX ORDER — ONDANSETRON 4 MG/1
4 TABLET, FILM COATED ORAL EVERY 8 HOURS PRN
Qty: 20 TABLET | Refills: 0 | Status: SHIPPED | OUTPATIENT
Start: 2025-01-17

## 2025-01-17 RX ADMIN — ONDANSETRON 8 MG: 2 INJECTION, SOLUTION INTRAMUSCULAR; INTRAVENOUS at 20:05

## 2025-01-17 RX ADMIN — SODIUM CHLORIDE 1000 ML: 9 INJECTION, SOLUTION INTRAVENOUS at 20:20

## 2025-01-17 ASSESSMENT — ENCOUNTER SYMPTOMS
VOMITING: 1
SORE THROAT: 0
ABDOMINAL PAIN: 0
SHORTNESS OF BREATH: 0
NAUSEA: 1
EYE PAIN: 0
CHEST TIGHTNESS: 0
DIARRHEA: 1
EYE REDNESS: 0
CONSTIPATION: 0
BACK PAIN: 0
COUGH: 0

## 2025-01-17 ASSESSMENT — LIFESTYLE VARIABLES
HOW MANY STANDARD DRINKS CONTAINING ALCOHOL DO YOU HAVE ON A TYPICAL DAY: PATIENT DOES NOT DRINK
HOW OFTEN DO YOU HAVE A DRINK CONTAINING ALCOHOL: NEVER

## 2025-01-18 NOTE — ED PROVIDER NOTES
[facility] EMERGENCY DEPARTMENT  eMERGENCY dEPARTMENT eNCOUnter    Pt Name: Stephanie Silva  MRN: [id number]  Birthdate 1980  Date of evaluation: 1/17/25  CHIEF COMPLAINT       Chief Complaint   Patient presents with    Vomiting    Diarrhea     HISTORY OF PRESENT ILLNESS   HPI  Patient with PMH of POTS, renal stones, seizure disorder presenting with vomiting and diarrhea.   Acute onset of vomiting and diarrhea this morning at 3 am, has had multiple episodes of both through out the day. States her anus is very irritated and sore from the diarrhea and she has noted small streaks of red on TP after wiping. Has had hemorrhoids in the past.   No fevers, no chest pain or sob.   Diffuse abdominal cramping.   Not tolerating po intake.   +sick contacts at work.     REVIEW OF SYSTEMS     Review of Systems   Constitutional:  Negative for chills and fever.   HENT:  Negative for congestion, ear pain and sore throat.    Eyes:  Negative for pain, redness and visual disturbance.   Respiratory:  Negative for cough, chest tightness and shortness of breath.    Cardiovascular:  Negative for chest pain and palpitations.   Gastrointestinal:  Positive for diarrhea, nausea and vomiting. Negative for abdominal pain and constipation.   Genitourinary:  Negative for dysuria and vaginal discharge.   Musculoskeletal:  Negative for back pain and neck pain.   Skin:  Negative for rash and wound.   Neurological:  Negative for seizures, syncope and headaches.     PASTMEDICAL HISTORY     Past Medical History:   Diagnosis Date    Acute medial meniscus tear of right knee     Allergies     dust, cockroaches, cigarettes, spider bites   Dr. Burt Sands Allergist    Arthritis     right knee    Kidney stone 6/8/2016    Kidney stones     Liver mass     Migraine     POTS (postural orthostatic tachycardia syndrome)     Dr. Maurice [facility]  last visit 6/2021    Seizures (HCC)     started 3/04/2021 Dr. Ceballos Neurology [facility]

## (undated) DEVICE — STERLING XTRASHARP SHAVER GREAT WHITE SHAVER BLADE, 4.2 MM: Brand: STERLING XTRASHARP SHAVER GREAT WHITE

## (undated) DEVICE — GOWN,AURORA,NONREINFORCED,LARGE: Brand: MEDLINE

## (undated) DEVICE — GLOVE ORANGE PI 8   MSG9080

## (undated) DEVICE — YANKAUER,FLEXIBLE HANDLE,REGLR CAPACITY: Brand: MEDLINE INDUSTRIES, INC.

## (undated) DEVICE — GOWN,SIRUS,NONRNF,SETINSLV,XL,20/CS: Brand: MEDLINE

## (undated) DEVICE — GLOVE ORANGE PI 7 1/2   MSG9075

## (undated) DEVICE — APPLICATOR MEDICATED 26 CC SOLUTION HI LT ORNG CHLORAPREP

## (undated) DEVICE — SOLUTION IRRIG 3000ML 0.9% SOD CHL USP UROMATIC PLAS CONT

## (undated) DEVICE — MAT FLOOR ULTRA ABS 28X48IN

## (undated) DEVICE — COVER LT HNDL BLU PLAS

## (undated) DEVICE — Device

## (undated) DEVICE — ENDOSCOPIC ELECTROSURGICAL(5)

## (undated) DEVICE — GLOVE SURG SZ 65 THK91MIL LTX FREE SYN POLYISOPRENE

## (undated) DEVICE — SUTURE NONABSORBABLE MONOFILAMENT 3-0 PS-1 18 IN BLK ETHILON 1663H